# Patient Record
Sex: MALE | Race: WHITE | Employment: OTHER | URBAN - METROPOLITAN AREA
[De-identification: names, ages, dates, MRNs, and addresses within clinical notes are randomized per-mention and may not be internally consistent; named-entity substitution may affect disease eponyms.]

---

## 2018-04-07 ENCOUNTER — HOSPITAL ENCOUNTER (OUTPATIENT)
Age: 83
Discharge: HOME | End: 2018-04-07
Payer: MEDICARE

## 2018-04-07 LAB
INR BLD: 2.3
PROTHROMBIN TIME: 25.8 SEC (ref 9.3–12.4)

## 2018-04-07 PROCEDURE — 36415 COLL VENOUS BLD VENIPUNCTURE: CPT

## 2018-04-07 PROCEDURE — 85610 PROTHROMBIN TIME: CPT

## 2018-05-09 ENCOUNTER — HOSPITAL ENCOUNTER (OUTPATIENT)
Age: 83
Discharge: HOME | End: 2018-05-09
Payer: MEDICARE

## 2018-05-09 LAB
INR BLD: 2.9
PROTHROMBIN TIME: 33 SEC (ref 9.3–12.4)

## 2018-05-09 PROCEDURE — 85610 PROTHROMBIN TIME: CPT

## 2018-05-09 PROCEDURE — 36415 COLL VENOUS BLD VENIPUNCTURE: CPT

## 2018-06-04 ENCOUNTER — HOSPITAL ENCOUNTER (OUTPATIENT)
Age: 83
Discharge: HOME | End: 2018-06-04
Payer: MEDICARE

## 2018-06-04 LAB
INR BLD: 2.1
PROTHROMBIN TIME: 24 SEC (ref 9.3–12.4)

## 2018-06-04 PROCEDURE — 36415 COLL VENOUS BLD VENIPUNCTURE: CPT

## 2018-06-04 PROCEDURE — 85610 PROTHROMBIN TIME: CPT

## 2018-07-10 ENCOUNTER — HOSPITAL ENCOUNTER (OUTPATIENT)
Age: 83
Discharge: HOME | End: 2018-07-10
Payer: MEDICARE

## 2018-07-10 LAB
INR BLD: 1.5
PROTHROMBIN TIME: 16.6 SEC (ref 9.3–12.4)

## 2018-07-10 PROCEDURE — 36415 COLL VENOUS BLD VENIPUNCTURE: CPT

## 2018-07-10 PROCEDURE — 85610 PROTHROMBIN TIME: CPT

## 2018-08-02 ENCOUNTER — HOSPITAL ENCOUNTER (OUTPATIENT)
Age: 83
Discharge: HOME | End: 2018-08-02
Payer: MEDICARE

## 2018-08-02 LAB
ALBUMIN SERPL-MCNC: 3.6 G/DL (ref 3.5–5.2)
ALP BLD-CCNC: 77 U/L (ref 40–129)
ALT SERPL-CCNC: 8 U/L (ref 0–40)
ANION GAP SERPL CALCULATED.3IONS-SCNC: 11 MMOL/L (ref 7–16)
AST SERPL-CCNC: 14 U/L (ref 0–39)
BACTERIA: NORMAL /HPF
BASOPHILS ABSOLUTE: 0.02 E9/L (ref 0–0.2)
BASOPHILS RELATIVE PERCENT: 0.5 % (ref 0–2)
BILIRUB SERPL-MCNC: 0.3 MG/DL (ref 0–1.2)
BILIRUBIN URINE: NEGATIVE
BLOOD, URINE: NEGATIVE
BUN BLDV-MCNC: 46 MG/DL (ref 8–23)
CALCIUM SERPL-MCNC: 8.2 MG/DL (ref 8.6–10.2)
CHLORIDE BLD-SCNC: 108 MMOL/L (ref 98–107)
CLARITY: CLEAR
CO2: 19 MMOL/L (ref 22–29)
COLOR: YELLOW
CREAT SERPL-MCNC: 2.3 MG/DL (ref 0.7–1.2)
CREATININE URINE: 94 MG/DL (ref 40–278)
EOSINOPHILS ABSOLUTE: 0.16 E9/L (ref 0.05–0.5)
EOSINOPHILS RELATIVE PERCENT: 3.8 % (ref 0–6)
FERRITIN: 143 NG/ML
GFR AFRICAN AMERICAN: 32
GFR NON-AFRICAN AMERICAN: 27 ML/MIN/1.73
GLUCOSE BLD-MCNC: 128 MG/DL (ref 74–109)
GLUCOSE URINE: NEGATIVE MG/DL
HCT VFR BLD CALC: 32.3 % (ref 37–54)
HEMOGLOBIN: 10.2 G/DL (ref 12.5–16.5)
IMMATURE GRANULOCYTES #: 0.01 E9/L
IMMATURE GRANULOCYTES %: 0.2 % (ref 0–5)
INR BLD: 1.7
IRON SATURATION: 33 % (ref 20–55)
IRON: 66 MCG/DL (ref 59–158)
KETONES, URINE: NEGATIVE MG/DL
LEUKOCYTE ESTERASE, URINE: NEGATIVE
LYMPHOCYTES ABSOLUTE: 1.05 E9/L (ref 1.5–4)
LYMPHOCYTES RELATIVE PERCENT: 24.7 % (ref 20–42)
MAGNESIUM: 2 MG/DL (ref 1.6–2.6)
MCH RBC QN AUTO: 25.3 PG (ref 26–35)
MCHC RBC AUTO-ENTMCNC: 31.6 % (ref 32–34.5)
MCV RBC AUTO: 80.1 FL (ref 80–99.9)
MONOCYTES ABSOLUTE: 0.58 E9/L (ref 0.1–0.95)
MONOCYTES RELATIVE PERCENT: 13.6 % (ref 2–12)
NEUTROPHILS ABSOLUTE: 2.43 E9/L (ref 1.8–7.3)
NEUTROPHILS RELATIVE PERCENT: 57.2 % (ref 43–80)
NITRITE, URINE: NEGATIVE
PARATHYROID HORMONE INTACT: 83 PG/ML (ref 15–65)
PDW BLD-RTO: 14.8 FL (ref 11.5–15)
PH UA: 5 (ref 5–9)
PHOSPHORUS: 2.9 MG/DL (ref 2.5–4.5)
PLATELET # BLD: 144 E9/L (ref 130–450)
PMV BLD AUTO: 9.8 FL (ref 7–12)
POTASSIUM SERPL-SCNC: 4.9 MMOL/L (ref 3.5–5)
PROTEIN PROTEIN: 67 MG/DL (ref 0–12)
PROTEIN UA: 30 MG/DL
PROTEIN/CREAT RATIO: 0.7
PROTEIN/CREAT RATIO: 0.7 (ref 0–0.2)
PROTHROMBIN TIME: 19.7 SEC (ref 9.3–12.4)
RBC # BLD: 4.03 E12/L (ref 3.8–5.8)
RBC UA: NORMAL /HPF (ref 0–2)
SODIUM BLD-SCNC: 138 MMOL/L (ref 132–146)
SPECIFIC GRAVITY UA: 1.02 (ref 1–1.03)
TOTAL IRON BINDING CAPACITY: 202 MCG/DL (ref 250–450)
TOTAL PROTEIN: 6.8 G/DL (ref 6.4–8.3)
URIC ACID, SERUM: 8.6 MG/DL (ref 3.4–7)
UROBILINOGEN, URINE: 0.2 E.U./DL
VITAMIN D 25-HYDROXY: 44 NG/ML (ref 30–100)
WBC # BLD: 4.3 E9/L (ref 4.5–11.5)
WBC UA: NORMAL /HPF (ref 0–5)

## 2018-08-02 PROCEDURE — 82728 ASSAY OF FERRITIN: CPT

## 2018-08-02 PROCEDURE — 80053 COMPREHEN METABOLIC PANEL: CPT

## 2018-08-02 PROCEDURE — 84100 ASSAY OF PHOSPHORUS: CPT

## 2018-08-02 PROCEDURE — 82306 VITAMIN D 25 HYDROXY: CPT

## 2018-08-02 PROCEDURE — 85610 PROTHROMBIN TIME: CPT

## 2018-08-02 PROCEDURE — 81001 URINALYSIS AUTO W/SCOPE: CPT

## 2018-08-02 PROCEDURE — 85025 COMPLETE CBC W/AUTO DIFF WBC: CPT

## 2018-08-02 PROCEDURE — 83540 ASSAY OF IRON: CPT

## 2018-08-02 PROCEDURE — 83735 ASSAY OF MAGNESIUM: CPT

## 2018-08-02 PROCEDURE — 36415 COLL VENOUS BLD VENIPUNCTURE: CPT

## 2018-08-02 PROCEDURE — 83550 IRON BINDING TEST: CPT

## 2018-08-02 PROCEDURE — 84550 ASSAY OF BLOOD/URIC ACID: CPT

## 2018-08-02 PROCEDURE — 82570 ASSAY OF URINE CREATININE: CPT

## 2018-08-02 PROCEDURE — 84156 ASSAY OF PROTEIN URINE: CPT

## 2018-08-02 PROCEDURE — 83970 ASSAY OF PARATHORMONE: CPT

## 2018-08-14 ENCOUNTER — HOSPITAL ENCOUNTER (OUTPATIENT)
Age: 83
Discharge: HOME | End: 2018-08-16
Payer: MEDICARE

## 2018-08-14 PROCEDURE — 80061 LIPID PANEL: CPT

## 2018-08-14 PROCEDURE — 80053 COMPREHEN METABOLIC PANEL: CPT

## 2018-08-14 PROCEDURE — 84443 ASSAY THYROID STIM HORMONE: CPT

## 2018-08-14 PROCEDURE — 85025 COMPLETE CBC W/AUTO DIFF WBC: CPT

## 2018-08-14 PROCEDURE — 84480 ASSAY TRIIODOTHYRONINE (T3): CPT

## 2018-08-14 PROCEDURE — 84479 ASSAY OF THYROID (T3 OR T4): CPT

## 2018-08-14 PROCEDURE — 82306 VITAMIN D 25 HYDROXY: CPT

## 2018-08-14 PROCEDURE — 84436 ASSAY OF TOTAL THYROXINE: CPT

## 2018-08-15 LAB
ALBUMIN SERPL-MCNC: 3.9 G/DL (ref 3.5–5.2)
ALP BLD-CCNC: 87 U/L (ref 40–129)
ALT SERPL-CCNC: 21 U/L (ref 0–40)
ANION GAP SERPL CALCULATED.3IONS-SCNC: 19 MMOL/L (ref 7–16)
AST SERPL-CCNC: 21 U/L (ref 0–39)
BASOPHILS ABSOLUTE: 0.02 E9/L (ref 0–0.2)
BASOPHILS RELATIVE PERCENT: 0.5 % (ref 0–2)
BILIRUB SERPL-MCNC: 0.4 MG/DL (ref 0–1.2)
BUN BLDV-MCNC: 53 MG/DL (ref 8–23)
CALCIUM SERPL-MCNC: 8.4 MG/DL (ref 8.6–10.2)
CHLORIDE BLD-SCNC: 107 MMOL/L (ref 98–107)
CHOLESTEROL, TOTAL: 176 MG/DL (ref 0–199)
CO2: 17 MMOL/L (ref 22–29)
CREAT SERPL-MCNC: 2.5 MG/DL (ref 0.7–1.2)
EOSINOPHILS ABSOLUTE: 0.1 E9/L (ref 0.05–0.5)
EOSINOPHILS RELATIVE PERCENT: 2.4 % (ref 0–6)
GFR AFRICAN AMERICAN: 29
GFR NON-AFRICAN AMERICAN: 24 ML/MIN/1.73
GLUCOSE BLD-MCNC: 84 MG/DL (ref 74–109)
HCT VFR BLD CALC: 32.7 % (ref 37–54)
HDLC SERPL-MCNC: 61 MG/DL
HEMOGLOBIN: 9.8 G/DL (ref 12.5–16.5)
IMMATURE GRANULOCYTES #: 0.02 E9/L
IMMATURE GRANULOCYTES %: 0.5 % (ref 0–5)
LDL CHOLESTEROL CALCULATED: 96 MG/DL (ref 0–99)
LYMPHOCYTES ABSOLUTE: 1.07 E9/L (ref 1.5–4)
LYMPHOCYTES RELATIVE PERCENT: 25.2 % (ref 20–42)
MCH RBC QN AUTO: 24.9 PG (ref 26–35)
MCHC RBC AUTO-ENTMCNC: 30 % (ref 32–34.5)
MCV RBC AUTO: 83 FL (ref 80–99.9)
MONOCYTES ABSOLUTE: 0.57 E9/L (ref 0.1–0.95)
MONOCYTES RELATIVE PERCENT: 13.4 % (ref 2–12)
NEUTROPHILS ABSOLUTE: 2.46 E9/L (ref 1.8–7.3)
NEUTROPHILS RELATIVE PERCENT: 58 % (ref 43–80)
PDW BLD-RTO: 15.4 FL (ref 11.5–15)
PLATELET # BLD: 163 E9/L (ref 130–450)
PMV BLD AUTO: 10.9 FL (ref 7–12)
POTASSIUM SERPL-SCNC: 6.2 MMOL/L (ref 3.5–5)
RBC # BLD: 3.94 E12/L (ref 3.8–5.8)
SODIUM BLD-SCNC: 143 MMOL/L (ref 132–146)
T3 TOTAL: 73.83 NG/DL (ref 80–200)
T3 UPTAKE PERCENT: 34.2 % (ref 22.5–37)
T4 TOTAL: 6.5 MCG/DL (ref 4.5–11.7)
TOTAL PROTEIN: 7.1 G/DL (ref 6.4–8.3)
TRIGL SERPL-MCNC: 96 MG/DL (ref 0–149)
TSH SERPL DL<=0.05 MIU/L-ACNC: 3.93 UIU/ML (ref 0.27–4.2)
VITAMIN D 25-HYDROXY: 51 NG/ML (ref 30–100)
VLDLC SERPL CALC-MCNC: 19 MG/DL
WBC # BLD: 4.2 E9/L (ref 4.5–11.5)

## 2018-08-17 ENCOUNTER — HOSPITAL ENCOUNTER (OUTPATIENT)
Age: 83
Discharge: HOME | End: 2018-08-17
Payer: MEDICARE

## 2018-08-17 LAB
ANION GAP SERPL CALCULATED.3IONS-SCNC: 12 MMOL/L (ref 7–16)
BUN BLDV-MCNC: 47 MG/DL (ref 8–23)
CALCIUM SERPL-MCNC: 8.4 MG/DL (ref 8.6–10.2)
CHLORIDE BLD-SCNC: 107 MMOL/L (ref 98–107)
CO2: 21 MMOL/L (ref 22–29)
CREAT SERPL-MCNC: 2.4 MG/DL (ref 0.7–1.2)
GFR AFRICAN AMERICAN: 31
GFR NON-AFRICAN AMERICAN: 25 ML/MIN/1.73
GLUCOSE BLD-MCNC: 98 MG/DL (ref 74–109)
POTASSIUM SERPL-SCNC: 5.4 MMOL/L (ref 3.5–5)
SODIUM BLD-SCNC: 140 MMOL/L (ref 132–146)

## 2018-08-17 PROCEDURE — 36415 COLL VENOUS BLD VENIPUNCTURE: CPT

## 2018-08-17 PROCEDURE — 80048 BASIC METABOLIC PNL TOTAL CA: CPT

## 2018-09-15 ENCOUNTER — HOSPITAL ENCOUNTER (OUTPATIENT)
Age: 83
Discharge: HOME | End: 2018-09-15
Payer: MEDICARE

## 2018-09-15 LAB
INR BLD: 2.1
PROTHROMBIN TIME: 23.9 SEC (ref 9.3–12.4)

## 2018-09-15 PROCEDURE — 36415 COLL VENOUS BLD VENIPUNCTURE: CPT

## 2018-09-15 PROCEDURE — 85610 PROTHROMBIN TIME: CPT

## 2018-10-18 ENCOUNTER — HOSPITAL ENCOUNTER (OUTPATIENT)
Age: 83
Discharge: HOME | End: 2018-10-18
Payer: MEDICARE

## 2018-10-18 LAB
INR BLD: 2.7
PROTHROMBIN TIME: 30.9 SEC (ref 9.3–12.4)

## 2018-10-18 PROCEDURE — 85610 PROTHROMBIN TIME: CPT

## 2018-10-18 PROCEDURE — 36415 COLL VENOUS BLD VENIPUNCTURE: CPT

## 2018-11-14 ENCOUNTER — HOSPITAL ENCOUNTER (OUTPATIENT)
Age: 83
Discharge: HOME | End: 2018-11-14
Payer: MEDICARE

## 2018-11-14 LAB
INR BLD: 2.1
PROTHROMBIN TIME: 23.5 SEC (ref 9.3–12.4)

## 2018-11-14 PROCEDURE — 85610 PROTHROMBIN TIME: CPT

## 2018-11-14 PROCEDURE — 36415 COLL VENOUS BLD VENIPUNCTURE: CPT

## 2018-12-12 ENCOUNTER — HOSPITAL ENCOUNTER (OUTPATIENT)
Age: 83
Discharge: HOME | End: 2018-12-12
Payer: MEDICARE

## 2018-12-12 LAB
INR BLD: 2
PROTHROMBIN TIME: 22.9 SEC (ref 9.3–12.4)

## 2018-12-12 PROCEDURE — 85610 PROTHROMBIN TIME: CPT

## 2018-12-12 PROCEDURE — 36415 COLL VENOUS BLD VENIPUNCTURE: CPT

## 2019-01-01 ENCOUNTER — HOSPITAL ENCOUNTER (OUTPATIENT)
Age: 84
Discharge: HOME | End: 2019-11-16
Payer: MEDICARE

## 2019-01-01 ENCOUNTER — HOSPITAL ENCOUNTER (OUTPATIENT)
Age: 84
Discharge: HOME | End: 2019-07-27
Payer: MEDICARE

## 2019-01-01 ENCOUNTER — HOSPITAL ENCOUNTER (INPATIENT)
Age: 84
LOS: 2 days | Discharge: HOME | DRG: 149 | End: 2019-08-05
Attending: EMERGENCY MEDICINE | Admitting: INTERNAL MEDICINE
Payer: MEDICARE

## 2019-01-01 ENCOUNTER — HOSPITAL ENCOUNTER (OUTPATIENT)
Age: 84
Discharge: HOME | End: 2019-06-25
Payer: MEDICARE

## 2019-01-01 ENCOUNTER — NURSE ONLY (OUTPATIENT)
Dept: NON INVASIVE DIAGNOSTICS | Age: 84
End: 2019-01-01

## 2019-01-01 ENCOUNTER — HOSPITAL ENCOUNTER (OUTPATIENT)
Age: 84
Discharge: HOME | End: 2019-09-24
Payer: MEDICARE

## 2019-01-01 ENCOUNTER — HOSPITAL ENCOUNTER (OUTPATIENT)
Age: 84
Discharge: HOME | End: 2019-12-14
Payer: MEDICARE

## 2019-01-01 ENCOUNTER — HOSPITAL ENCOUNTER (OUTPATIENT)
Age: 84
Discharge: HOME | End: 2019-08-17
Payer: MEDICARE

## 2019-01-01 ENCOUNTER — HOSPITAL ENCOUNTER (OUTPATIENT)
Age: 84
Discharge: HOME | End: 2019-05-11
Payer: MEDICARE

## 2019-01-01 ENCOUNTER — HOSPITAL ENCOUNTER (OUTPATIENT)
Age: 84
Discharge: HOME | End: 2019-06-19
Payer: MEDICARE

## 2019-01-01 ENCOUNTER — HOSPITAL ENCOUNTER (OUTPATIENT)
Age: 84
Discharge: HOME | End: 2019-10-10
Payer: MEDICARE

## 2019-01-01 ENCOUNTER — TELEPHONE (OUTPATIENT)
Dept: NON INVASIVE DIAGNOSTICS | Age: 84
End: 2019-01-01

## 2019-01-01 ENCOUNTER — HOSPITAL ENCOUNTER (OUTPATIENT)
Age: 84
Discharge: HOME | End: 2019-08-22
Payer: MEDICARE

## 2019-01-01 ENCOUNTER — HOSPITAL ENCOUNTER (OUTPATIENT)
Age: 84
Discharge: HOME | End: 2019-08-15
Payer: MEDICARE

## 2019-01-01 ENCOUNTER — TELEPHONE (OUTPATIENT)
Dept: ADMINISTRATIVE | Age: 84
End: 2019-01-01

## 2019-01-01 ENCOUNTER — HOSPITAL ENCOUNTER (OUTPATIENT)
Age: 84
Discharge: HOME | End: 2019-04-24
Payer: MEDICARE

## 2019-01-01 ENCOUNTER — HOSPITAL ENCOUNTER (OUTPATIENT)
Age: 84
Discharge: HOME | End: 2019-06-26
Payer: MEDICARE

## 2019-01-01 VITALS
BODY MASS INDEX: 22.76 KG/M2 | HEART RATE: 65 BPM | WEIGHT: 145 LBS | RESPIRATION RATE: 16 BRPM | TEMPERATURE: 98.9 F | SYSTOLIC BLOOD PRESSURE: 184 MMHG | HEIGHT: 67 IN | OXYGEN SATURATION: 96 % | DIASTOLIC BLOOD PRESSURE: 68 MMHG

## 2019-01-01 DIAGNOSIS — E87.5 HYPERKALEMIA: ICD-10-CM

## 2019-01-01 DIAGNOSIS — Z79.01 ANTICOAGULANT LONG-TERM USE: ICD-10-CM

## 2019-01-01 DIAGNOSIS — R00.1 BRADYCARDIA: ICD-10-CM

## 2019-01-01 DIAGNOSIS — R00.1 BRADYCARDIA: Primary | ICD-10-CM

## 2019-01-01 DIAGNOSIS — I48.20 CHRONIC ATRIAL FIBRILLATION (HCC): ICD-10-CM

## 2019-01-01 DIAGNOSIS — I25.10 CORONARY ARTERY DISEASE INVOLVING NATIVE CORONARY ARTERY OF NATIVE HEART WITHOUT ANGINA PECTORIS: ICD-10-CM

## 2019-01-01 DIAGNOSIS — I48.92 ATRIAL FLUTTER, UNSPECIFIED TYPE (HCC): ICD-10-CM

## 2019-01-01 DIAGNOSIS — R42 DIZZINESS: ICD-10-CM

## 2019-01-01 LAB
ABO/RH: NORMAL
ALBUMIN SERPL-MCNC: 3.6 G/DL (ref 3.5–5.2)
ALBUMIN SERPL-MCNC: 4 G/DL (ref 3.5–5.2)
ALBUMIN SERPL-MCNC: 4.1 G/DL (ref 3.5–5.2)
ALP BLD-CCNC: 70 U/L (ref 40–129)
ALP BLD-CCNC: 72 U/L (ref 40–129)
ALP BLD-CCNC: 72 U/L (ref 40–129)
ALT SERPL-CCNC: 12 U/L (ref 0–40)
ALT SERPL-CCNC: 12 U/L (ref 0–40)
ALT SERPL-CCNC: 15 U/L (ref 0–40)
ANION GAP SERPL CALCULATED.3IONS-SCNC: 11 MMOL/L (ref 7–16)
ANION GAP SERPL CALCULATED.3IONS-SCNC: 12 MMOL/L (ref 7–16)
ANION GAP SERPL CALCULATED.3IONS-SCNC: 12 MMOL/L (ref 7–16)
ANTIBODY SCREEN: NORMAL
APTT: 38.9 SEC (ref 24.5–35.1)
AST SERPL-CCNC: 13 U/L (ref 0–39)
AST SERPL-CCNC: 17 U/L (ref 0–39)
AST SERPL-CCNC: 18 U/L (ref 0–39)
BASOPHILS ABSOLUTE: 0.02 E9/L (ref 0–0.2)
BASOPHILS RELATIVE PERCENT: 0.4 % (ref 0–2)
BASOPHILS RELATIVE PERCENT: 0.5 % (ref 0–2)
BASOPHILS RELATIVE PERCENT: 0.5 % (ref 0–2)
BILIRUB SERPL-MCNC: 0.3 MG/DL (ref 0–1.2)
BILIRUB SERPL-MCNC: 0.4 MG/DL (ref 0–1.2)
BILIRUB SERPL-MCNC: 0.5 MG/DL (ref 0–1.2)
BUN BLDV-MCNC: 41 MG/DL (ref 8–23)
BUN BLDV-MCNC: 46 MG/DL (ref 8–23)
BUN BLDV-MCNC: 51 MG/DL (ref 8–23)
BUN BLDV-MCNC: 54 MG/DL (ref 8–23)
BUN BLDV-MCNC: 55 MG/DL (ref 8–23)
CALCIUM SERPL-MCNC: 8.4 MG/DL (ref 8.6–10.2)
CALCIUM SERPL-MCNC: 8.4 MG/DL (ref 8.6–10.2)
CALCIUM SERPL-MCNC: 8.5 MG/DL (ref 8.6–10.2)
CALCIUM SERPL-MCNC: 8.8 MG/DL (ref 8.6–10.2)
CALCIUM SERPL-MCNC: 9.1 MG/DL (ref 8.6–10.2)
CHLORIDE BLD-SCNC: 103 MMOL/L (ref 98–107)
CHLORIDE BLD-SCNC: 104 MMOL/L (ref 98–107)
CHLORIDE BLD-SCNC: 105 MMOL/L (ref 98–107)
CHLORIDE BLD-SCNC: 107 MMOL/L (ref 98–107)
CHLORIDE BLD-SCNC: 107 MMOL/L (ref 98–107)
CHOLESTEROL, TOTAL: 180 MG/DL (ref 0–199)
CO2: 22 MMOL/L (ref 22–29)
CO2: 23 MMOL/L (ref 22–29)
CO2: 23 MMOL/L (ref 22–29)
CO2: 24 MMOL/L (ref 22–29)
CO2: 24 MMOL/L (ref 22–29)
CREAT SERPL-MCNC: 2.6 MG/DL (ref 0.7–1.2)
CREAT SERPL-MCNC: 2.8 MG/DL (ref 0.7–1.2)
CREAT SERPL-MCNC: 2.8 MG/DL (ref 0.7–1.2)
CREAT SERPL-MCNC: 2.9 MG/DL (ref 0.7–1.2)
CREAT SERPL-MCNC: 2.9 MG/DL (ref 0.7–1.2)
EKG ATRIAL RATE: 44 BPM
EKG Q-T INTERVAL: 522 MS
EKG QRS DURATION: 148 MS
EKG QTC CALCULATION (BAZETT): 451 MS
EKG R AXIS: -40 DEGREES
EKG T AXIS: 36 DEGREES
EKG VENTRICULAR RATE: 45 BPM
EOSINOPHILS ABSOLUTE: 0.08 E9/L (ref 0.05–0.5)
EOSINOPHILS ABSOLUTE: 0.09 E9/L (ref 0.05–0.5)
EOSINOPHILS ABSOLUTE: 0.1 E9/L (ref 0.05–0.5)
EOSINOPHILS RELATIVE PERCENT: 2 % (ref 0–6)
EOSINOPHILS RELATIVE PERCENT: 2 % (ref 0–6)
EOSINOPHILS RELATIVE PERCENT: 2.4 % (ref 0–6)
FERRITIN: 111 NG/ML
GFR AFRICAN AMERICAN: 25
GFR AFRICAN AMERICAN: 25
GFR AFRICAN AMERICAN: 26
GFR AFRICAN AMERICAN: 26
GFR AFRICAN AMERICAN: 28
GFR AFRICAN AMERICAN: 28
GFR NON-AFRICAN AMERICAN: 20 ML/MIN/1.73
GFR NON-AFRICAN AMERICAN: 20 ML/MIN/1.73
GFR NON-AFRICAN AMERICAN: 21 ML/MIN/1.73
GFR NON-AFRICAN AMERICAN: 21 ML/MIN/1.73
GFR NON-AFRICAN AMERICAN: 23 ML/MIN/1.73
GFR NON-AFRICAN AMERICAN: 23 ML/MIN/1.73
GLUCOSE BLD-MCNC: 102 MG/DL (ref 74–99)
GLUCOSE BLD-MCNC: 83 MG/DL (ref 74–99)
GLUCOSE BLD-MCNC: 84 MG/DL (ref 74–99)
GLUCOSE BLD-MCNC: 86 MG/DL (ref 74–99)
GLUCOSE BLD-MCNC: 89 MG/DL (ref 74–99)
GLUCOSE BLD-MCNC: 91 MG/DL (ref 74–99)
HCT VFR BLD CALC: 31.7 % (ref 37–54)
HCT VFR BLD CALC: 32.9 % (ref 37–54)
HCT VFR BLD CALC: 33.5 % (ref 37–54)
HCT VFR BLD CALC: 36.3 % (ref 37–54)
HDLC SERPL-MCNC: 67 MG/DL
HEMOGLOBIN: 10.4 G/DL (ref 12.5–16.5)
HEMOGLOBIN: 10.5 G/DL (ref 12.5–16.5)
HEMOGLOBIN: 11.1 G/DL (ref 12.5–16.5)
HEMOGLOBIN: 9.7 G/DL (ref 12.5–16.5)
IMMATURE GRANULOCYTES #: 0.01 E9/L
IMMATURE GRANULOCYTES #: 0.02 E9/L
IMMATURE GRANULOCYTES #: 0.03 E9/L
IMMATURE GRANULOCYTES %: 0.2 % (ref 0–5)
IMMATURE GRANULOCYTES %: 0.4 % (ref 0–5)
IMMATURE GRANULOCYTES %: 0.7 % (ref 0–5)
INR BLD: 1.5
INR BLD: 1.6
INR BLD: 1.7
INR BLD: 1.8
INR BLD: 1.8
INR BLD: 1.9
INR BLD: 1.9
INR BLD: 2
INR BLD: 2
INR BLD: 2.1
INR BLD: 2.1
INR BLD: 2.4
INR BLD: 2.5
IRON SATURATION: 30 % (ref 20–55)
IRON: 69 MCG/DL (ref 59–158)
LDL CHOLESTEROL CALCULATED: 95 MG/DL (ref 0–99)
LV EF: 70 %
LVEF MODALITY: NORMAL
LYMPHOCYTES ABSOLUTE: 1.02 E9/L (ref 1.5–4)
LYMPHOCYTES ABSOLUTE: 1.06 E9/L (ref 1.5–4)
LYMPHOCYTES ABSOLUTE: 1.12 E9/L (ref 1.5–4)
LYMPHOCYTES RELATIVE PERCENT: 22.4 % (ref 20–42)
LYMPHOCYTES RELATIVE PERCENT: 26 % (ref 20–42)
LYMPHOCYTES RELATIVE PERCENT: 26.4 % (ref 20–42)
MCH RBC QN AUTO: 25.2 PG (ref 26–35)
MCH RBC QN AUTO: 25.6 PG (ref 26–35)
MCH RBC QN AUTO: 25.7 PG (ref 26–35)
MCH RBC QN AUTO: 25.7 PG (ref 26–35)
MCHC RBC AUTO-ENTMCNC: 30.6 % (ref 32–34.5)
MCHC RBC AUTO-ENTMCNC: 30.6 % (ref 32–34.5)
MCHC RBC AUTO-ENTMCNC: 31.3 % (ref 32–34.5)
MCHC RBC AUTO-ENTMCNC: 31.6 % (ref 32–34.5)
MCV RBC AUTO: 81 FL (ref 80–99.9)
MCV RBC AUTO: 81.9 FL (ref 80–99.9)
MCV RBC AUTO: 82.5 FL (ref 80–99.9)
MCV RBC AUTO: 83.9 FL (ref 80–99.9)
MONOCYTES ABSOLUTE: 0.53 E9/L (ref 0.1–0.95)
MONOCYTES ABSOLUTE: 0.58 E9/L (ref 0.1–0.95)
MONOCYTES ABSOLUTE: 0.61 E9/L (ref 0.1–0.95)
MONOCYTES RELATIVE PERCENT: 12.5 % (ref 2–12)
MONOCYTES RELATIVE PERCENT: 12.7 % (ref 2–12)
MONOCYTES RELATIVE PERCENT: 15 % (ref 2–12)
NEUTROPHILS ABSOLUTE: 2.3 E9/L (ref 1.8–7.3)
NEUTROPHILS ABSOLUTE: 2.45 E9/L (ref 1.8–7.3)
NEUTROPHILS ABSOLUTE: 2.82 E9/L (ref 1.8–7.3)
NEUTROPHILS RELATIVE PERCENT: 56.3 % (ref 43–80)
NEUTROPHILS RELATIVE PERCENT: 57.5 % (ref 43–80)
NEUTROPHILS RELATIVE PERCENT: 62.1 % (ref 43–80)
PDW BLD-RTO: 14.3 FL (ref 11.5–15)
PDW BLD-RTO: 14.4 FL (ref 11.5–15)
PDW BLD-RTO: 14.5 FL (ref 11.5–15)
PDW BLD-RTO: 14.6 FL (ref 11.5–15)
PERFORMED ON: ABNORMAL
PLATELET # BLD: 135 E9/L (ref 130–450)
PLATELET # BLD: 150 E9/L (ref 130–450)
PLATELET # BLD: 151 E9/L (ref 130–450)
PLATELET # BLD: 158 E9/L (ref 130–450)
PMV BLD AUTO: 10.2 FL (ref 7–12)
PMV BLD AUTO: 10.3 FL (ref 7–12)
PMV BLD AUTO: 10.3 FL (ref 7–12)
PMV BLD AUTO: 10.5 FL (ref 7–12)
POC CHLORIDE: 112 MMOL/L (ref 100–108)
POC CREATININE: 2.6 MG/DL (ref 0.7–1.2)
POC POTASSIUM: 5.5 MMOL/L (ref 3.5–5)
POC SODIUM: 142 MMOL/L (ref 132–146)
POTASSIUM REFLEX MAGNESIUM: 4.9 MMOL/L (ref 3.5–5)
POTASSIUM REFLEX MAGNESIUM: 5.5 MMOL/L (ref 3.5–5)
POTASSIUM SERPL-SCNC: 4.6 MMOL/L (ref 3.5–5)
POTASSIUM SERPL-SCNC: 5.4 MMOL/L (ref 3.5–5)
POTASSIUM SERPL-SCNC: 5.7 MMOL/L (ref 3.5–5)
POTASSIUM SERPL-SCNC: 6 MMOL/L (ref 3.5–5)
PROTHROMBIN TIME: 16.7 SEC (ref 9.3–12.4)
PROTHROMBIN TIME: 18.7 SEC (ref 9.3–12.4)
PROTHROMBIN TIME: 18.8 SEC (ref 9.3–12.4)
PROTHROMBIN TIME: 20.3 SEC (ref 9.3–12.4)
PROTHROMBIN TIME: 20.9 SEC (ref 9.3–12.4)
PROTHROMBIN TIME: 21.9 SEC (ref 9.3–12.4)
PROTHROMBIN TIME: 22.1 SEC (ref 9.3–12.4)
PROTHROMBIN TIME: 22.4 SEC (ref 9.3–12.4)
PROTHROMBIN TIME: 22.8 SEC (ref 9.3–12.4)
PROTHROMBIN TIME: 23.5 SEC (ref 9.3–12.4)
PROTHROMBIN TIME: 24.2 SEC (ref 9.3–12.4)
PROTHROMBIN TIME: 26.6 SEC (ref 9.3–12.4)
PROTHROMBIN TIME: 28.3 SEC (ref 9.3–12.4)
RBC # BLD: 3.78 E12/L (ref 3.8–5.8)
RBC # BLD: 4.06 E12/L (ref 3.8–5.8)
RBC # BLD: 4.09 E12/L (ref 3.8–5.8)
RBC # BLD: 4.4 E12/L (ref 3.8–5.8)
SODIUM BLD-SCNC: 138 MMOL/L (ref 132–146)
SODIUM BLD-SCNC: 139 MMOL/L (ref 132–146)
SODIUM BLD-SCNC: 140 MMOL/L (ref 132–146)
SODIUM BLD-SCNC: 141 MMOL/L (ref 132–146)
SODIUM BLD-SCNC: 141 MMOL/L (ref 132–146)
TOTAL IRON BINDING CAPACITY: 230 MCG/DL (ref 250–450)
TOTAL PROTEIN: 6.6 G/DL (ref 6.4–8.3)
TOTAL PROTEIN: 7 G/DL (ref 6.4–8.3)
TOTAL PROTEIN: 7.4 G/DL (ref 6.4–8.3)
TRIGL SERPL-MCNC: 91 MG/DL (ref 0–149)
TROPONIN: 0.05 NG/ML (ref 0–0.03)
VITAMIN D 25-HYDROXY: 44 NG/ML (ref 30–100)
VLDLC SERPL CALC-MCNC: 18 MG/DL
WBC # BLD: 4.1 E9/L (ref 4.5–11.5)
WBC # BLD: 4.3 E9/L (ref 4.5–11.5)
WBC # BLD: 4.6 E9/L (ref 4.5–11.5)
WBC # BLD: 4.8 E9/L (ref 4.5–11.5)

## 2019-01-01 PROCEDURE — 85610 PROTHROMBIN TIME: CPT

## 2019-01-01 PROCEDURE — 80061 LIPID PANEL: CPT

## 2019-01-01 PROCEDURE — 2060000000 HC ICU INTERMEDIATE R&B

## 2019-01-01 PROCEDURE — 80053 COMPREHEN METABOLIC PANEL: CPT

## 2019-01-01 PROCEDURE — 83540 ASSAY OF IRON: CPT

## 2019-01-01 PROCEDURE — 93306 TTE W/DOPPLER COMPLETE: CPT

## 2019-01-01 PROCEDURE — 36415 COLL VENOUS BLD VENIPUNCTURE: CPT

## 2019-01-01 PROCEDURE — 80048 BASIC METABOLIC PNL TOTAL CA: CPT

## 2019-01-01 PROCEDURE — 84132 ASSAY OF SERUM POTASSIUM: CPT

## 2019-01-01 PROCEDURE — 93005 ELECTROCARDIOGRAM TRACING: CPT | Performed by: STUDENT IN AN ORGANIZED HEALTH CARE EDUCATION/TRAINING PROGRAM

## 2019-01-01 PROCEDURE — 82728 ASSAY OF FERRITIN: CPT

## 2019-01-01 PROCEDURE — 86901 BLOOD TYPING SEROLOGIC RH(D): CPT

## 2019-01-01 PROCEDURE — 86900 BLOOD TYPING SEROLOGIC ABO: CPT

## 2019-01-01 PROCEDURE — 99223 1ST HOSP IP/OBS HIGH 75: CPT | Performed by: INTERNAL MEDICINE

## 2019-01-01 PROCEDURE — 85025 COMPLETE CBC W/AUTO DIFF WBC: CPT

## 2019-01-01 PROCEDURE — G0378 HOSPITAL OBSERVATION PER HR: HCPCS

## 2019-01-01 PROCEDURE — 85027 COMPLETE CBC AUTOMATED: CPT

## 2019-01-01 PROCEDURE — 84295 ASSAY OF SERUM SODIUM: CPT

## 2019-01-01 PROCEDURE — 86850 RBC ANTIBODY SCREEN: CPT

## 2019-01-01 PROCEDURE — 85730 THROMBOPLASTIN TIME PARTIAL: CPT

## 2019-01-01 PROCEDURE — 83550 IRON BINDING TEST: CPT

## 2019-01-01 PROCEDURE — 6370000000 HC RX 637 (ALT 250 FOR IP): Performed by: INTERNAL MEDICINE

## 2019-01-01 PROCEDURE — 84484 ASSAY OF TROPONIN QUANT: CPT

## 2019-01-01 PROCEDURE — 82565 ASSAY OF CREATININE: CPT

## 2019-01-01 PROCEDURE — 82947 ASSAY GLUCOSE BLOOD QUANT: CPT

## 2019-01-01 PROCEDURE — 99285 EMERGENCY DEPT VISIT HI MDM: CPT

## 2019-01-01 PROCEDURE — 82306 VITAMIN D 25 HYDROXY: CPT

## 2019-01-01 PROCEDURE — 82435 ASSAY OF BLOOD CHLORIDE: CPT

## 2019-01-01 PROCEDURE — 93010 ELECTROCARDIOGRAM REPORT: CPT | Performed by: INTERNAL MEDICINE

## 2019-01-01 RX ORDER — VITS A,C,E/LUTEIN/MINERALS 300MCG-200
1 TABLET ORAL 2 TIMES DAILY
Status: DISCONTINUED | OUTPATIENT
Start: 2019-01-01 | End: 2019-01-01 | Stop reason: HOSPADM

## 2019-01-01 RX ORDER — ISOSORBIDE MONONITRATE 60 MG/1
60 TABLET, EXTENDED RELEASE ORAL DAILY
Status: DISCONTINUED | OUTPATIENT
Start: 2019-01-01 | End: 2019-01-01 | Stop reason: HOSPADM

## 2019-01-01 RX ORDER — RAMIPRIL 2.5 MG/1
2.5 CAPSULE ORAL DAILY
Status: DISCONTINUED | OUTPATIENT
Start: 2019-01-01 | End: 2019-01-01 | Stop reason: HOSPADM

## 2019-01-01 RX ORDER — TAMSULOSIN HYDROCHLORIDE 0.4 MG/1
0.4 CAPSULE ORAL DAILY
Status: DISCONTINUED | OUTPATIENT
Start: 2019-01-01 | End: 2019-01-01 | Stop reason: HOSPADM

## 2019-01-01 RX ORDER — SODIUM BICARBONATE 650 MG/1
650 TABLET ORAL 2 TIMES DAILY
Status: DISCONTINUED | OUTPATIENT
Start: 2019-01-01 | End: 2019-01-01 | Stop reason: HOSPADM

## 2019-01-01 RX ORDER — WARFARIN SODIUM 5 MG/1
5 TABLET ORAL DAILY
Status: DISCONTINUED | OUTPATIENT
Start: 2019-01-01 | End: 2019-01-01 | Stop reason: HOSPADM

## 2019-01-01 RX ORDER — HYDRALAZINE HYDROCHLORIDE 50 MG/1
50 TABLET, FILM COATED ORAL 2 TIMES DAILY
Status: DISCONTINUED | OUTPATIENT
Start: 2019-01-01 | End: 2019-01-01 | Stop reason: HOSPADM

## 2019-01-01 RX ADMIN — ISOSORBIDE MONONITRATE 60 MG: 60 TABLET ORAL at 10:14

## 2019-01-01 RX ADMIN — VITAMIN D, TAB 1000IU (100/BT) 1000 UNITS: 25 TAB at 09:08

## 2019-01-01 RX ADMIN — CYCLOPENTOLATE HYDROCHLORIDE 1 TABLET: 10 SOLUTION/ DROPS OPHTHALMIC at 20:07

## 2019-01-01 RX ADMIN — CYCLOPENTOLATE HYDROCHLORIDE 1 TABLET: 10 SOLUTION/ DROPS OPHTHALMIC at 09:08

## 2019-01-01 RX ADMIN — TAMSULOSIN HYDROCHLORIDE 0.4 MG: 0.4 CAPSULE ORAL at 10:15

## 2019-01-01 RX ADMIN — SODIUM BICARBONATE 650 MG: 650 TABLET ORAL at 20:08

## 2019-01-01 RX ADMIN — VITAMIN D, TAB 1000IU (100/BT) 1000 UNITS: 25 TAB at 14:06

## 2019-01-01 RX ADMIN — VITAMIN D, TAB 1000IU (100/BT) 1000 UNITS: 25 TAB at 20:08

## 2019-01-01 RX ADMIN — SODIUM BICARBONATE 650 MG: 650 TABLET ORAL at 11:46

## 2019-01-01 RX ADMIN — HYDRALAZINE HYDROCHLORIDE 50 MG: 50 TABLET, FILM COATED ORAL at 10:19

## 2019-01-01 RX ADMIN — CYCLOPENTOLATE HYDROCHLORIDE 1 TABLET: 10 SOLUTION/ DROPS OPHTHALMIC at 10:14

## 2019-01-01 RX ADMIN — TAMSULOSIN HYDROCHLORIDE 0.4 MG: 0.4 CAPSULE ORAL at 09:08

## 2019-01-01 RX ADMIN — RAMIPRIL 2.5 MG: 2.5 CAPSULE ORAL at 11:46

## 2019-01-01 RX ADMIN — VITAMIN D, TAB 1000IU (100/BT) 1000 UNITS: 25 TAB at 20:51

## 2019-01-01 RX ADMIN — HYDRALAZINE HYDROCHLORIDE 50 MG: 50 TABLET, FILM COATED ORAL at 20:08

## 2019-01-01 RX ADMIN — CYCLOPENTOLATE HYDROCHLORIDE 1 TABLET: 10 SOLUTION/ DROPS OPHTHALMIC at 20:51

## 2019-01-01 RX ADMIN — HYDRALAZINE HYDROCHLORIDE 50 MG: 50 TABLET, FILM COATED ORAL at 20:52

## 2019-01-01 RX ADMIN — HYDRALAZINE HYDROCHLORIDE 50 MG: 50 TABLET, FILM COATED ORAL at 09:09

## 2019-01-01 RX ADMIN — SODIUM BICARBONATE 650 MG: 650 TABLET ORAL at 10:14

## 2019-01-01 RX ADMIN — Medication 1 TABLET: at 10:14

## 2019-01-01 RX ADMIN — Medication 1 TABLET: at 09:08

## 2019-01-01 RX ADMIN — Medication 1 TABLET: at 14:06

## 2019-01-01 RX ADMIN — TAMSULOSIN HYDROCHLORIDE 0.4 MG: 0.4 CAPSULE ORAL at 11:46

## 2019-01-01 RX ADMIN — ISOSORBIDE MONONITRATE 60 MG: 60 TABLET ORAL at 14:06

## 2019-01-01 RX ADMIN — SODIUM BICARBONATE 650 MG: 650 TABLET ORAL at 20:52

## 2019-01-01 RX ADMIN — HYDRALAZINE HYDROCHLORIDE 50 MG: 50 TABLET, FILM COATED ORAL at 11:47

## 2019-01-01 RX ADMIN — WARFARIN SODIUM 5 MG: 5 TABLET ORAL at 17:16

## 2019-01-01 RX ADMIN — ISOSORBIDE MONONITRATE 60 MG: 60 TABLET ORAL at 09:08

## 2019-01-01 RX ADMIN — RAMIPRIL 2.5 MG: 2.5 CAPSULE ORAL at 10:14

## 2019-01-01 RX ADMIN — WARFARIN SODIUM 5 MG: 5 TABLET ORAL at 17:10

## 2019-01-01 RX ADMIN — CYCLOPENTOLATE HYDROCHLORIDE 1 TABLET: 10 SOLUTION/ DROPS OPHTHALMIC at 11:46

## 2019-01-01 RX ADMIN — VITAMIN D, TAB 1000IU (100/BT) 1000 UNITS: 25 TAB at 10:15

## 2019-01-01 RX ADMIN — SODIUM BICARBONATE 650 MG: 650 TABLET ORAL at 09:09

## 2019-01-01 RX ADMIN — RAMIPRIL 2.5 MG: 2.5 CAPSULE ORAL at 09:08

## 2019-01-01 ASSESSMENT — PAIN SCALES - GENERAL
PAINLEVEL_OUTOF10: 0

## 2019-01-01 ASSESSMENT — ENCOUNTER SYMPTOMS
COUGH: 0
BACK PAIN: 0
SHORTNESS OF BREATH: 0
EYE REDNESS: 0
NAUSEA: 0
DIARRHEA: 0
EYE PAIN: 0
VOMITING: 0
EYE DISCHARGE: 0
SINUS PRESSURE: 0
WHEEZING: 0
ABDOMINAL PAIN: 0
SORE THROAT: 0

## 2019-01-12 ENCOUNTER — HOSPITAL ENCOUNTER (OUTPATIENT)
Age: 84
Discharge: HOME | End: 2019-01-12
Payer: MEDICARE

## 2019-01-12 LAB
INR BLD: 2.4
PROTHROMBIN TIME: 26.8 SEC (ref 9.3–12.4)

## 2019-01-12 PROCEDURE — 85610 PROTHROMBIN TIME: CPT

## 2019-01-12 PROCEDURE — 36415 COLL VENOUS BLD VENIPUNCTURE: CPT

## 2019-02-02 ENCOUNTER — HOSPITAL ENCOUNTER (OUTPATIENT)
Age: 84
Discharge: HOME | End: 2019-02-02
Payer: MEDICARE

## 2019-02-02 LAB
ALBUMIN SERPL-MCNC: 3.9 G/DL (ref 3.5–5.2)
ALP BLD-CCNC: 73 U/L (ref 40–129)
ALT SERPL-CCNC: 16 U/L (ref 0–40)
ANION GAP SERPL CALCULATED.3IONS-SCNC: 13 MMOL/L (ref 7–16)
AST SERPL-CCNC: 19 U/L (ref 0–39)
BACTERIA: ABNORMAL /HPF
BASOPHILS ABSOLUTE: 0.02 E9/L (ref 0–0.2)
BASOPHILS RELATIVE PERCENT: 0.4 % (ref 0–2)
BILIRUB SERPL-MCNC: 0.3 MG/DL (ref 0–1.2)
BILIRUBIN URINE: NEGATIVE
BLOOD, URINE: NEGATIVE
BUN BLDV-MCNC: 55 MG/DL (ref 8–23)
CALCIUM SERPL-MCNC: 8.6 MG/DL (ref 8.6–10.2)
CHLORIDE BLD-SCNC: 107 MMOL/L (ref 98–107)
CLARITY: CLEAR
CO2: 21 MMOL/L (ref 22–29)
COLOR: YELLOW
CREAT SERPL-MCNC: 2.8 MG/DL (ref 0.7–1.2)
CREATININE URINE: 93 MG/DL (ref 40–278)
EOSINOPHILS ABSOLUTE: 0.08 E9/L (ref 0.05–0.5)
EOSINOPHILS RELATIVE PERCENT: 1.7 % (ref 0–6)
EPITHELIAL CELLS, UA: ABNORMAL /HPF
FERRITIN: 100 NG/ML
GFR AFRICAN AMERICAN: 26
GFR NON-AFRICAN AMERICAN: 21 ML/MIN/1.73
GLUCOSE BLD-MCNC: 110 MG/DL (ref 74–99)
GLUCOSE URINE: NEGATIVE MG/DL
HCT VFR BLD CALC: 34.8 % (ref 37–54)
HEMOGLOBIN: 10.8 G/DL (ref 12.5–16.5)
IMMATURE GRANULOCYTES #: 0.02 E9/L
IMMATURE GRANULOCYTES %: 0.4 % (ref 0–5)
INR BLD: 2.1
IRON SATURATION: 34 % (ref 20–55)
IRON: 73 MCG/DL (ref 59–158)
KETONES, URINE: NEGATIVE MG/DL
LEUKOCYTE ESTERASE, URINE: NEGATIVE
LYMPHOCYTES ABSOLUTE: 0.99 E9/L (ref 1.5–4)
LYMPHOCYTES RELATIVE PERCENT: 21.4 % (ref 20–42)
MAGNESIUM: 2.2 MG/DL (ref 1.6–2.6)
MCH RBC QN AUTO: 25.9 PG (ref 26–35)
MCHC RBC AUTO-ENTMCNC: 31 % (ref 32–34.5)
MCV RBC AUTO: 83.5 FL (ref 80–99.9)
MONOCYTES ABSOLUTE: 0.6 E9/L (ref 0.1–0.95)
MONOCYTES RELATIVE PERCENT: 13 % (ref 2–12)
NEUTROPHILS ABSOLUTE: 2.92 E9/L (ref 1.8–7.3)
NEUTROPHILS RELATIVE PERCENT: 63.1 % (ref 43–80)
NITRITE, URINE: NEGATIVE
PARATHYROID HORMONE INTACT: 100 PG/ML (ref 15–65)
PDW BLD-RTO: 14.8 FL (ref 11.5–15)
PH UA: 5.5 (ref 5–9)
PHOSPHORUS: 3.2 MG/DL (ref 2.5–4.5)
PLATELET # BLD: 140 E9/L (ref 130–450)
PMV BLD AUTO: 9.9 FL (ref 7–12)
POTASSIUM SERPL-SCNC: 5.6 MMOL/L (ref 3.5–5)
PROTEIN PROTEIN: 75 MG/DL (ref 0–12)
PROTEIN UA: 30 MG/DL
PROTEIN/CREAT RATIO: 0.8
PROTEIN/CREAT RATIO: 0.8 (ref 0–0.2)
PROTHROMBIN TIME: 23.8 SEC (ref 9.3–12.4)
RBC # BLD: 4.17 E12/L (ref 3.8–5.8)
RBC UA: ABNORMAL /HPF (ref 0–2)
RENAL EPITHELIAL, UA: ABNORMAL /HPF
SODIUM BLD-SCNC: 141 MMOL/L (ref 132–146)
SPECIFIC GRAVITY UA: 1.02 (ref 1–1.03)
TOTAL IRON BINDING CAPACITY: 217 MCG/DL (ref 250–450)
TOTAL PROTEIN: 7 G/DL (ref 6.4–8.3)
URIC ACID, SERUM: 8.8 MG/DL (ref 3.4–7)
UROBILINOGEN, URINE: 0.2 E.U./DL
VITAMIN D 25-HYDROXY: 48 NG/ML (ref 30–100)
WBC # BLD: 4.6 E9/L (ref 4.5–11.5)
WBC UA: ABNORMAL /HPF (ref 0–5)

## 2019-02-02 PROCEDURE — 36415 COLL VENOUS BLD VENIPUNCTURE: CPT

## 2019-02-02 PROCEDURE — 82570 ASSAY OF URINE CREATININE: CPT

## 2019-02-02 PROCEDURE — 85025 COMPLETE CBC W/AUTO DIFF WBC: CPT

## 2019-02-02 PROCEDURE — 82728 ASSAY OF FERRITIN: CPT

## 2019-02-02 PROCEDURE — 84550 ASSAY OF BLOOD/URIC ACID: CPT

## 2019-02-02 PROCEDURE — 83550 IRON BINDING TEST: CPT

## 2019-02-02 PROCEDURE — 83735 ASSAY OF MAGNESIUM: CPT

## 2019-02-02 PROCEDURE — 83540 ASSAY OF IRON: CPT

## 2019-02-02 PROCEDURE — 80053 COMPREHEN METABOLIC PANEL: CPT

## 2019-02-02 PROCEDURE — 84100 ASSAY OF PHOSPHORUS: CPT

## 2019-02-02 PROCEDURE — 83970 ASSAY OF PARATHORMONE: CPT

## 2019-02-02 PROCEDURE — 85610 PROTHROMBIN TIME: CPT

## 2019-02-02 PROCEDURE — 81001 URINALYSIS AUTO W/SCOPE: CPT

## 2019-02-02 PROCEDURE — 84156 ASSAY OF PROTEIN URINE: CPT

## 2019-02-02 PROCEDURE — 82306 VITAMIN D 25 HYDROXY: CPT

## 2019-02-06 ENCOUNTER — HOSPITAL ENCOUNTER (OUTPATIENT)
Age: 84
Discharge: HOME | End: 2019-02-06
Payer: MEDICARE

## 2019-02-06 LAB
ANION GAP SERPL CALCULATED.3IONS-SCNC: 14 MMOL/L (ref 7–16)
BUN BLDV-MCNC: 57 MG/DL (ref 8–23)
CALCIUM SERPL-MCNC: 8.5 MG/DL (ref 8.6–10.2)
CHLORIDE BLD-SCNC: 107 MMOL/L (ref 98–107)
CO2: 20 MMOL/L (ref 22–29)
CREAT SERPL-MCNC: 2.6 MG/DL (ref 0.7–1.2)
GFR AFRICAN AMERICAN: 28
GFR NON-AFRICAN AMERICAN: 23 ML/MIN/1.73
GLUCOSE BLD-MCNC: 110 MG/DL (ref 74–99)
POTASSIUM SERPL-SCNC: 5.2 MMOL/L (ref 3.5–5)
SODIUM BLD-SCNC: 141 MMOL/L (ref 132–146)

## 2019-02-06 PROCEDURE — 80048 BASIC METABOLIC PNL TOTAL CA: CPT

## 2019-02-06 PROCEDURE — 36415 COLL VENOUS BLD VENIPUNCTURE: CPT

## 2019-02-13 ENCOUNTER — HOSPITAL ENCOUNTER (OUTPATIENT)
Age: 84
Discharge: HOME | End: 2019-02-15
Payer: MEDICARE

## 2019-02-13 PROBLEM — I73.9 PVD (PERIPHERAL VASCULAR DISEASE) (HCC): Status: ACTIVE | Noted: 2019-02-13

## 2019-02-13 PROBLEM — N18.4 STAGE 4 CHRONIC KIDNEY DISEASE (HCC): Status: ACTIVE | Noted: 2019-02-13

## 2019-02-13 PROBLEM — N40.0 BENIGN PROSTATIC HYPERPLASIA WITHOUT LOWER URINARY TRACT SYMPTOMS: Status: ACTIVE | Noted: 2019-02-13

## 2019-02-13 PROBLEM — E55.9 VITAMIN D DEFICIENCY: Status: ACTIVE | Noted: 2019-02-13

## 2019-02-13 PROBLEM — I48.20 CHRONIC ATRIAL FIBRILLATION (HCC): Status: ACTIVE | Noted: 2019-02-13

## 2019-02-13 PROBLEM — M47.816 LUMBAR SPONDYLOSIS: Status: ACTIVE | Noted: 2019-02-13

## 2019-02-13 LAB
ALBUMIN SERPL-MCNC: 3.9 G/DL (ref 3.5–5.2)
ALP BLD-CCNC: 67 U/L (ref 40–129)
ALT SERPL-CCNC: 12 U/L (ref 0–40)
ANION GAP SERPL CALCULATED.3IONS-SCNC: 12 MMOL/L (ref 7–16)
AST SERPL-CCNC: 16 U/L (ref 0–39)
BASOPHILS ABSOLUTE: 0.01 E9/L (ref 0–0.2)
BASOPHILS RELATIVE PERCENT: 0.3 % (ref 0–2)
BILIRUB SERPL-MCNC: 0.5 MG/DL (ref 0–1.2)
BUN BLDV-MCNC: 47 MG/DL (ref 8–23)
CALCIUM SERPL-MCNC: 8.7 MG/DL (ref 8.6–10.2)
CHLORIDE BLD-SCNC: 108 MMOL/L (ref 98–107)
CHOLESTEROL, TOTAL: 161 MG/DL (ref 0–199)
CO2: 21 MMOL/L (ref 22–29)
CREAT SERPL-MCNC: 2.4 MG/DL (ref 0.7–1.2)
EOSINOPHILS ABSOLUTE: 0.1 E9/L (ref 0.05–0.5)
EOSINOPHILS RELATIVE PERCENT: 2.7 % (ref 0–6)
GFR AFRICAN AMERICAN: 31
GFR NON-AFRICAN AMERICAN: 25 ML/MIN/1.73
GLUCOSE BLD-MCNC: 75 MG/DL (ref 74–99)
HCT VFR BLD CALC: 33.6 % (ref 37–54)
HDLC SERPL-MCNC: 63 MG/DL
HEMOGLOBIN: 10.3 G/DL (ref 12.5–16.5)
IMMATURE GRANULOCYTES #: 0.01 E9/L
IMMATURE GRANULOCYTES %: 0.3 % (ref 0–5)
INR BLD: 1.9
LDL CHOLESTEROL CALCULATED: 79 MG/DL (ref 0–99)
LYMPHOCYTES ABSOLUTE: 0.89 E9/L (ref 1.5–4)
LYMPHOCYTES RELATIVE PERCENT: 23.7 % (ref 20–42)
MCH RBC QN AUTO: 25.4 PG (ref 26–35)
MCHC RBC AUTO-ENTMCNC: 30.7 % (ref 32–34.5)
MCV RBC AUTO: 82.8 FL (ref 80–99.9)
MONOCYTES ABSOLUTE: 0.45 E9/L (ref 0.1–0.95)
MONOCYTES RELATIVE PERCENT: 12 % (ref 2–12)
NEUTROPHILS ABSOLUTE: 2.3 E9/L (ref 1.8–7.3)
NEUTROPHILS RELATIVE PERCENT: 61 % (ref 43–80)
PDW BLD-RTO: 15 FL (ref 11.5–15)
PLATELET # BLD: 166 E9/L (ref 130–450)
PMV BLD AUTO: 10.6 FL (ref 7–12)
POTASSIUM SERPL-SCNC: 5.5 MMOL/L (ref 3.5–5)
PROTHROMBIN TIME: 22 SEC (ref 9.3–12.4)
RBC # BLD: 4.06 E12/L (ref 3.8–5.8)
SODIUM BLD-SCNC: 141 MMOL/L (ref 132–146)
TOTAL PROTEIN: 6.8 G/DL (ref 6.4–8.3)
TRIGL SERPL-MCNC: 95 MG/DL (ref 0–149)
VITAMIN D 25-HYDROXY: 46 NG/ML (ref 30–100)
VLDLC SERPL CALC-MCNC: 19 MG/DL
WBC # BLD: 3.8 E9/L (ref 4.5–11.5)

## 2019-02-13 PROCEDURE — 85610 PROTHROMBIN TIME: CPT

## 2019-02-13 PROCEDURE — 82306 VITAMIN D 25 HYDROXY: CPT

## 2019-02-13 PROCEDURE — 85025 COMPLETE CBC W/AUTO DIFF WBC: CPT

## 2019-02-13 PROCEDURE — 80053 COMPREHEN METABOLIC PANEL: CPT

## 2019-02-13 PROCEDURE — 80061 LIPID PANEL: CPT

## 2019-03-13 ENCOUNTER — HOSPITAL ENCOUNTER (OUTPATIENT)
Age: 84
Discharge: HOME | End: 2019-03-13
Payer: MEDICARE

## 2019-03-13 LAB
INR BLD: 1.2
PROTHROMBIN TIME: 14.2 SEC (ref 9.3–12.4)

## 2019-03-13 PROCEDURE — 85610 PROTHROMBIN TIME: CPT

## 2019-03-13 PROCEDURE — 36415 COLL VENOUS BLD VENIPUNCTURE: CPT

## 2019-08-03 NOTE — CONSULTS
claudication  Skin: No skin breakdown or rashes  Neuro: No headache, confusion, or seizures, dizziness as noted above  Psych: No depression, anxiety    Family History:  History reviewed. No pertinent family history. Social History:  Social History     Socioeconomic History    Marital status: Single     Spouse name: Not on file    Number of children: Not on file    Years of education: Not on file    Highest education level: Not on file   Occupational History    Not on file   Social Needs    Financial resource strain: Not on file    Food insecurity:     Worry: Not on file     Inability: Not on file    Transportation needs:     Medical: Not on file     Non-medical: Not on file   Tobacco Use    Smoking status: Former Smoker     Packs/day: 0.50     Years: 42.00     Pack years: 21.00     Types: Cigarettes     Start date:      Last attempt to quit:      Years since quittin.6    Smokeless tobacco: Never Used   Substance and Sexual Activity    Alcohol use: No     Comment: occasionally    Drug use: Never    Sexual activity: Not on file   Lifestyle    Physical activity:     Days per week: Not on file     Minutes per session: Not on file    Stress: Not on file   Relationships    Social connections:     Talks on phone: Not on file     Gets together: Not on file     Attends Yazidi service: Not on file     Active member of club or organization: Not on file     Attends meetings of clubs or organizations: Not on file     Relationship status: Not on file    Intimate partner violence:     Fear of current or ex partner: Not on file     Emotionally abused: Not on file     Physically abused: Not on file     Forced sexual activity: Not on file   Other Topics Concern    Not on file   Social History Narrative    Not on file       Allergies:   Allergies   Allergen Reactions    Lipitor      \"makes me weak\"       Home Medications:  Prior to Admission medications    Medication Sig Start Date End Date

## 2019-08-03 NOTE — ED PROVIDER NOTES
Potassium reflex Magnesium 4.9 3.5 - 5.0 mmol/L    Chloride 103 98 - 107 mmol/L    CO2 24 22 - 29 mmol/L    Anion Gap 12 7 - 16 mmol/L    Glucose 84 74 - 99 mg/dL    BUN 41 (H) 8 - 23 mg/dL    CREATININE 2.6 (H) 0.7 - 1.2 mg/dL    GFR Non-African American 23 >=60 mL/min/1.73    GFR African American 28     Calcium 8.5 (L) 8.6 - 10.2 mg/dL    Total Protein 7.0 6.4 - 8.3 g/dL    Alb 4.0 3.5 - 5.2 g/dL    Total Bilirubin 0.3 0.0 - 1.2 mg/dL    Alkaline Phosphatase 72 40 - 129 U/L    ALT 12 0 - 40 U/L    AST 13 0 - 39 U/L   Troponin   Result Value Ref Range    Troponin 0.05 (H) 0.00 - 0.03 ng/mL   Protime-INR   Result Value Ref Range    Protime 23.5 (H) 9.3 - 12.4 sec    INR 2.1    APTT   Result Value Ref Range    aPTT 38.9 (H) 24.5 - 35.1 sec   POCT Venous   Result Value Ref Range    POC Sodium 142 132 - 146 mmol/L    POC Potassium 5.5 (H) 3.5 - 5.0 mmol/L    POC Chloride 112 (H) 100 - 108 mmol/L    POC Glucose 86 74 - 99 mg/dl    POC Creatinine 2.6 (H) 0.7 - 1.2 mg/dL    GFR Non-African American 23 >=60 mL/min/1.73    GFR  28     Performed on SEE BELOW    EKG 12 Lead   Result Value Ref Range    Ventricular Rate 45 BPM    Atrial Rate 44 BPM    QRS Duration 148 ms    Q-T Interval 522 ms    QTc Calculation (Bazett) 451 ms    R Axis -40 degrees    T Axis 36 degrees       RADIOLOGY:  No orders to display       EKG: This EKG is signed and interpreted by me. Rate: 45  Rhythm: Atrial flutter  Interpretation: atrial flutter (chronic)  Comparison: RBBB and Arielle Guzman noted on previous EKG's  ------------------------- NURSING NOTES AND VITALS REVIEWED ---------------------------  Date / Time Roomed:  8/3/2019  7:22 AM  ED Bed Assignment:  4503/4503-B    The nursing notes within the ED encounter and vital signs as below have been reviewed.      Patient Vitals for the past 24 hrs:   BP Temp Temp src Pulse Resp SpO2 Height Weight   08/03/19 0919 (!) 157/63 -- -- 59 -- -- -- --   08/03/19 0855 -- -- -- -- 18 96 % -- --   08/03/19 0719 (!) 178/67 97.8 °F (36.6 °C) Tympanic (!) 49 18 95 % 5' 7\" (1.702 m) 145 lb (65.8 kg)       Oxygen Saturation Interpretation: Normal    Counseling:  I have spoken with the patient and discussed todays results, in addition to providing specific details for the plan of care and counseling regarding the diagnosis and prognosis. Their questions are answered at this time and they are agreeable with the plan of admission.    --------------------------------- ADDITIONAL PROVIDER NOTES ---------------------------------  Consultations:  Time: 0900. Spoke with Dr. Michelle Mcpherson. Discussed case. They will admit the patient. This patient's ED course included: a personal history and physicial examination, re-evaluation prior to disposition, multiple bedside re-evaluations, IV medications, cardiac monitoring and continuous pulse oximetry    This patient has remained hemodynamically stable during their ED course. Diagnosis:  1. Bradycardia    2. Atrial flutter, unspecified type ([address]    3. Dizziness        Disposition:  Patient's disposition: Admit to telemetry  Patient's condition is good.          Mary Hicks DO  Resident  08/03/19 1006

## 2019-08-03 NOTE — PROGRESS NOTES
Admit Date: 8/3/2019    Subjective:     ADMIT NOTE  PATIENT PRESENTS TO ER WITH ABOUT 5 HRS HX OF DIZZYNESS WITH MOVTS. HE DENIED COLD SYMPTOMS OR RECENT TRAUMA. NO NAUSEA OR VOMITING. HE IS ON COUMADIN  EKG SHOWED A.FLUTTER  WITH RATE OF 45  PATIENT HAS A HX OF C. ARRTHMIA  WAS ADMITTED  WAS SEEN BY CARDIOLOGIST IN ER    Objective:     Patient Vitals for the past 8 hrs:   BP Temp Temp src Pulse Resp SpO2   08/03/19 1558 (!) 107/53 97.8 °F (36.6 °C) Oral 53 18 99 %   08/03/19 1400 128/60 -- -- 54 -- --   08/03/19 1019 (!) 181/92 97.6 °F (36.4 °C) Temporal (!) 49 20 98 %   08/03/19 0919 (!) 157/63 -- -- 59 -- --   08/03/19 0855 -- -- -- -- 18 96 %     I/O last 3 completed shifts: In: 240 [P.O.:240]  Out: -   No intake/output data recorded. HEENT: Normal  NECK: Thyroid normal. No carotid bruit. No lymphphadenopathy. CVS: IRREG  RS: Clear. No wheeze. No rhonchi. Good airflow bilaterally. ABD: Soft. Non tender. No mass. Normal BS. BACK: Skin normal.  EXT: No edema. Non tender. Pulses present. Skin intact.   NEURO: Intact/REP DIZZYNESS WITH CHANGE OF POSTURE      Scheduled Meds:   folic acid-pyridoxine-cyancobalamin  1 tablet Oral Daily    hydrALAZINE  50 mg Oral BID    isosorbide mononitrate  60 mg Oral Daily    ocuvite-lutein  1 tablet Oral BID    ramipril  2.5 mg Oral Daily    sodium bicarbonate  650 mg Oral BID    tamsulosin  0.4 mg Oral Daily    vitamin D  1,000 Units Oral BID    warfarin  5 mg Oral Daily     Continuous Infusions:    CBC with Differential:    Lab Results   Component Value Date    WBC 4.6 08/03/2019    RBC 4.06 08/03/2019    HGB 10.4 08/03/2019    HCT 32.9 08/03/2019     08/03/2019    MCV 81.0 08/03/2019    MCH 25.6 08/03/2019    MCHC 31.6 08/03/2019    RDW 14.4 08/03/2019    SEGSPCT 63 03/19/2012    LYMPHOPCT 22.4 08/03/2019    MONOPCT 12.7 08/03/2019    BASOPCT 0.4 08/03/2019    MONOSABS 0.58 08/03/2019    LYMPHSABS 1.02 08/03/2019    EOSABS 0.09 08/03/2019    BASOSABS 0.02

## 2019-08-04 NOTE — PROGRESS NOTES
Admit Date: 8/3/2019    Subjective:   ADMITTED WITH DIZZYNESS/A. FLUTTER/BRADYCARDIC  NOT AMBULATING  SEEN BY CARDIO  MONITOR---45 RATE      Objective:     Patient Vitals for the past 8 hrs:   BP Temp Temp src Pulse Resp SpO2   08/04/19 0814 (!) 176/76 98.1 °F (36.7 °C) Temporal 65 16 95 %   08/04/19 0234 (!) 166/68 -- -- 54 -- --     I/O last 3 completed shifts: In: 780 [P.O.:780]  Out: -   No intake/output data recorded. HEENT: Normal  NECK: Thyroid normal. No carotid bruit. No lymphphadenopathy. CVS: IRREG  RS: Clear. No wheeze. No rhonchi. Good airflow bilaterally. ABD: Soft. Non tender. No mass. Normal BS. BACK: Skin normal.  EXT: No edema. Non tender. Pulses present. Skin intact.   NEURO: Intact      Scheduled Meds:   folic acid-pyridoxine-cyancobalamin  1 tablet Oral Daily    hydrALAZINE  50 mg Oral BID    isosorbide mononitrate  60 mg Oral Daily    ocuvite-lutein  1 tablet Oral BID    ramipril  2.5 mg Oral Daily    sodium bicarbonate  650 mg Oral BID    tamsulosin  0.4 mg Oral Daily    vitamin D  1,000 Units Oral BID    warfarin  5 mg Oral Daily     Continuous Infusions:    CBC with Differential:    Lab Results   Component Value Date    WBC 4.8 08/04/2019    RBC 4.09 08/04/2019    HGB 10.5 08/04/2019    HCT 33.5 08/04/2019     08/04/2019    MCV 81.9 08/04/2019    MCH 25.7 08/04/2019    MCHC 31.3 08/04/2019    RDW 14.5 08/04/2019    SEGSPCT 63 03/19/2012    LYMPHOPCT 22.4 08/03/2019    MONOPCT 12.7 08/03/2019    BASOPCT 0.4 08/03/2019    MONOSABS 0.58 08/03/2019    LYMPHSABS 1.02 08/03/2019    EOSABS 0.09 08/03/2019    BASOSABS 0.02 08/03/2019     CMP:    Lab Results   Component Value Date     08/04/2019    K 5.5 08/04/2019     08/04/2019    CO2 22 08/04/2019    BUN 51 08/04/2019    CREATININE 2.9 08/04/2019    GFRAA 25 08/04/2019    LABGLOM 20 08/04/2019    PROT 7.0 08/03/2019    LABALBU 4.0 08/03/2019    LABALBU 4.2 03/19/2012    CALCIUM 8.4 08/04/2019    BILITOT 0.3

## 2019-08-04 NOTE — H&P
flutter/bradycardia,  and history of essential hypertension and coronary artery disease. PLAN:  We will admit him to a monitored bed. Continue home medications,  which include Coumadin and ask for a Cardiology consult and  Electrophysiology consult as well.         Suzette Hurtado MD    D: 08/03/2019 16:53:25       T: 08/03/2019 22:14:40     [email or web address]  Job#: [id number]     Doc#: [id number]    CC:

## 2019-08-04 NOTE — PROGRESS NOTES
on file     Emotionally abused: Not on file     Physically abused: Not on file     Forced sexual activity: Not on file   Other Topics Concern    Not on file   Social History Narrative    Not on file       Allergies: Allergies   Allergen Reactions    Lipitor      \"makes me weak\"       Home Medications:  Prior to Admission medications    Medication Sig Start Date End Date Taking? Authorizing Provider   Multiple Vitamins-Minerals (PRESERVISION AREDS) TABS Take 1 capsule by mouth 2 times daily   Yes Historical Provider, MD   Folinic Acid-Vit B6-Vit B12 (FOLINIC-PLUS PO) Take 1 tablet by mouth daily   Yes Historical Provider, MD   sodium bicarbonate 650 MG tablet Take 650 mg by mouth 2 times daily   Yes Historical Provider, MD   vitamin D (CHOLECALCIFEROL) 1000 UNIT TABS tablet Take 1,000 Units by mouth 2 times daily    Yes Historical Provider, MD   hydrALAZINE (APRESOLINE) 25 MG tablet Take 50 mg by mouth 2 times daily. Yes Historical Provider, MD   isosorbide mononitrate (IMDUR) 60 MG CR tablet Take 60 mg by mouth daily. Yes Historical Provider, MD   tamsulosin (FLOMAX) 0.4 MG capsule Take 0.4 mg by mouth daily. Yes Historical Provider, MD   warfarin (COUMADIN) 5 MG tablet Take 5 mg by mouth Daily. Yes Historical Provider, MD   ramipril (ALTACE) 2.5 MG capsule Take 2.5 mg by mouth daily.      Yes Historical Provider, MD       Current Medications:  Current Facility-Administered Medications   Medication Dose Route Frequency Provider Last Rate Last Dose    perflutren lipid microspheres (DEFINITY) injection 1.65 mg  1.5 mL Intravenous ONCE PRN Brianna Thurston MD        folic acid-pyridoxine-cyancobalamin (FOLTX) 2.5-25-2 MG TABS 1 tablet  1 tablet Oral Daily Nyasia Deleon MD   1 tablet at 08/03/19 1406    hydrALAZINE (APRESOLINE) tablet 50 mg  50 mg Oral BID Nyasia Deleon MD   50 mg at 08/03/19 2008    isosorbide mononitrate (IMDUR) extended release tablet 60 mg  60 mg Oral Daily

## 2019-08-05 NOTE — PROGRESS NOTES
[facility] [address] progress    Name: Chong Shultz    Age: 80 y.o. Date of Admission: 8/3/2019  7:22 AM    Date of Service: 8/5/2019    Reason for Consultation: bradycardia    Referring Physician: Dr Ankur Campuzano  Primary Care Physician: Sami Warren DO    History of Present Illness: The patient is a 80y.o. year old male with chronic/persistent atrial flutter and right bundle branch block, chronic kidney disease, history of bradycardia documented since at least 2012 presenting this morning to the emergency department at [facility] for lightheadedness which started at 4 AM when he woke up to go to the bathroom. States he sat up and felt the room spinning. Denied any syncope, chest pain, palpitations, shortness breath, nausea or sweating. He states he laid back down and got up again at 6 AM feeling the same so called his family and he was brought to the emergency room. He's been asymptomatic in the emergency room. Telemetry has been showing atrial flutter with slow ventricular response in the 40s sometimes dipping to the upper 30s. I've seen no pauses thus far. He has a history of bypass surgery 1985 and again redo in 1991, permanent A. fib/flutter, chronic kidney disease, hypertension, peripheral vascular disease with previous lower extremity bypass 2005. Echo 2016 cLVH normal EF mild AS, mild to mod MR/TR, AUDIE.    8/4/2019:  Feels fine today no further dizziness or lightheadedness. States occasionally sees double, but this is not new- states had to give up driving because of it. Appetite good. Has been up to [place] several times overnight without incident. Reviewed tele: afib/FL rates 40-50's, no pauses over 3 seconds, slowest HR about 30 for a couple beats while sleeping. 8/5/2019:  Feels fine no dizziness, wants to go home. EP has seen pt.   Reviewed echo EF70% mild to moderate AS  Tele atrial flutter no pauses, rates mostly in the 50's     Past Medical History: Past Medical History:   Diagnosis Date    Acute MI ([facility] [address]     Arrhythmia atrial     CAD (coronary artery disease)     Hypertension        Review of Systems:   Constitutional: No fever, chills, sweats  Cardiac: As per HPI  Pulmonary: No cough, wheeze, hemoptysis  HEENT: No visual disturbances, difficult swallowing, hard of hearing  GI: No nausea, vomiting, diarrhea, abdominal pain, rectal bleeding  : No dysuria or hematuria  Endocrine: No excessive thirst, heat or cold intolerance. Musculoskeletal: No joint pain or muscle aches. No claudication  Skin: No skin breakdown or rashes  Neuro: No headache, confusion, or seizures, dizziness as noted above  Psych: No depression, anxiety    Family History:  History reviewed. No pertinent family history.     Social History:  Social History     Socioeconomic History    Marital status: Single     Spouse name: Not on file    Number of children: Not on file    Years of education: Not on file    Highest education level: Not on file   Occupational History    Not on file   Social Needs    Financial resource strain: Not on file    Food insecurity:     Worry: Not on file     Inability: Not on file    Transportation needs:     Medical: Not on file     Non-medical: Not on file   Tobacco Use    Smoking status: Former Smoker     Packs/day: 0.50     Years: 42.00     Pack years: 21.00     Types: Cigarettes     Start date:      Last attempt to quit:      Years since quittin.6    Smokeless tobacco: Never Used   Substance and Sexual Activity    Alcohol use: No     Comment: occasionally    Drug use: Never    Sexual activity: Not on file   Lifestyle    Physical activity:     Days per week: Not on file     Minutes per session: Not on file    Stress: Not on file   Relationships    Social connections:     Talks on phone: Not on file     Gets together: Not on file     Attends Jehovah's witness service: Not on file     Active member of club or organization: Not on file

## 2019-08-05 NOTE — PROGRESS NOTES
Placed a perfect serve to EP for consult.      Electronically signed by Ginny Gan RN on 8/5/2019 at 8:09 AM

## 2019-08-05 NOTE — CONSULTS
[facility] and [facility] Electrophysiology  Consultation Report  PATIENT: Marcial Polk  MEDICAL RECORD NUMBER: [id number]  DATE OF SERVICE:  8/5/2019  ATTENDING ELECTROPHYSIOLOGIST: Dr. Marialuisa Vargas   PRIMARY ELECTROPHYSIOLOGIST: Dr. Antonino Lewis: Jarad Thompson MD and Katy Mahoney DO  CHIEF COMPLAINT: Dizziness     HPI: This is a 80 y.o. male with a history of   Patient Active Problem List   Diagnosis    Atrial flutter ([address]    HTN (hypertension)    CAD (coronary artery disease)    PVD (peripheral vascular disease) ([address]    RBBB (right bundle branch block with left anterior fascicular block)    Cellulitis of right lower extremity    Acute on chronic renal insufficiency    Cellulitis of right foot    Bradycardia    Typical atrial flutter ([address]    Coronary artery disease involving native coronary artery of native heart without angina pectoris    Essential hypertension    PVD (peripheral vascular disease) ([address]    Lumbar spondylosis    Benign prostatic hyperplasia without lower urinary tract symptoms    Stage 4 chronic kidney disease ([address]    Vitamin D deficiency    Chronic atrial fibrillation ([address]   who presents to the hospital complaining of Dizzyness. Mr. Jose Armando Webb is known to the [address] [place] [address] and saw Dr. Nagi Gonsalves in May of 2016 for bradycardia and pacing therapy was deferred at that point. He has a past medical history of CAD S/P CABG in 1985 with Redo in [facility]. HTN, CKD, Permanent A. Fib/Flutter on chronic warfarin s/p 2 DCCV and PVD s/p BLE bypass in 2005. On the day of admission at 4 AM Mr. Jose Armando Webb was getting up from bed, when laying he felt slight dizziness this dizziness (room spinning) was exacerbated when moving to the sitting position when he went to stand this dizziness was worse and he had to sit back down after sitting back down the dizziness resolved.   When presenting to the ER his heart rates were in the 40's to 50's and lower extremity    Acute on chronic renal insufficiency    Cellulitis of right foot    Bradycardia    Typical atrial flutter (HCC)    Coronary artery disease involving native coronary artery of native heart without angina pectoris    Essential hypertension    PVD (peripheral vascular disease) (HCC)    Lumbar spondylosis    Benign prostatic hyperplasia without lower urinary tract symptoms    Stage 4 chronic kidney disease (HCC)    Vitamin D deficiency    Chronic atrial fibrillation ([facility] [address]        1. Dizziness   - No syncope reported   - clinical description more consistent with vertigo    1. Permanent A.flutter/Fib with slow ventricular response  - Rates in to 40-60   - RBBB/LAFB present since 5/2016  - Thus far only one incidence of dizziness  - No Pauses greater than 5 sec   - no AV node blockers   - [id number] ( vascular disease, age, HTN)   - Coumadin INR slightly subtherapeutic  - 2 prior DCCV     2. CAD   - S/P CABG 1985, redo 1991  - Follow with the [address] to Lipitor     3. HTN   - Uncontrolled   - Ramipril, imdur, hydralazine     4. BPH   - Flomax     6. PVD   - S/p BLE bypass     7. CKD   - creat 2.9 yesterday        Recommendations:  1. No clear indication for pacemaker at this time   2. 30 day outpatient monitor   3. Continue to avoid AV node blocker     I have spent a total of 110 minutes with the patient and the family reviewing the above stated recommendations. And a total of >50% of that time involved face-to-face time providing counseling and or coordination of care with the other providers. Thank you for allowing me to participate in your patient's care. Please call me if there are any questions or concerns.       Discussed with CINDY Reynaga. [facility]  [facility] [place]: [place] [facility]  8:14 Cardiovascular: pertinent positives in HPI  Gastrointestinal: Negative for abdominal pain and blood in stool. All other review of systems are negative     PHYSICAL EXAM:   Vitals:    08/04/19 1622 08/04/19 1937 08/05/19 0512 08/05/19 0828   BP: (!) 150/60 (!) 160/60 (!) 170/60 (!) 184/68   Pulse: (!) 48 51  65   Resp:  19  16   Temp:  97.8 °F (36.6 °C)  98.9 °F (37.2 °C)   TempSrc:  Temporal  Temporal   SpO2:  97%  96%   Weight:       Height:          Constitutional: Well-developed, no acute distress  Eyes: conjunctivae normal, no xanthelasma   Ears, Nose, Throat: oral mucosa moist, no cyanosis   CV: no JVD. IRIR. No murmurs, rubs, or gallops. PMI is nondisplaced  Lungs: clear to auscultation bilaterally, normal respiratory effort without used of accessory muscles  Abdomen: soft, non-tender, bowel sounds present, no masses or hepatomegaly   Musculoskeletal: no digital clubbing, no edema   Skin: warm, no rashes     EKG 8/3/19: atrial flutter with HR of 45, LAD, RBBB, possible LAFB, QTc 451 msec. Telemetry 8/5/19: atrial flutter with HR of 40-50's, HR of 30's at night, no significant pause noted    Echocardiogram 8/5/19: Pending    Assessment/Plan:    1. Vertigo like symptom  - Can not exclude peripheral vertigo. - Consider CNS imaging and ENT consult. - Can not totally exclude significant bradyarrhythmia causing symptom but unlikely. Will schedule 30 days MCOT at discharge. - No definite indication for pacing therapy at the moment. 2.  Permanent atrial flutter with slow ventricular response  - History of  2 prior DC-CV's. - Rates in to 40-50's. - Underlying RBBB and LAFB presented since 5/2016. - Thus far no evidence of complete AV block or pause > 5 seconds. - No AV node blocker agents. - [id number] of 4 ( vascular disease, age, HTN); On Coumadin for stroke risk reduction.       3. Coronary artery disease  - S/p CABG 1985 and redo 1991.  [facility] 4. Hypertension  - Elevated. - On Ramipril, imdur, hydralazine     5. BPH   - On Flomax     6. PVD   - S/p BLE bypass     7. CKD   Estimated Creatinine Clearance: 15 mL/min (A) (based on SCr of 2.9 mg/dL (H)). Recommendations:  1. No definite indication for pacing therapy at the moment. 2. Will schedule 30 day outpatient monitor when discharged home. 3. Avoid AV node blocker agent. I have spent a total of 110 minutes with the patient and the family reviewing the above stated recommendations. And a total of >50% of that time involved face-to-face time providing counseling and or coordination of care with the other providers. Thank you for allowing me to participate in your patient's care. Please call me if there are any questions or concerns.       Asmita Bateman MD  Cardiac Electrophysiology  [facility]  [facility] [place]: [place] [facility]  8:54 AM  8/5/2019

## 2019-08-05 NOTE — PROGRESS NOTES
Admit Date: 8/3/2019      Subjective:     Patient: no acute issues reported overnight  Medication side effects: none    Scheduled Meds:   folic acid-pyridoxine-cyancobalamin  1 tablet Oral Daily    hydrALAZINE  50 mg Oral BID    isosorbide mononitrate  60 mg Oral Daily    ocuvite-lutein  1 tablet Oral BID    ramipril  2.5 mg Oral Daily    sodium bicarbonate  650 mg Oral BID    tamsulosin  0.4 mg Oral Daily    vitamin D  1,000 Units Oral BID    warfarin  5 mg Oral Daily     Continuous Infusions:  PRN Meds:perflutren lipid microspheres    Objective:   I/O last 3 completed shifts: In: 540 [P.O.:540]  Out: -   No intake/output data recorded.     BP (!) 170/60   Pulse 51   Temp 97.8 °F (36.6 °C) (Temporal)   Resp 19   Ht 5' 7\" (1.702 m)   Wt 145 lb (65.8 kg)   SpO2 97%   BMI 22.71 kg/m²   General appearance: alert, appears stated age and cooperative  Skin:negative  Heent:negative  Lungs: clear to auscultation bilaterally  Heart: irregularly irregular rhythm  Abdomen: soft, non-tender; bowel sounds normal; no masses,  no organomegaly  Extremities:no edema  Neurologic: Grossly normal    Labs:  CBC with Differential:    Lab Results   Component Value Date    WBC 4.8 08/04/2019    RBC 4.09 08/04/2019    HGB 10.5 08/04/2019    HCT 33.5 08/04/2019     08/04/2019    MCV 81.9 08/04/2019    MCH 25.7 08/04/2019    MCHC 31.3 08/04/2019    RDW 14.5 08/04/2019    SEGSPCT 63 03/19/2012    LYMPHOPCT 22.4 08/03/2019    MONOPCT 12.7 08/03/2019    BASOPCT 0.4 08/03/2019    MONOSABS 0.58 08/03/2019    LYMPHSABS 1.02 08/03/2019    EOSABS 0.09 08/03/2019    BASOSABS 0.02 08/03/2019     BMP:    Lab Results   Component Value Date     08/04/2019    K 5.5 08/04/2019     08/04/2019    CO2 22 08/04/2019    BUN 51 08/04/2019    LABALBU 4.0 08/03/2019    LABALBU 4.2 03/19/2012    CREATININE 2.9 08/04/2019    CALCIUM 8.4 08/04/2019    GFRAA 25 08/04/2019    LABGLOM 20 08/04/2019     PT/INR:    Lab Results   Component Value Date    PROTIME 22.1 08/05/2019    PROTIME 16.8 04/16/2012    INR 1.9 08/05/2019     Last 3 Troponin:    Lab Results   Component Value Date    TROPONINI 0.05 08/03/2019    TROPONINI 0.04 03/19/2012     TSH:    Lab Results   Component Value Date    TSH 3.930 08/14/2018      Assessment:     1. Vertigo in the setting of ? Bradycardia  2. Chronic atrial fibrillation  3. CKD  4, H/O aortic stenosis  5. PAD  6.  CAD s/p CABG    Plan:       Continue same plan and orders

## 2019-08-05 NOTE — DISCHARGE INSTR - COC
Continuity of Care Form    Patient Name: Carlie Ybarra   :  3/9/1927  MRN:  [id number]    Admit date:  8/3/2019  Discharge date:  ***    Code Status Order: Prior   Advance Directives:   [facility] Documentation     Date/Time Healthcare Directive Type of Healthcare Directive Copy in [facility] [address] Agent's Name Healthcare Agent's Phone Number    19 0253  Yes, patient has an advance directive for healthcare treatment  --  --  --  --  --    19 1020  Yes, patient has an advance directive for healthcare treatment  --  --  --  --  --          Admitting Physician:  Shira Vela DO  PCP: Shira Vela DO    Discharging Nurse: [facility] Unit/Room#: 4503/4503-B  Discharging Unit Phone Number: ***    Emergency Contact:   Extended Emergency Contact Information  Primary Emergency Contact: Martita De La Cruz  Address: [address] Phone: [phone]  Relation: Other  Secondary Emergency Contact: gisele de la cruz  Mobile Phone: [phone]  Relation: Other   needed?  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

## 2019-08-13 PROBLEM — I73.9 PVD (PERIPHERAL VASCULAR DISEASE) (HCC): Status: RESOLVED | Noted: 2019-02-13 | Resolved: 2019-01-01

## 2020-01-01 ENCOUNTER — APPOINTMENT (OUTPATIENT)
Dept: ULTRASOUND IMAGING | Age: 85
DRG: 312 | End: 2020-01-01
Payer: MEDICARE

## 2020-01-01 ENCOUNTER — HOSPITAL ENCOUNTER (OUTPATIENT)
Age: 85
Discharge: HOME | End: 2020-02-29
Payer: MEDICARE

## 2020-01-01 ENCOUNTER — HOSPITAL ENCOUNTER (INPATIENT)
Age: 85
LOS: 4 days | Discharge: NURSING, REHAB OR LONG-TERM CARE | DRG: 312 | End: 2020-01-21
Attending: EMERGENCY MEDICINE | Admitting: FAMILY MEDICINE
Payer: MEDICARE

## 2020-01-01 ENCOUNTER — APPOINTMENT (OUTPATIENT)
Dept: GENERAL RADIOLOGY | Age: 85
DRG: 312 | End: 2020-01-01
Payer: MEDICARE

## 2020-01-01 ENCOUNTER — HOSPITAL ENCOUNTER (OUTPATIENT)
Age: 85
Discharge: HOME | End: 2020-02-12
Payer: MEDICARE

## 2020-01-01 ENCOUNTER — APPOINTMENT (OUTPATIENT)
Dept: GENERAL RADIOLOGY | Age: 85
DRG: 193 | End: 2020-01-01
Payer: MEDICARE

## 2020-01-01 ENCOUNTER — APPOINTMENT (OUTPATIENT)
Dept: GENERAL RADIOLOGY | Age: 85
DRG: 025 | End: 2020-01-01
Payer: MEDICARE

## 2020-01-01 ENCOUNTER — TELEPHONE (OUTPATIENT)
Dept: NON INVASIVE DIAGNOSTICS | Age: 85
End: 2020-01-01

## 2020-01-01 ENCOUNTER — CARE COORDINATION (OUTPATIENT)
Dept: CARE COORDINATION | Age: 85
End: 2020-01-01

## 2020-01-01 ENCOUNTER — APPOINTMENT (OUTPATIENT)
Dept: CT IMAGING | Age: 85
End: 2020-01-01
Payer: MEDICARE

## 2020-01-01 ENCOUNTER — HOSPITAL ENCOUNTER (OUTPATIENT)
Age: 85
Discharge: HOME | End: 2020-01-07
Payer: MEDICARE

## 2020-01-01 ENCOUNTER — APPOINTMENT (OUTPATIENT)
Dept: GENERAL RADIOLOGY | Age: 85
End: 2020-01-01
Payer: MEDICARE

## 2020-01-01 ENCOUNTER — APPOINTMENT (OUTPATIENT)
Dept: CT IMAGING | Age: 85
DRG: 025 | End: 2020-01-01
Payer: MEDICARE

## 2020-01-01 ENCOUNTER — ANESTHESIA (OUTPATIENT)
Dept: OPERATING ROOM | Age: 85
DRG: 025 | End: 2020-01-01
Payer: MEDICARE

## 2020-01-01 ENCOUNTER — HOSPITAL ENCOUNTER (OUTPATIENT)
Age: 85
Discharge: HOME | End: 2020-01-11
Payer: MEDICARE

## 2020-01-01 ENCOUNTER — HOSPITAL ENCOUNTER (INPATIENT)
Age: 85
LOS: 5 days | Discharge: HOSPICE | DRG: 193 | End: 2020-03-25
Attending: EMERGENCY MEDICINE | Admitting: FAMILY MEDICINE
Payer: MEDICARE

## 2020-01-01 ENCOUNTER — HOSPITAL ENCOUNTER (OUTPATIENT)
Age: 85
Setting detail: OBSERVATION
Discharge: OTHER INSTITUTION | End: 2020-02-25
Attending: EMERGENCY MEDICINE | Admitting: FAMILY MEDICINE
Payer: MEDICARE

## 2020-01-01 ENCOUNTER — HOSPITAL ENCOUNTER (INPATIENT)
Age: 85
LOS: 8 days | Discharge: NURSING, REHAB OR LONG-TERM CARE | DRG: 025 | End: 2020-02-21
Attending: EMERGENCY MEDICINE | Admitting: FAMILY MEDICINE
Payer: MEDICARE

## 2020-01-01 ENCOUNTER — APPOINTMENT (OUTPATIENT)
Dept: CT IMAGING | Age: 85
DRG: 312 | End: 2020-01-01
Payer: MEDICARE

## 2020-01-01 ENCOUNTER — HOSPITAL ENCOUNTER (OUTPATIENT)
Age: 85
Discharge: HOME | End: 2020-03-19

## 2020-01-01 ENCOUNTER — ANESTHESIA EVENT (OUTPATIENT)
Dept: OPERATING ROOM | Age: 85
DRG: 025 | End: 2020-01-01
Payer: MEDICARE

## 2020-01-01 VITALS
RESPIRATION RATE: 18 BRPM | OXYGEN SATURATION: 97 % | HEIGHT: 67 IN | SYSTOLIC BLOOD PRESSURE: 130 MMHG | DIASTOLIC BLOOD PRESSURE: 62 MMHG | BODY MASS INDEX: 22.77 KG/M2 | HEART RATE: 74 BPM | TEMPERATURE: 99 F | WEIGHT: 145.06 LBS

## 2020-01-01 VITALS
HEART RATE: 54 BPM | SYSTOLIC BLOOD PRESSURE: 118 MMHG | BODY MASS INDEX: 21.19 KG/M2 | DIASTOLIC BLOOD PRESSURE: 58 MMHG | RESPIRATION RATE: 16 BRPM | OXYGEN SATURATION: 96 % | TEMPERATURE: 97.3 F | WEIGHT: 135 LBS | HEIGHT: 67 IN

## 2020-01-01 VITALS
WEIGHT: 123.6 LBS | TEMPERATURE: 99 F | HEART RATE: 70 BPM | BODY MASS INDEX: 19.36 KG/M2 | RESPIRATION RATE: 18 BRPM | DIASTOLIC BLOOD PRESSURE: 60 MMHG | SYSTOLIC BLOOD PRESSURE: 133 MMHG | OXYGEN SATURATION: 95 %

## 2020-01-01 VITALS — SYSTOLIC BLOOD PRESSURE: 126 MMHG | DIASTOLIC BLOOD PRESSURE: 43 MMHG | OXYGEN SATURATION: 100 %

## 2020-01-01 VITALS
DIASTOLIC BLOOD PRESSURE: 54 MMHG | RESPIRATION RATE: 20 BRPM | SYSTOLIC BLOOD PRESSURE: 127 MMHG | HEIGHT: 67 IN | WEIGHT: 132.2 LBS | TEMPERATURE: 98.5 F | OXYGEN SATURATION: 97 % | BODY MASS INDEX: 20.75 KG/M2 | HEART RATE: 56 BPM

## 2020-01-01 LAB
ABO/RH: NORMAL
ACETAMINOPHEN LEVEL: <5 MCG/ML (ref 10–30)
ALBUMIN SERPL-MCNC: 2.4 G/DL (ref 3.5–5.2)
ALBUMIN SERPL-MCNC: 2.7 G/DL (ref 3.5–5.2)
ALBUMIN SERPL-MCNC: 2.8 G/DL (ref 3.5–5.2)
ALBUMIN SERPL-MCNC: 2.8 G/DL (ref 3.5–5.2)
ALBUMIN SERPL-MCNC: 2.9 G/DL (ref 3.5–5.2)
ALBUMIN SERPL-MCNC: 3 G/DL (ref 3.5–5.2)
ALBUMIN SERPL-MCNC: 3 G/DL (ref 3.5–5.2)
ALBUMIN SERPL-MCNC: 3.2 G/DL (ref 3.5–5.2)
ALBUMIN SERPL-MCNC: 3.3 G/DL (ref 3.5–5.2)
ALBUMIN SERPL-MCNC: 3.3 G/DL (ref 3.5–5.2)
ALBUMIN SERPL-MCNC: 3.5 G/DL (ref 3.5–5.2)
ALBUMIN SERPL-MCNC: 3.5 G/DL (ref 3.5–5.2)
ALBUMIN SERPL-MCNC: 3.7 G/DL (ref 3.5–5.2)
ALBUMIN SERPL-MCNC: 3.9 G/DL (ref 3.5–5.2)
ALP BLD-CCNC: 103 U/L (ref 40–129)
ALP BLD-CCNC: 110 U/L (ref 40–129)
ALP BLD-CCNC: 129 U/L (ref 40–129)
ALP BLD-CCNC: 155 U/L (ref 40–129)
ALP BLD-CCNC: 184 U/L (ref 40–129)
ALP BLD-CCNC: 246 U/L (ref 40–129)
ALP BLD-CCNC: 67 U/L (ref 40–129)
ALP BLD-CCNC: 68 U/L (ref 40–129)
ALP BLD-CCNC: 73 U/L (ref 40–129)
ALP BLD-CCNC: 74 U/L (ref 40–129)
ALP BLD-CCNC: 76 U/L (ref 40–129)
ALP BLD-CCNC: 83 U/L (ref 40–129)
ALP BLD-CCNC: 84 U/L (ref 40–129)
ALP BLD-CCNC: 85 U/L (ref 40–129)
ALP BLD-CCNC: 85 U/L (ref 40–129)
ALP BLD-CCNC: 90 U/L (ref 40–129)
ALT SERPL-CCNC: 10 U/L (ref 0–40)
ALT SERPL-CCNC: 11 U/L (ref 0–40)
ALT SERPL-CCNC: 11 U/L (ref 0–40)
ALT SERPL-CCNC: 12 U/L (ref 0–40)
ALT SERPL-CCNC: 13 U/L (ref 0–40)
ALT SERPL-CCNC: 15 U/L (ref 0–40)
ALT SERPL-CCNC: 5 U/L (ref 0–40)
ALT SERPL-CCNC: 5 U/L (ref 0–40)
ALT SERPL-CCNC: 8 U/L (ref 0–40)
ALT SERPL-CCNC: 8 U/L (ref 0–40)
ALT SERPL-CCNC: 9 U/L (ref 0–40)
ALT SERPL-CCNC: <5 U/L (ref 0–40)
ALT SERPL-CCNC: <5 U/L (ref 0–40)
AMPHETAMINE SCREEN, URINE: NOT DETECTED
ANION GAP SERPL CALCULATED.3IONS-SCNC: 10 MMOL/L (ref 7–16)
ANION GAP SERPL CALCULATED.3IONS-SCNC: 10 MMOL/L (ref 7–16)
ANION GAP SERPL CALCULATED.3IONS-SCNC: 11 MMOL/L (ref 7–16)
ANION GAP SERPL CALCULATED.3IONS-SCNC: 12 MMOL/L (ref 7–16)
ANION GAP SERPL CALCULATED.3IONS-SCNC: 13 MMOL/L (ref 7–16)
ANION GAP SERPL CALCULATED.3IONS-SCNC: 14 MMOL/L (ref 7–16)
ANION GAP SERPL CALCULATED.3IONS-SCNC: 16 MMOL/L (ref 7–16)
ANION GAP SERPL CALCULATED.3IONS-SCNC: 19 MMOL/L (ref 7–16)
ANION GAP SERPL CALCULATED.3IONS-SCNC: 6 MMOL/L (ref 7–16)
ANTIBODY SCREEN: NORMAL
APTT: 30.9 SEC (ref 24.5–35.1)
APTT: 31.3 SEC (ref 24.5–35.1)
APTT: 36.7 SEC (ref 24.5–35.1)
AST SERPL-CCNC: 11 U/L (ref 0–39)
AST SERPL-CCNC: 13 U/L (ref 0–39)
AST SERPL-CCNC: 14 U/L (ref 0–39)
AST SERPL-CCNC: 14 U/L (ref 0–39)
AST SERPL-CCNC: 16 U/L (ref 0–39)
AST SERPL-CCNC: 20 U/L (ref 0–39)
AST SERPL-CCNC: 22 U/L (ref 0–39)
AST SERPL-CCNC: 27 U/L (ref 0–39)
AST SERPL-CCNC: 30 U/L (ref 0–39)
AST SERPL-CCNC: 36 U/L (ref 0–39)
AST SERPL-CCNC: 43 U/L (ref 0–39)
BACTERIA: ABNORMAL /HPF
BARBITURATE SCREEN URINE: NOT DETECTED
BASOPHILS ABSOLUTE: 0.01 E9/L (ref 0–0.2)
BASOPHILS ABSOLUTE: 0.02 E9/L (ref 0–0.2)
BASOPHILS ABSOLUTE: 0.02 E9/L (ref 0–0.2)
BASOPHILS ABSOLUTE: 0.03 E9/L (ref 0–0.2)
BASOPHILS RELATIVE PERCENT: 0.1 % (ref 0–2)
BASOPHILS RELATIVE PERCENT: 0.2 % (ref 0–2)
BASOPHILS RELATIVE PERCENT: 0.2 % (ref 0–2)
BASOPHILS RELATIVE PERCENT: 0.4 % (ref 0–2)
BASOPHILS RELATIVE PERCENT: 0.4 % (ref 0–2)
BASOPHILS RELATIVE PERCENT: 0.7 % (ref 0–2)
BENZODIAZEPINE SCREEN, URINE: NOT DETECTED
BILIRUB SERPL-MCNC: 0.2 MG/DL (ref 0–1.2)
BILIRUB SERPL-MCNC: 0.2 MG/DL (ref 0–1.2)
BILIRUB SERPL-MCNC: 0.3 MG/DL (ref 0–1.2)
BILIRUB SERPL-MCNC: 0.4 MG/DL (ref 0–1.2)
BILIRUB SERPL-MCNC: 0.5 MG/DL (ref 0–1.2)
BILIRUB SERPL-MCNC: 0.5 MG/DL (ref 0–1.2)
BILIRUB SERPL-MCNC: 0.6 MG/DL (ref 0–1.2)
BILIRUB SERPL-MCNC: 0.7 MG/DL (ref 0–1.2)
BILIRUB SERPL-MCNC: 0.8 MG/DL (ref 0–1.2)
BILIRUBIN URINE: NEGATIVE
BLOOD CULTURE, ROUTINE: NORMAL
BLOOD, URINE: ABNORMAL
BLOOD, URINE: ABNORMAL
BLOOD, URINE: NEGATIVE
BUN BLDV-MCNC: 35 MG/DL (ref 8–23)
BUN BLDV-MCNC: 37 MG/DL (ref 8–23)
BUN BLDV-MCNC: 42 MG/DL (ref 8–23)
BUN BLDV-MCNC: 42 MG/DL (ref 8–23)
BUN BLDV-MCNC: 43 MG/DL (ref 8–23)
BUN BLDV-MCNC: 43 MG/DL (ref 8–23)
BUN BLDV-MCNC: 44 MG/DL (ref 8–23)
BUN BLDV-MCNC: 46 MG/DL (ref 8–23)
BUN BLDV-MCNC: 46 MG/DL (ref 8–23)
BUN BLDV-MCNC: 47 MG/DL (ref 8–23)
BUN BLDV-MCNC: 48 MG/DL (ref 8–23)
BUN BLDV-MCNC: 49 MG/DL (ref 8–23)
BUN BLDV-MCNC: 49 MG/DL (ref 8–23)
BUN BLDV-MCNC: 50 MG/DL (ref 8–23)
BUN BLDV-MCNC: 51 MG/DL (ref 8–23)
BUN BLDV-MCNC: 51 MG/DL (ref 8–23)
BUN BLDV-MCNC: 52 MG/DL (ref 8–23)
BUN BLDV-MCNC: 54 MG/DL (ref 8–23)
BUN BLDV-MCNC: 55 MG/DL (ref 8–23)
BUN BLDV-MCNC: 56 MG/DL (ref 8–23)
BUN BLDV-MCNC: 58 MG/DL (ref 8–23)
BUN BLDV-MCNC: 68 MG/DL (ref 8–23)
BUN BLDV-MCNC: 72 MG/DL (ref 8–23)
CALCIUM SERPL-MCNC: 7.2 MG/DL (ref 8.6–10.2)
CALCIUM SERPL-MCNC: 7.6 MG/DL (ref 8.6–10.2)
CALCIUM SERPL-MCNC: 7.7 MG/DL (ref 8.6–10.2)
CALCIUM SERPL-MCNC: 7.7 MG/DL (ref 8.6–10.2)
CALCIUM SERPL-MCNC: 7.9 MG/DL (ref 8.6–10.2)
CALCIUM SERPL-MCNC: 8 MG/DL (ref 8.6–10.2)
CALCIUM SERPL-MCNC: 8.1 MG/DL (ref 8.6–10.2)
CALCIUM SERPL-MCNC: 8.1 MG/DL (ref 8.6–10.2)
CALCIUM SERPL-MCNC: 8.2 MG/DL (ref 8.6–10.2)
CALCIUM SERPL-MCNC: 8.3 MG/DL (ref 8.6–10.2)
CALCIUM SERPL-MCNC: 8.4 MG/DL (ref 8.6–10.2)
CALCIUM SERPL-MCNC: 8.5 MG/DL (ref 8.6–10.2)
CALCIUM SERPL-MCNC: 8.5 MG/DL (ref 8.6–10.2)
CALCIUM SERPL-MCNC: 8.7 MG/DL (ref 8.6–10.2)
CALCIUM SERPL-MCNC: 8.7 MG/DL (ref 8.6–10.2)
CALCIUM SERPL-MCNC: 8.8 MG/DL (ref 8.6–10.2)
CALCIUM SERPL-MCNC: 8.8 MG/DL (ref 8.6–10.2)
CALCIUM SERPL-MCNC: 9.2 MG/DL (ref 8.6–10.2)
CALCIUM SERPL-MCNC: 9.7 MG/DL (ref 8.6–10.2)
CANNABINOID SCREEN URINE: NOT DETECTED
CASTS: ABNORMAL /LPF
CHLORIDE BLD-SCNC: 100 MMOL/L (ref 98–107)
CHLORIDE BLD-SCNC: 101 MMOL/L (ref 98–107)
CHLORIDE BLD-SCNC: 102 MMOL/L (ref 98–107)
CHLORIDE BLD-SCNC: 102 MMOL/L (ref 98–107)
CHLORIDE BLD-SCNC: 103 MMOL/L (ref 98–107)
CHLORIDE BLD-SCNC: 104 MMOL/L (ref 98–107)
CHLORIDE BLD-SCNC: 105 MMOL/L (ref 98–107)
CHLORIDE BLD-SCNC: 106 MMOL/L (ref 98–107)
CHLORIDE BLD-SCNC: 107 MMOL/L (ref 98–107)
CHLORIDE BLD-SCNC: 107 MMOL/L (ref 98–107)
CHLORIDE BLD-SCNC: 108 MMOL/L (ref 98–107)
CHLORIDE BLD-SCNC: 109 MMOL/L (ref 98–107)
CHLORIDE BLD-SCNC: 110 MMOL/L (ref 98–107)
CHLORIDE URINE RANDOM: 27 MMOL/L
CHLORIDE URINE RANDOM: 68 MMOL/L
CHOLESTEROL, TOTAL: 203 MG/DL (ref 0–199)
CHP ED QC CHECK: YES
CLARITY: CLEAR
CO2: 17 MMOL/L (ref 22–29)
CO2: 18 MMOL/L (ref 22–29)
CO2: 19 MMOL/L (ref 22–29)
CO2: 20 MMOL/L (ref 22–29)
CO2: 21 MMOL/L (ref 22–29)
CO2: 22 MMOL/L (ref 22–29)
CO2: 22 MMOL/L (ref 22–29)
CO2: 23 MMOL/L (ref 22–29)
CO2: 25 MMOL/L (ref 22–29)
CO2: 25 MMOL/L (ref 22–29)
CO2: 27 MMOL/L (ref 22–29)
COCAINE METABOLITE SCREEN URINE: NOT DETECTED
COLOR: YELLOW
CREAT SERPL-MCNC: 2.2 MG/DL (ref 0.7–1.2)
CREAT SERPL-MCNC: 2.3 MG/DL (ref 0.7–1.2)
CREAT SERPL-MCNC: 2.4 MG/DL (ref 0.7–1.2)
CREAT SERPL-MCNC: 2.6 MG/DL (ref 0.7–1.2)
CREAT SERPL-MCNC: 2.7 MG/DL (ref 0.7–1.2)
CREAT SERPL-MCNC: 2.7 MG/DL (ref 0.7–1.2)
CREAT SERPL-MCNC: 2.8 MG/DL (ref 0.7–1.2)
CREAT SERPL-MCNC: 2.9 MG/DL (ref 0.7–1.2)
CREAT SERPL-MCNC: 2.9 MG/DL (ref 0.7–1.2)
CREAT SERPL-MCNC: 3 MG/DL (ref 0.7–1.2)
CREAT SERPL-MCNC: 3 MG/DL (ref 0.7–1.2)
CREAT SERPL-MCNC: 3.4 MG/DL (ref 0.7–1.2)
CREAT SERPL-MCNC: 4 MG/DL (ref 0.7–1.2)
CREATININE URINE: 128 MG/DL (ref 40–278)
CREATININE URINE: 166 MG/DL (ref 40–278)
CREATININE URINE: 77 MG/DL (ref 40–278)
CULTURE, BLOOD 2: NORMAL
EKG ATRIAL RATE: 144 BPM
EKG ATRIAL RATE: 38 BPM
EKG ATRIAL RATE: 39 BPM
EKG ATRIAL RATE: 55 BPM
EKG ATRIAL RATE: 82 BPM
EKG Q-T INTERVAL: 502 MS
EKG Q-T INTERVAL: 504 MS
EKG Q-T INTERVAL: 522 MS
EKG Q-T INTERVAL: 538 MS
EKG Q-T INTERVAL: 546 MS
EKG QRS DURATION: 146 MS
EKG QRS DURATION: 148 MS
EKG QRS DURATION: 150 MS
EKG QRS DURATION: 160 MS
EKG QRS DURATION: 164 MS
EKG QTC CALCULATION (BAZETT): 414 MS
EKG QTC CALCULATION (BAZETT): 439 MS
EKG QTC CALCULATION (BAZETT): 459 MS
EKG QTC CALCULATION (BAZETT): 468 MS
EKG QTC CALCULATION (BAZETT): 492 MS
EKG R AXIS: -34 DEGREES
EKG R AXIS: -34 DEGREES
EKG R AXIS: -41 DEGREES
EKG R AXIS: -57 DEGREES
EKG R AXIS: -66 DEGREES
EKG T AXIS: -38 DEGREES
EKG T AXIS: -6 DEGREES
EKG T AXIS: 34 DEGREES
EKG T AXIS: 35 DEGREES
EKG T AXIS: 4 DEGREES
EKG VENTRICULAR RATE: 38 BPM
EKG VENTRICULAR RATE: 39 BPM
EKG VENTRICULAR RATE: 44 BPM
EKG VENTRICULAR RATE: 52 BPM
EKG VENTRICULAR RATE: 58 BPM
EOSINOPHILS ABSOLUTE: 0.02 E9/L (ref 0.05–0.5)
EOSINOPHILS ABSOLUTE: 0.05 E9/L (ref 0.05–0.5)
EOSINOPHILS ABSOLUTE: 0.09 E9/L (ref 0.05–0.5)
EOSINOPHILS ABSOLUTE: 0.11 E9/L (ref 0.05–0.5)
EOSINOPHILS ABSOLUTE: 0.15 E9/L (ref 0.05–0.5)
EOSINOPHILS ABSOLUTE: 0.15 E9/L (ref 0.05–0.5)
EOSINOPHILS ABSOLUTE: 0.22 E9/L (ref 0.05–0.5)
EOSINOPHILS ABSOLUTE: 0.22 E9/L (ref 0.05–0.5)
EOSINOPHILS RELATIVE PERCENT: 0.3 % (ref 0–6)
EOSINOPHILS RELATIVE PERCENT: 0.5 % (ref 0–6)
EOSINOPHILS RELATIVE PERCENT: 1.6 % (ref 0–6)
EOSINOPHILS RELATIVE PERCENT: 1.7 % (ref 0–6)
EOSINOPHILS RELATIVE PERCENT: 3.2 % (ref 0–6)
EOSINOPHILS RELATIVE PERCENT: 3.3 % (ref 0–6)
EOSINOPHILS RELATIVE PERCENT: 3.4 % (ref 0–6)
EOSINOPHILS RELATIVE PERCENT: 4.8 % (ref 0–6)
ETHANOL: <10 MG/DL (ref 0–0.08)
FENTANYL SCREEN, URINE: NOT DETECTED
FERRITIN: 150 NG/ML
GFR AFRICAN AMERICAN: 17
GFR AFRICAN AMERICAN: 21
GFR AFRICAN AMERICAN: 24
GFR AFRICAN AMERICAN: 24
GFR AFRICAN AMERICAN: 25
GFR AFRICAN AMERICAN: 25
GFR AFRICAN AMERICAN: 26
GFR AFRICAN AMERICAN: 27
GFR AFRICAN AMERICAN: 27
GFR AFRICAN AMERICAN: 28
GFR AFRICAN AMERICAN: 31
GFR AFRICAN AMERICAN: 32
GFR AFRICAN AMERICAN: 34
GFR NON-AFRICAN AMERICAN: 14 ML/MIN/1.73
GFR NON-AFRICAN AMERICAN: 17 ML/MIN/1.73
GFR NON-AFRICAN AMERICAN: 20 ML/MIN/1.73
GFR NON-AFRICAN AMERICAN: 21 ML/MIN/1.73
GFR NON-AFRICAN AMERICAN: 22 ML/MIN/1.73
GFR NON-AFRICAN AMERICAN: 22 ML/MIN/1.73
GFR NON-AFRICAN AMERICAN: 23 ML/MIN/1.73
GFR NON-AFRICAN AMERICAN: 25 ML/MIN/1.73
GFR NON-AFRICAN AMERICAN: 27 ML/MIN/1.73
GFR NON-AFRICAN AMERICAN: 28 ML/MIN/1.73
GLUCOSE BLD-MCNC: 100 MG/DL (ref 74–99)
GLUCOSE BLD-MCNC: 101 MG/DL (ref 74–99)
GLUCOSE BLD-MCNC: 103 MG/DL (ref 74–99)
GLUCOSE BLD-MCNC: 106 MG/DL (ref 74–99)
GLUCOSE BLD-MCNC: 109 MG/DL (ref 74–99)
GLUCOSE BLD-MCNC: 111 MG/DL
GLUCOSE BLD-MCNC: 117 MG/DL (ref 74–99)
GLUCOSE BLD-MCNC: 121 MG/DL (ref 74–99)
GLUCOSE BLD-MCNC: 128 MG/DL (ref 74–99)
GLUCOSE BLD-MCNC: 143 MG/DL (ref 74–99)
GLUCOSE BLD-MCNC: 75 MG/DL (ref 74–99)
GLUCOSE BLD-MCNC: 78 MG/DL (ref 74–99)
GLUCOSE BLD-MCNC: 82 MG/DL (ref 74–99)
GLUCOSE BLD-MCNC: 84 MG/DL (ref 74–99)
GLUCOSE BLD-MCNC: 86 MG/DL (ref 74–99)
GLUCOSE BLD-MCNC: 89 MG/DL (ref 74–99)
GLUCOSE BLD-MCNC: 90 MG/DL (ref 74–99)
GLUCOSE BLD-MCNC: 92 MG/DL (ref 74–99)
GLUCOSE BLD-MCNC: 93 MG/DL (ref 74–99)
GLUCOSE BLD-MCNC: 94 MG/DL (ref 74–99)
GLUCOSE BLD-MCNC: 96 MG/DL (ref 74–99)
GLUCOSE BLD-MCNC: 97 MG/DL (ref 74–99)
GLUCOSE URINE: NEGATIVE MG/DL
HCT VFR BLD CALC: 27.6 % (ref 37–54)
HCT VFR BLD CALC: 27.8 % (ref 37–54)
HCT VFR BLD CALC: 30.1 % (ref 37–54)
HCT VFR BLD CALC: 30.6 % (ref 37–54)
HCT VFR BLD CALC: 31.1 % (ref 37–54)
HCT VFR BLD CALC: 31.9 % (ref 37–54)
HCT VFR BLD CALC: 32.7 % (ref 37–54)
HCT VFR BLD CALC: 33.8 % (ref 37–54)
HCT VFR BLD CALC: 34.8 % (ref 37–54)
HCT VFR BLD CALC: 35.9 % (ref 37–54)
HCT VFR BLD CALC: 36.4 % (ref 37–54)
HCT VFR BLD CALC: 37.1 % (ref 37–54)
HCT VFR BLD CALC: 41 % (ref 37–54)
HDLC SERPL-MCNC: 52 MG/DL
HEMOGLOBIN: 10.4 G/DL (ref 12.5–16.5)
HEMOGLOBIN: 10.8 G/DL (ref 12.5–16.5)
HEMOGLOBIN: 10.8 G/DL (ref 12.5–16.5)
HEMOGLOBIN: 10.9 G/DL (ref 12.5–16.5)
HEMOGLOBIN: 11.4 G/DL (ref 12.5–16.5)
HEMOGLOBIN: 12.9 G/DL (ref 12.5–16.5)
HEMOGLOBIN: 8.3 G/DL (ref 12.5–16.5)
HEMOGLOBIN: 8.4 G/DL (ref 12.5–16.5)
HEMOGLOBIN: 9.2 G/DL (ref 12.5–16.5)
HEMOGLOBIN: 9.4 G/DL (ref 12.5–16.5)
HEMOGLOBIN: 9.6 G/DL (ref 12.5–16.5)
HEMOGLOBIN: 9.8 G/DL (ref 12.5–16.5)
HEMOGLOBIN: 9.9 G/DL (ref 12.5–16.5)
HYALINE CASTS: ABNORMAL /LPF (ref 0–2)
IMMATURE GRANULOCYTES #: 0.01 E9/L
IMMATURE GRANULOCYTES #: 0.01 E9/L
IMMATURE GRANULOCYTES #: 0.02 E9/L
IMMATURE GRANULOCYTES #: 0.02 E9/L
IMMATURE GRANULOCYTES #: 0.03 E9/L
IMMATURE GRANULOCYTES %: 0.2 % (ref 0–5)
IMMATURE GRANULOCYTES %: 0.2 % (ref 0–5)
IMMATURE GRANULOCYTES %: 0.3 % (ref 0–5)
IMMATURE GRANULOCYTES %: 0.6 % (ref 0–5)
INR BLD: 1
INR BLD: 1.1
INR BLD: 1.2
INR BLD: 1.2
INR BLD: 1.4
INR BLD: 1.5
INR BLD: 1.6
INR BLD: 1.7
INR BLD: 1.8
INR BLD: 2.5
INR BLD: 2.9
INR BLD: 3
IRON SATURATION: 20 % (ref 20–55)
IRON: 43 MCG/DL (ref 59–158)
KEPPRA: 17 UG/ML (ref 12–46)
KEPPRA: 54 UG/ML (ref 12–46)
KETONES, URINE: ABNORMAL MG/DL
KETONES, URINE: NEGATIVE MG/DL
L. PNEUMOPHILA SEROGP 1 UR AG: NORMAL
LACTIC ACID, SEPSIS: 1.2 MMOL/L (ref 0.5–1.9)
LACTIC ACID, SEPSIS: 1.5 MMOL/L (ref 0.5–1.9)
LACTIC ACID: 1.6 MMOL/L (ref 0.5–2.2)
LDL CHOLESTEROL CALCULATED: 123 MG/DL (ref 0–99)
LEUKOCYTE ESTERASE, URINE: NEGATIVE
LIPASE: 32 U/L (ref 13–60)
LV EF: 63 %
LVEF MODALITY: NORMAL
LYMPHOCYTES ABSOLUTE: 0.59 E9/L (ref 1.5–4)
LYMPHOCYTES ABSOLUTE: 0.62 E9/L (ref 1.5–4)
LYMPHOCYTES ABSOLUTE: 0.64 E9/L (ref 1.5–4)
LYMPHOCYTES ABSOLUTE: 0.65 E9/L (ref 1.5–4)
LYMPHOCYTES ABSOLUTE: 0.74 E9/L (ref 1.5–4)
LYMPHOCYTES ABSOLUTE: 0.74 E9/L (ref 1.5–4)
LYMPHOCYTES ABSOLUTE: 0.87 E9/L (ref 1.5–4)
LYMPHOCYTES ABSOLUTE: 1.04 E9/L (ref 1.5–4)
LYMPHOCYTES RELATIVE PERCENT: 11.3 % (ref 20–42)
LYMPHOCYTES RELATIVE PERCENT: 13.3 % (ref 20–42)
LYMPHOCYTES RELATIVE PERCENT: 15.6 % (ref 20–42)
LYMPHOCYTES RELATIVE PERCENT: 16.3 % (ref 20–42)
LYMPHOCYTES RELATIVE PERCENT: 22.5 % (ref 20–42)
LYMPHOCYTES RELATIVE PERCENT: 6.9 % (ref 20–42)
LYMPHOCYTES RELATIVE PERCENT: 8.5 % (ref 20–42)
LYMPHOCYTES RELATIVE PERCENT: 9.3 % (ref 20–42)
Lab: NORMAL
MAGNESIUM: 1.9 MG/DL (ref 1.6–2.6)
MAGNESIUM: 2 MG/DL (ref 1.6–2.6)
MCH RBC QN AUTO: 25 PG (ref 26–35)
MCH RBC QN AUTO: 25.2 PG (ref 26–35)
MCH RBC QN AUTO: 25.3 PG (ref 26–35)
MCH RBC QN AUTO: 25.3 PG (ref 26–35)
MCH RBC QN AUTO: 25.4 PG (ref 26–35)
MCH RBC QN AUTO: 25.5 PG (ref 26–35)
MCH RBC QN AUTO: 25.6 PG (ref 26–35)
MCH RBC QN AUTO: 25.6 PG (ref 26–35)
MCH RBC QN AUTO: 25.7 PG (ref 26–35)
MCH RBC QN AUTO: 25.9 PG (ref 26–35)
MCH RBC QN AUTO: 26.1 PG (ref 26–35)
MCH RBC QN AUTO: 26.2 PG (ref 26–35)
MCH RBC QN AUTO: 26.4 PG (ref 26–35)
MCHC RBC AUTO-ENTMCNC: 29.4 % (ref 32–34.5)
MCHC RBC AUTO-ENTMCNC: 30.1 % (ref 32–34.5)
MCHC RBC AUTO-ENTMCNC: 30.1 % (ref 32–34.5)
MCHC RBC AUTO-ENTMCNC: 30.2 % (ref 32–34.5)
MCHC RBC AUTO-ENTMCNC: 30.3 % (ref 32–34.5)
MCHC RBC AUTO-ENTMCNC: 30.6 % (ref 32–34.5)
MCHC RBC AUTO-ENTMCNC: 30.7 % (ref 32–34.5)
MCHC RBC AUTO-ENTMCNC: 30.7 % (ref 32–34.5)
MCHC RBC AUTO-ENTMCNC: 30.8 % (ref 32–34.5)
MCHC RBC AUTO-ENTMCNC: 30.9 % (ref 32–34.5)
MCHC RBC AUTO-ENTMCNC: 31 % (ref 32–34.5)
MCHC RBC AUTO-ENTMCNC: 31.3 % (ref 32–34.5)
MCHC RBC AUTO-ENTMCNC: 31.5 % (ref 32–34.5)
MCV RBC AUTO: 81.7 FL (ref 80–99.9)
MCV RBC AUTO: 82.5 FL (ref 80–99.9)
MCV RBC AUTO: 82.9 FL (ref 80–99.9)
MCV RBC AUTO: 83.4 FL (ref 80–99.9)
MCV RBC AUTO: 83.4 FL (ref 80–99.9)
MCV RBC AUTO: 83.6 FL (ref 80–99.9)
MCV RBC AUTO: 83.7 FL (ref 80–99.9)
MCV RBC AUTO: 84 FL (ref 80–99.9)
MCV RBC AUTO: 84.1 FL (ref 80–99.9)
MCV RBC AUTO: 84.7 FL (ref 80–99.9)
MCV RBC AUTO: 84.7 FL (ref 80–99.9)
MCV RBC AUTO: 85.1 FL (ref 80–99.9)
MCV RBC AUTO: 85.1 FL (ref 80–99.9)
METER GLUCOSE: 111 MG/DL (ref 74–99)
METHADONE SCREEN, URINE: NOT DETECTED
MONOCYTES ABSOLUTE: 0.39 E9/L (ref 0.1–0.95)
MONOCYTES ABSOLUTE: 0.4 E9/L (ref 0.1–0.95)
MONOCYTES ABSOLUTE: 0.45 E9/L (ref 0.1–0.95)
MONOCYTES ABSOLUTE: 0.47 E9/L (ref 0.1–0.95)
MONOCYTES ABSOLUTE: 0.53 E9/L (ref 0.1–0.95)
MONOCYTES ABSOLUTE: 0.57 E9/L (ref 0.1–0.95)
MONOCYTES ABSOLUTE: 0.71 E9/L (ref 0.1–0.95)
MONOCYTES ABSOLUTE: 0.72 E9/L (ref 0.1–0.95)
MONOCYTES RELATIVE PERCENT: 10.2 % (ref 2–12)
MONOCYTES RELATIVE PERCENT: 11 % (ref 2–12)
MONOCYTES RELATIVE PERCENT: 12.1 % (ref 2–12)
MONOCYTES RELATIVE PERCENT: 6.7 % (ref 2–12)
MONOCYTES RELATIVE PERCENT: 7.1 % (ref 2–12)
MONOCYTES RELATIVE PERCENT: 7.5 % (ref 2–12)
MONOCYTES RELATIVE PERCENT: 7.5 % (ref 2–12)
MONOCYTES RELATIVE PERCENT: 8.8 % (ref 2–12)
MRSA CULTURE ONLY: NORMAL
NEUTROPHILS ABSOLUTE: 2.85 E9/L (ref 1.8–7.3)
NEUTROPHILS ABSOLUTE: 3.21 E9/L (ref 1.8–7.3)
NEUTROPHILS ABSOLUTE: 3.23 E9/L (ref 1.8–7.3)
NEUTROPHILS ABSOLUTE: 4.08 E9/L (ref 1.8–7.3)
NEUTROPHILS ABSOLUTE: 4.69 E9/L (ref 1.8–7.3)
NEUTROPHILS ABSOLUTE: 5.46 E9/L (ref 1.8–7.3)
NEUTROPHILS ABSOLUTE: 6.29 E9/L (ref 1.8–7.3)
NEUTROPHILS ABSOLUTE: 7.96 E9/L (ref 1.8–7.3)
NEUTROPHILS RELATIVE PERCENT: 61.5 % (ref 43–80)
NEUTROPHILS RELATIVE PERCENT: 68.3 % (ref 43–80)
NEUTROPHILS RELATIVE PERCENT: 70.7 % (ref 43–80)
NEUTROPHILS RELATIVE PERCENT: 71.9 % (ref 43–80)
NEUTROPHILS RELATIVE PERCENT: 78.5 % (ref 43–80)
NEUTROPHILS RELATIVE PERCENT: 82 % (ref 43–80)
NEUTROPHILS RELATIVE PERCENT: 83.7 % (ref 43–80)
NEUTROPHILS RELATIVE PERCENT: 84.7 % (ref 43–80)
NITRITE, URINE: NEGATIVE
OPIATE SCREEN URINE: NOT DETECTED
OSMOLALITY URINE: 501 MOSM/KG (ref 300–900)
OVALOCYTES: ABNORMAL
OXYCODONE URINE: NOT DETECTED
PARATHYROID HORMONE INTACT: 96 PG/ML (ref 15–65)
PDW BLD-RTO: 13.3 FL (ref 11.5–15)
PDW BLD-RTO: 13.4 FL (ref 11.5–15)
PDW BLD-RTO: 13.8 FL (ref 11.5–15)
PDW BLD-RTO: 14 FL (ref 11.5–15)
PDW BLD-RTO: 14 FL (ref 11.5–15)
PDW BLD-RTO: 14.2 FL (ref 11.5–15)
PDW BLD-RTO: 14.3 FL (ref 11.5–15)
PDW BLD-RTO: 14.4 FL (ref 11.5–15)
PDW BLD-RTO: 14.5 FL (ref 11.5–15)
PDW BLD-RTO: 14.6 FL (ref 11.5–15)
PDW BLD-RTO: 14.7 FL (ref 11.5–15)
PH UA: 5 (ref 5–9)
PH UA: 5.5 (ref 5–9)
PH UA: 6 (ref 5–9)
PHENCYCLIDINE SCREEN URINE: NOT DETECTED
PHOSPHORUS: 2.4 MG/DL (ref 2.5–4.5)
PHOSPHORUS: 2.9 MG/DL (ref 2.5–4.5)
PHOSPHORUS: 3.3 MG/DL (ref 2.5–4.5)
PHOSPHORUS: 3.5 MG/DL (ref 2.5–4.5)
PHOSPHORUS: 3.6 MG/DL (ref 2.5–4.5)
PHOSPHORUS: 3.9 MG/DL (ref 2.5–4.5)
PLATELET # BLD: 136 E9/L (ref 130–450)
PLATELET # BLD: 145 E9/L (ref 130–450)
PLATELET # BLD: 148 E9/L (ref 130–450)
PLATELET # BLD: 149 E9/L (ref 130–450)
PLATELET # BLD: 150 E9/L (ref 130–450)
PLATELET # BLD: 153 E9/L (ref 130–450)
PLATELET # BLD: 157 E9/L (ref 130–450)
PLATELET # BLD: 158 E9/L (ref 130–450)
PLATELET # BLD: 163 E9/L (ref 130–450)
PLATELET # BLD: 164 E9/L (ref 130–450)
PLATELET # BLD: 169 E9/L (ref 130–450)
PLATELET # BLD: 177 E9/L (ref 130–450)
PLATELET # BLD: 183 E9/L (ref 130–450)
PMV BLD AUTO: 10 FL (ref 7–12)
PMV BLD AUTO: 10 FL (ref 7–12)
PMV BLD AUTO: 10.1 FL (ref 7–12)
PMV BLD AUTO: 10.2 FL (ref 7–12)
PMV BLD AUTO: 8.9 FL (ref 7–12)
PMV BLD AUTO: 9 FL (ref 7–12)
PMV BLD AUTO: 9.3 FL (ref 7–12)
PMV BLD AUTO: 9.4 FL (ref 7–12)
PMV BLD AUTO: 9.6 FL (ref 7–12)
PMV BLD AUTO: 9.6 FL (ref 7–12)
PMV BLD AUTO: 9.7 FL (ref 7–12)
PMV BLD AUTO: 9.7 FL (ref 7–12)
PMV BLD AUTO: 9.8 FL (ref 7–12)
POIKILOCYTES: ABNORMAL
POTASSIUM REFLEX MAGNESIUM: 4.4 MMOL/L (ref 3.5–5)
POTASSIUM REFLEX MAGNESIUM: 4.5 MMOL/L (ref 3.5–5)
POTASSIUM REFLEX MAGNESIUM: 4.7 MMOL/L (ref 3.5–5)
POTASSIUM REFLEX MAGNESIUM: 4.8 MMOL/L (ref 3.5–5)
POTASSIUM REFLEX MAGNESIUM: 4.9 MMOL/L (ref 3.5–5)
POTASSIUM REFLEX MAGNESIUM: 5 MMOL/L (ref 3.5–5)
POTASSIUM SERPL-SCNC: 3.6 MMOL/L (ref 3.5–5)
POTASSIUM SERPL-SCNC: 3.9 MMOL/L (ref 3.5–5)
POTASSIUM SERPL-SCNC: 4 MMOL/L (ref 3.5–5)
POTASSIUM SERPL-SCNC: 4.3 MMOL/L (ref 3.5–5)
POTASSIUM SERPL-SCNC: 4.4 MMOL/L (ref 3.5–5)
POTASSIUM SERPL-SCNC: 4.5 MMOL/L (ref 3.5–5)
POTASSIUM SERPL-SCNC: 4.7 MMOL/L (ref 3.5–5)
POTASSIUM SERPL-SCNC: 4.8 MMOL/L (ref 3.5–5)
POTASSIUM SERPL-SCNC: 4.9 MMOL/L (ref 3.5–5)
POTASSIUM SERPL-SCNC: 5 MMOL/L (ref 3.5–5)
POTASSIUM SERPL-SCNC: 5.2 MMOL/L (ref 3.5–5)
POTASSIUM SERPL-SCNC: 5.4 MMOL/L (ref 3.5–5)
POTASSIUM SERPL-SCNC: 5.5 MMOL/L (ref 3.5–5)
POTASSIUM SERPL-SCNC: 5.7 MMOL/L (ref 3.5–5)
POTASSIUM SERPL-SCNC: 6.4 MMOL/L (ref 3.5–5)
POTASSIUM SERPL-SCNC: 6.4 MMOL/L (ref 3.5–5)
PRO-BNP: 2941 PG/ML (ref 0–450)
PRO-BNP: 5729 PG/ML (ref 0–450)
PROCALCITONIN: 0.25 NG/ML (ref 0–0.08)
PROTEIN PROTEIN: 116 MG/DL (ref 0–12)
PROTEIN PROTEIN: 238 MG/DL (ref 0–12)
PROTEIN UA: 100 MG/DL
PROTEIN UA: >=300 MG/DL
PROTEIN/CREAT RATIO: 1.4
PROTEIN/CREAT RATIO: 1.4 (ref 0–0.2)
PROTEIN/CREAT RATIO: 1.5
PROTEIN/CREAT RATIO: 1.5 (ref 0–0.2)
PROTHROMBIN TIME: 11.7 SEC (ref 9.3–12.4)
PROTHROMBIN TIME: 11.7 SEC (ref 9.3–12.4)
PROTHROMBIN TIME: 11.8 SEC (ref 9.3–12.4)
PROTHROMBIN TIME: 11.9 SEC (ref 9.3–12.4)
PROTHROMBIN TIME: 12.4 SEC (ref 9.3–12.4)
PROTHROMBIN TIME: 12.6 SEC (ref 9.3–12.4)
PROTHROMBIN TIME: 12.6 SEC (ref 9.3–12.4)
PROTHROMBIN TIME: 12.8 SEC (ref 9.3–12.4)
PROTHROMBIN TIME: 13 SEC (ref 9.3–12.4)
PROTHROMBIN TIME: 13.3 SEC (ref 9.3–12.4)
PROTHROMBIN TIME: 15.3 SEC (ref 9.3–12.4)
PROTHROMBIN TIME: 17 SEC (ref 9.3–12.4)
PROTHROMBIN TIME: 18.3 SEC (ref 9.3–12.4)
PROTHROMBIN TIME: 19.7 SEC (ref 9.3–12.4)
PROTHROMBIN TIME: 19.8 SEC (ref 9.3–12.4)
PROTHROMBIN TIME: 29.2 SEC (ref 9.3–12.4)
PROTHROMBIN TIME: 33.2 SEC (ref 9.3–12.4)
PROTHROMBIN TIME: 34.3 SEC (ref 9.3–12.4)
RBC # BLD: 3.28 E12/L (ref 3.8–5.8)
RBC # BLD: 3.31 E12/L (ref 3.8–5.8)
RBC # BLD: 3.6 E12/L (ref 3.8–5.8)
RBC # BLD: 3.67 E12/L (ref 3.8–5.8)
RBC # BLD: 3.67 E12/L (ref 3.8–5.8)
RBC # BLD: 3.85 E12/L (ref 3.8–5.8)
RBC # BLD: 3.92 E12/L (ref 3.8–5.8)
RBC # BLD: 3.99 E12/L (ref 3.8–5.8)
RBC # BLD: 4.09 E12/L (ref 3.8–5.8)
RBC # BLD: 4.29 E12/L (ref 3.8–5.8)
RBC # BLD: 4.36 E12/L (ref 3.8–5.8)
RBC # BLD: 4.41 E12/L (ref 3.8–5.8)
RBC # BLD: 5.02 E12/L (ref 3.8–5.8)
RBC UA: ABNORMAL /HPF (ref 0–2)
SALICYLATE, SERUM: <0.3 MG/DL (ref 0–30)
SODIUM BLD-SCNC: 136 MMOL/L (ref 132–146)
SODIUM BLD-SCNC: 137 MMOL/L (ref 132–146)
SODIUM BLD-SCNC: 138 MMOL/L (ref 132–146)
SODIUM BLD-SCNC: 139 MMOL/L (ref 132–146)
SODIUM BLD-SCNC: 140 MMOL/L (ref 132–146)
SODIUM BLD-SCNC: 141 MMOL/L (ref 132–146)
SODIUM BLD-SCNC: 142 MMOL/L (ref 132–146)
SODIUM BLD-SCNC: 142 MMOL/L (ref 132–146)
SODIUM BLD-SCNC: 144 MMOL/L (ref 132–146)
SODIUM URINE: 52 MMOL/L
SODIUM URINE: 74 MMOL/L
SPECIFIC GRAVITY UA: 1.02 (ref 1–1.03)
SPECIFIC GRAVITY UA: >=1.03 (ref 1–1.03)
STREP PNEUMONIAE ANTIGEN, URINE: NORMAL
TOTAL CK: 46 U/L (ref 20–200)
TOTAL CK: 74 U/L (ref 20–200)
TOTAL IRON BINDING CAPACITY: 214 MCG/DL (ref 250–450)
TOTAL PROTEIN: 5.6 G/DL (ref 6.4–8.3)
TOTAL PROTEIN: 5.7 G/DL (ref 6.4–8.3)
TOTAL PROTEIN: 5.8 G/DL (ref 6.4–8.3)
TOTAL PROTEIN: 5.9 G/DL (ref 6.4–8.3)
TOTAL PROTEIN: 6 G/DL (ref 6.4–8.3)
TOTAL PROTEIN: 6 G/DL (ref 6.4–8.3)
TOTAL PROTEIN: 6.2 G/DL (ref 6.4–8.3)
TOTAL PROTEIN: 6.2 G/DL (ref 6.4–8.3)
TOTAL PROTEIN: 6.3 G/DL (ref 6.4–8.3)
TOTAL PROTEIN: 6.4 G/DL (ref 6.4–8.3)
TOTAL PROTEIN: 6.5 G/DL (ref 6.4–8.3)
TOTAL PROTEIN: 6.7 G/DL (ref 6.4–8.3)
TOTAL PROTEIN: 7 G/DL (ref 6.4–8.3)
TOTAL PROTEIN: 7 G/DL (ref 6.4–8.3)
TOTAL PROTEIN: 7.3 G/DL (ref 6.4–8.3)
TOTAL PROTEIN: 8 G/DL (ref 6.4–8.3)
TRICYCLIC ANTIDEPRESSANTS SCREEN SERUM: NEGATIVE NG/ML
TRIGL SERPL-MCNC: 139 MG/DL (ref 0–149)
TROPONIN: 0.05 NG/ML (ref 0–0.03)
TROPONIN: 0.06 NG/ML (ref 0–0.03)
TROPONIN: 0.09 NG/ML (ref 0–0.03)
TROPONIN: 0.1 NG/ML (ref 0–0.03)
TROPONIN: 0.1 NG/ML (ref 0–0.03)
TSH SERPL DL<=0.05 MIU/L-ACNC: 1.87 UIU/ML (ref 0.27–4.2)
URINE CULTURE, ROUTINE: NORMAL
URINE CULTURE, ROUTINE: NORMAL
UROBILINOGEN, URINE: 0.2 E.U./DL
VANCOMYCIN RANDOM: 9.5 MCG/ML (ref 5–40)
VANCOMYCIN TROUGH: 15 MCG/ML (ref 5–16)
VITAMIN D 25-HYDROXY: 38 NG/ML (ref 30–100)
VLDLC SERPL CALC-MCNC: 28 MG/DL
WBC # BLD: 4.3 E9/L (ref 4.5–11.5)
WBC # BLD: 4.5 E9/L (ref 4.5–11.5)
WBC # BLD: 4.6 E9/L (ref 4.5–11.5)
WBC # BLD: 4.6 E9/L (ref 4.5–11.5)
WBC # BLD: 4.7 E9/L (ref 4.5–11.5)
WBC # BLD: 5 E9/L (ref 4.5–11.5)
WBC # BLD: 5.2 E9/L (ref 4.5–11.5)
WBC # BLD: 6.5 E9/L (ref 4.5–11.5)
WBC # BLD: 6.7 E9/L (ref 4.5–11.5)
WBC # BLD: 6.9 E9/L (ref 4.5–11.5)
WBC # BLD: 7.5 E9/L (ref 4.5–11.5)
WBC # BLD: 8 E9/L (ref 4.5–11.5)
WBC # BLD: 9.4 E9/L (ref 4.5–11.5)
WBC UA: ABNORMAL /HPF (ref 0–5)

## 2020-01-01 PROCEDURE — 80053 COMPREHEN METABOLIC PANEL: CPT

## 2020-01-01 PROCEDURE — 87088 URINE BACTERIA CULTURE: CPT

## 2020-01-01 PROCEDURE — 97530 THERAPEUTIC ACTIVITIES: CPT

## 2020-01-01 PROCEDURE — 2000000000 HC ICU R&B

## 2020-01-01 PROCEDURE — 83550 IRON BINDING TEST: CPT

## 2020-01-01 PROCEDURE — 92523 SPEECH SOUND LANG COMPREHEN: CPT

## 2020-01-01 PROCEDURE — 6370000000 HC RX 637 (ALT 250 FOR IP): Performed by: NURSE PRACTITIONER

## 2020-01-01 PROCEDURE — 6370000000 HC RX 637 (ALT 250 FOR IP): Performed by: INTERNAL MEDICINE

## 2020-01-01 PROCEDURE — 6360000002 HC RX W HCPCS: Performed by: NURSE PRACTITIONER

## 2020-01-01 PROCEDURE — 72125 CT NECK SPINE W/O DYE: CPT

## 2020-01-01 PROCEDURE — 2700000000 HC OXYGEN THERAPY PER DAY

## 2020-01-01 PROCEDURE — 94640 AIRWAY INHALATION TREATMENT: CPT

## 2020-01-01 PROCEDURE — 82570 ASSAY OF URINE CREATININE: CPT

## 2020-01-01 PROCEDURE — 80061 LIPID PANEL: CPT

## 2020-01-01 PROCEDURE — 93010 ELECTROCARDIOGRAM REPORT: CPT | Performed by: INTERNAL MEDICINE

## 2020-01-01 PROCEDURE — 2580000003 HC RX 258

## 2020-01-01 PROCEDURE — G0378 HOSPITAL OBSERVATION PER HR: HCPCS

## 2020-01-01 PROCEDURE — 6360000002 HC RX W HCPCS: Performed by: FAMILY MEDICINE

## 2020-01-01 PROCEDURE — 2500000003 HC RX 250 WO HCPCS

## 2020-01-01 PROCEDURE — 2060000000 HC ICU INTERMEDIATE R&B

## 2020-01-01 PROCEDURE — 85027 COMPLETE CBC AUTOMATED: CPT

## 2020-01-01 PROCEDURE — 6370000000 HC RX 637 (ALT 250 FOR IP): Performed by: NEUROLOGICAL SURGERY

## 2020-01-01 PROCEDURE — 84100 ASSAY OF PHOSPHORUS: CPT

## 2020-01-01 PROCEDURE — 99223 1ST HOSP IP/OBS HIGH 75: CPT | Performed by: NURSE PRACTITIONER

## 2020-01-01 PROCEDURE — 86901 BLOOD TYPING SEROLOGIC RH(D): CPT

## 2020-01-01 PROCEDURE — 85610 PROTHROMBIN TIME: CPT

## 2020-01-01 PROCEDURE — 97535 SELF CARE MNGMENT TRAINING: CPT

## 2020-01-01 PROCEDURE — 36415 COLL VENOUS BLD VENIPUNCTURE: CPT

## 2020-01-01 PROCEDURE — 2580000003 HC RX 258: Performed by: EMERGENCY MEDICINE

## 2020-01-01 PROCEDURE — 83605 ASSAY OF LACTIC ACID: CPT

## 2020-01-01 PROCEDURE — 80048 BASIC METABOLIC PNL TOTAL CA: CPT

## 2020-01-01 PROCEDURE — 2580000003 HC RX 258: Performed by: FAMILY MEDICINE

## 2020-01-01 PROCEDURE — 1200000000 HC SEMI PRIVATE

## 2020-01-01 PROCEDURE — 85025 COMPLETE CBC W/AUTO DIFF WBC: CPT

## 2020-01-01 PROCEDURE — 6370000000 HC RX 637 (ALT 250 FOR IP): Performed by: FAMILY MEDICINE

## 2020-01-01 PROCEDURE — APPSS15 APP SPLIT SHARED TIME 0-15 MINUTES: Performed by: NURSE PRACTITIONER

## 2020-01-01 PROCEDURE — 86900 BLOOD TYPING SEROLOGIC ABO: CPT

## 2020-01-01 PROCEDURE — 81001 URINALYSIS AUTO W/SCOPE: CPT

## 2020-01-01 PROCEDURE — 2580000003 HC RX 258: Performed by: INTERNAL MEDICINE

## 2020-01-01 PROCEDURE — 6370000000 HC RX 637 (ALT 250 FOR IP): Performed by: SURGERY

## 2020-01-01 PROCEDURE — 3600000015 HC SURGERY LEVEL 5 ADDTL 15MIN: Performed by: NEUROLOGICAL SURGERY

## 2020-01-01 PROCEDURE — 2580000003 HC RX 258: Performed by: STUDENT IN AN ORGANIZED HEALTH CARE EDUCATION/TRAINING PROGRAM

## 2020-01-01 PROCEDURE — 6360000002 HC RX W HCPCS: Performed by: INTERNAL MEDICINE

## 2020-01-01 PROCEDURE — 6360000002 HC RX W HCPCS: Performed by: NEUROLOGICAL SURGERY

## 2020-01-01 PROCEDURE — 3600000005 HC SURGERY LEVEL 5 BASE: Performed by: NEUROLOGICAL SURGERY

## 2020-01-01 PROCEDURE — 93005 ELECTROCARDIOGRAM TRACING: CPT | Performed by: EMERGENCY MEDICINE

## 2020-01-01 PROCEDURE — 80307 DRUG TEST PRSMV CHEM ANLYZR: CPT

## 2020-01-01 PROCEDURE — 94760 N-INVAS EAR/PLS OXIMETRY 1: CPT

## 2020-01-01 PROCEDURE — 83690 ASSAY OF LIPASE: CPT

## 2020-01-01 PROCEDURE — 73030 X-RAY EXAM OF SHOULDER: CPT

## 2020-01-01 PROCEDURE — 6360000002 HC RX W HCPCS: Performed by: SURGERY

## 2020-01-01 PROCEDURE — 7100000001 HC PACU RECOVERY - ADDTL 15 MIN: Performed by: NEUROLOGICAL SURGERY

## 2020-01-01 PROCEDURE — 6360000002 HC RX W HCPCS: Performed by: STUDENT IN AN ORGANIZED HEALTH CARE EDUCATION/TRAINING PROGRAM

## 2020-01-01 PROCEDURE — 71045 X-RAY EXAM CHEST 1 VIEW: CPT

## 2020-01-01 PROCEDURE — 97162 PT EVAL MOD COMPLEX 30 MIN: CPT

## 2020-01-01 PROCEDURE — 99233 SBSQ HOSP IP/OBS HIGH 50: CPT | Performed by: SURGERY

## 2020-01-01 PROCEDURE — 6360000002 HC RX W HCPCS

## 2020-01-01 PROCEDURE — 97164 PT RE-EVAL EST PLAN CARE: CPT

## 2020-01-01 PROCEDURE — 83735 ASSAY OF MAGNESIUM: CPT

## 2020-01-01 PROCEDURE — 2580000003 HC RX 258: Performed by: NEUROLOGICAL SURGERY

## 2020-01-01 PROCEDURE — 80177 DRUG SCRN QUAN LEVETIRACETAM: CPT

## 2020-01-01 PROCEDURE — 93306 TTE W/DOPPLER COMPLETE: CPT

## 2020-01-01 PROCEDURE — 97161 PT EVAL LOW COMPLEX 20 MIN: CPT

## 2020-01-01 PROCEDURE — 99285 EMERGENCY DEPT VISIT HI MDM: CPT

## 2020-01-01 PROCEDURE — 6360000002 HC RX W HCPCS: Performed by: EMERGENCY MEDICINE

## 2020-01-01 PROCEDURE — 009430Z DRAINAGE OF INTRACRANIAL SUBDURAL SPACE WITH DRAINAGE DEVICE, PERCUTANEOUS APPROACH: ICD-10-PCS | Performed by: NEUROLOGICAL SURGERY

## 2020-01-01 PROCEDURE — 2709999900 HC NON-CHARGEABLE SUPPLY: Performed by: NEUROLOGICAL SURGERY

## 2020-01-01 PROCEDURE — 83540 ASSAY OF IRON: CPT

## 2020-01-01 PROCEDURE — 00C43ZZ EXTIRPATION OF MATTER FROM INTRACRANIAL SUBDURAL SPACE, PERCUTANEOUS APPROACH: ICD-10-PCS | Performed by: NEUROLOGICAL SURGERY

## 2020-01-01 PROCEDURE — 2140000000 HC CCU INTERMEDIATE R&B

## 2020-01-01 PROCEDURE — 51701 INSERT BLADDER CATHETER: CPT

## 2020-01-01 PROCEDURE — 70450 CT HEAD/BRAIN W/O DYE: CPT

## 2020-01-01 PROCEDURE — 97166 OT EVAL MOD COMPLEX 45 MIN: CPT

## 2020-01-01 PROCEDURE — 96366 THER/PROPH/DIAG IV INF ADDON: CPT

## 2020-01-01 PROCEDURE — 84484 ASSAY OF TROPONIN QUANT: CPT

## 2020-01-01 PROCEDURE — 84156 ASSAY OF PROTEIN URINE: CPT

## 2020-01-01 PROCEDURE — 82306 VITAMIN D 25 HYDROXY: CPT

## 2020-01-01 PROCEDURE — 96365 THER/PROPH/DIAG IV INF INIT: CPT

## 2020-01-01 PROCEDURE — 84132 ASSAY OF SERUM POTASSIUM: CPT

## 2020-01-01 PROCEDURE — 51702 INSERT TEMP BLADDER CATH: CPT

## 2020-01-01 PROCEDURE — 2500000003 HC RX 250 WO HCPCS: Performed by: NURSE PRACTITIONER

## 2020-01-01 PROCEDURE — 87040 BLOOD CULTURE FOR BACTERIA: CPT

## 2020-01-01 PROCEDURE — C1713 ANCHOR/SCREW BN/BN,TIS/BN: HCPCS | Performed by: NEUROLOGICAL SURGERY

## 2020-01-01 PROCEDURE — 97530 THERAPEUTIC ACTIVITIES: CPT | Performed by: PHYSICAL THERAPIST

## 2020-01-01 PROCEDURE — 3700000000 HC ANESTHESIA ATTENDED CARE: Performed by: NEUROLOGICAL SURGERY

## 2020-01-01 PROCEDURE — 99233 SBSQ HOSP IP/OBS HIGH 50: CPT | Performed by: NURSE PRACTITIONER

## 2020-01-01 PROCEDURE — 7100000000 HC PACU RECOVERY - FIRST 15 MIN: Performed by: NEUROLOGICAL SURGERY

## 2020-01-01 PROCEDURE — 87450 HC DIRECT STREP B ANTIGEN: CPT

## 2020-01-01 PROCEDURE — 83935 ASSAY OF URINE OSMOLALITY: CPT

## 2020-01-01 PROCEDURE — APPSS60 APP SPLIT SHARED TIME 46-60 MINUTES: Performed by: NURSE PRACTITIONER

## 2020-01-01 PROCEDURE — 82728 ASSAY OF FERRITIN: CPT

## 2020-01-01 PROCEDURE — 82550 ASSAY OF CK (CPK): CPT

## 2020-01-01 PROCEDURE — C9132 KCENTRA, PER I.U.: HCPCS | Performed by: EMERGENCY MEDICINE

## 2020-01-01 PROCEDURE — 93005 ELECTROCARDIOGRAM TRACING: CPT | Performed by: SURGERY

## 2020-01-01 PROCEDURE — 94664 DEMO&/EVAL PT USE INHALER: CPT

## 2020-01-01 PROCEDURE — 51798 US URINE CAPACITY MEASURE: CPT

## 2020-01-01 PROCEDURE — 72170 X-RAY EXAM OF PELVIS: CPT

## 2020-01-01 PROCEDURE — 2500000003 HC RX 250 WO HCPCS: Performed by: EMERGENCY MEDICINE

## 2020-01-01 PROCEDURE — 6370000000 HC RX 637 (ALT 250 FOR IP): Performed by: STUDENT IN AN ORGANIZED HEALTH CARE EDUCATION/TRAINING PROGRAM

## 2020-01-01 PROCEDURE — 99285 EMERGENCY DEPT VISIT HI MDM: CPT | Performed by: SURGERY

## 2020-01-01 PROCEDURE — 97165 OT EVAL LOW COMPLEX 30 MIN: CPT

## 2020-01-01 PROCEDURE — 82436 ASSAY OF URINE CHLORIDE: CPT

## 2020-01-01 PROCEDURE — 96368 THER/DIAG CONCURRENT INF: CPT

## 2020-01-01 PROCEDURE — 99232 SBSQ HOSP IP/OBS MODERATE 35: CPT | Performed by: NURSE PRACTITIONER

## 2020-01-01 PROCEDURE — 97110 THERAPEUTIC EXERCISES: CPT

## 2020-01-01 PROCEDURE — 82962 GLUCOSE BLOOD TEST: CPT

## 2020-01-01 PROCEDURE — 97162 PT EVAL MOD COMPLEX 30 MIN: CPT | Performed by: PHYSICAL THERAPIST

## 2020-01-01 PROCEDURE — 82565 ASSAY OF CREATININE: CPT

## 2020-01-01 PROCEDURE — 93880 EXTRACRANIAL BILAT STUDY: CPT

## 2020-01-01 PROCEDURE — 99232 SBSQ HOSP IP/OBS MODERATE 35: CPT | Performed by: SURGERY

## 2020-01-01 PROCEDURE — 96375 TX/PRO/DX INJ NEW DRUG ADDON: CPT

## 2020-01-01 PROCEDURE — 96367 TX/PROPH/DG ADDL SEQ IV INF: CPT

## 2020-01-01 PROCEDURE — 85730 THROMBOPLASTIN TIME PARTIAL: CPT

## 2020-01-01 PROCEDURE — 86850 RBC ANTIBODY SCREEN: CPT

## 2020-01-01 PROCEDURE — 80202 ASSAY OF VANCOMYCIN: CPT

## 2020-01-01 PROCEDURE — 97168 OT RE-EVAL EST PLAN CARE: CPT

## 2020-01-01 PROCEDURE — 96374 THER/PROPH/DIAG INJ IV PUSH: CPT

## 2020-01-01 PROCEDURE — 93005 ELECTROCARDIOGRAM TRACING: CPT | Performed by: STUDENT IN AN ORGANIZED HEALTH CARE EDUCATION/TRAINING PROGRAM

## 2020-01-01 PROCEDURE — 84145 PROCALCITONIN (PCT): CPT

## 2020-01-01 PROCEDURE — 83880 ASSAY OF NATRIURETIC PEPTIDE: CPT

## 2020-01-01 PROCEDURE — 84443 ASSAY THYROID STIM HORMONE: CPT

## 2020-01-01 PROCEDURE — 99215 OFFICE O/P EST HI 40 MIN: CPT | Performed by: INTERNAL MEDICINE

## 2020-01-01 PROCEDURE — 84300 ASSAY OF URINE SODIUM: CPT

## 2020-01-01 PROCEDURE — 99222 1ST HOSP IP/OBS MODERATE 55: CPT | Performed by: NURSE PRACTITIONER

## 2020-01-01 PROCEDURE — 87081 CULTURE SCREEN ONLY: CPT

## 2020-01-01 PROCEDURE — 3700000001 HC ADD 15 MINUTES (ANESTHESIA): Performed by: NEUROLOGICAL SURGERY

## 2020-01-01 PROCEDURE — 94761 N-INVAS EAR/PLS OXIMETRY MLT: CPT

## 2020-01-01 PROCEDURE — 2500000003 HC RX 250 WO HCPCS: Performed by: NEUROLOGICAL SURGERY

## 2020-01-01 PROCEDURE — 99214 OFFICE O/P EST MOD 30 MIN: CPT | Performed by: INTERNAL MEDICINE

## 2020-01-01 PROCEDURE — 83970 ASSAY OF PARATHORMONE: CPT

## 2020-01-01 PROCEDURE — G0480 DRUG TEST DEF 1-7 CLASSES: HCPCS

## 2020-01-01 DEVICE — SCREW, AXS, SELF-TAPPING
Type: IMPLANTABLE DEVICE | Site: CRANIAL | Status: FUNCTIONAL
Brand: UNIVERSAL NEURO 3

## 2020-01-01 DEVICE — BURR HOLE COVER PLATE WITH TAB, 14MM
Type: IMPLANTABLE DEVICE | Site: CRANIAL | Status: FUNCTIONAL
Brand: UNIVERSAL NEURO 2

## 2020-01-01 RX ORDER — LABETALOL HYDROCHLORIDE 5 MG/ML
10 INJECTION, SOLUTION INTRAVENOUS EVERY 4 HOURS PRN
Status: DISCONTINUED | OUTPATIENT
Start: 2020-01-01 | End: 2020-01-01 | Stop reason: HOSPADM

## 2020-01-01 RX ORDER — ACETAMINOPHEN 325 MG/1
650 TABLET ORAL EVERY 4 HOURS PRN
Status: DISCONTINUED | OUTPATIENT
Start: 2020-01-01 | End: 2020-01-01 | Stop reason: HOSPADM

## 2020-01-01 RX ORDER — TAMSULOSIN HYDROCHLORIDE 0.4 MG/1
0.4 CAPSULE ORAL NIGHTLY
COMMUNITY

## 2020-01-01 RX ORDER — SODIUM CHLORIDE 0.9 % (FLUSH) 0.9 %
SYRINGE (ML) INJECTION
Status: COMPLETED
Start: 2020-01-01 | End: 2020-01-01

## 2020-01-01 RX ORDER — LEVETIRACETAM 750 MG/1
750 TABLET ORAL 2 TIMES DAILY
Qty: 60 TABLET | Refills: 3 | Status: SHIPPED | OUTPATIENT
Start: 2020-01-01

## 2020-01-01 RX ORDER — DIAPER,BRIEF,INFANT-TODD,DISP
EACH MISCELLANEOUS PRN
Status: DISCONTINUED | OUTPATIENT
Start: 2020-01-01 | End: 2020-01-01 | Stop reason: ALTCHOICE

## 2020-01-01 RX ORDER — PROMETHAZINE HYDROCHLORIDE 25 MG/ML
6.25 INJECTION, SOLUTION INTRAMUSCULAR; INTRAVENOUS
Status: DISCONTINUED | OUTPATIENT
Start: 2020-01-01 | End: 2020-01-01 | Stop reason: HOSPADM

## 2020-01-01 RX ORDER — TAMSULOSIN HYDROCHLORIDE 0.4 MG/1
0.4 CAPSULE ORAL DAILY
Status: DISCONTINUED | OUTPATIENT
Start: 2020-01-01 | End: 2020-01-01 | Stop reason: HOSPADM

## 2020-01-01 RX ORDER — MEPERIDINE HYDROCHLORIDE 50 MG/ML
12.5 INJECTION INTRAMUSCULAR; INTRAVENOUS; SUBCUTANEOUS EVERY 5 MIN PRN
Status: DISCONTINUED | OUTPATIENT
Start: 2020-01-01 | End: 2020-01-01 | Stop reason: HOSPADM

## 2020-01-01 RX ORDER — GABAPENTIN 100 MG/1
200 CAPSULE ORAL 3 TIMES DAILY
Status: DISCONTINUED | OUTPATIENT
Start: 2020-01-01 | End: 2020-01-01 | Stop reason: HOSPADM

## 2020-01-01 RX ORDER — GABAPENTIN 100 MG/1
200 CAPSULE ORAL 3 TIMES DAILY
COMMUNITY

## 2020-01-01 RX ORDER — SODIUM BICARBONATE 650 MG/1
650 TABLET ORAL 2 TIMES DAILY
Status: DISCONTINUED | OUTPATIENT
Start: 2020-01-01 | End: 2020-01-01 | Stop reason: HOSPADM

## 2020-01-01 RX ORDER — PATIROMER 8.4 G/1
8.4 POWDER, FOR SUSPENSION ORAL DAILY
Qty: 30 PACKET | Refills: 1 | Status: SHIPPED | OUTPATIENT
Start: 2020-01-01 | End: 2020-04-24

## 2020-01-01 RX ORDER — ALBUTEROL SULFATE 2.5 MG/3ML
2.5 SOLUTION RESPIRATORY (INHALATION) EVERY 6 HOURS PRN
Status: DISCONTINUED | OUTPATIENT
Start: 2020-01-01 | End: 2020-01-01 | Stop reason: HOSPADM

## 2020-01-01 RX ORDER — ACETAMINOPHEN 325 MG/1
650 TABLET ORAL EVERY 4 HOURS PRN
Status: ON HOLD | COMMUNITY
End: 2020-01-01 | Stop reason: HOSPADM

## 2020-01-01 RX ORDER — HYDRALAZINE HYDROCHLORIDE 50 MG/1
50 TABLET, FILM COATED ORAL EVERY 12 HOURS SCHEDULED
Status: DISCONTINUED | OUTPATIENT
Start: 2020-01-01 | End: 2020-01-01

## 2020-01-01 RX ORDER — SODIUM CHLORIDE 9 MG/ML
INJECTION, SOLUTION INTRAVENOUS CONTINUOUS
Status: ACTIVE | OUTPATIENT
Start: 2020-01-01 | End: 2020-01-01

## 2020-01-01 RX ORDER — ISOSORBIDE MONONITRATE 60 MG/1
120 TABLET, EXTENDED RELEASE ORAL DAILY
Status: DISCONTINUED | OUTPATIENT
Start: 2020-01-01 | End: 2020-01-01 | Stop reason: HOSPADM

## 2020-01-01 RX ORDER — SODIUM PHOSPHATE, DIBASIC AND SODIUM PHOSPHATE, MONOBASIC 7; 19 G/133ML; G/133ML
1 ENEMA RECTAL
Status: COMPLETED | OUTPATIENT
Start: 2020-01-01 | End: 2020-01-01

## 2020-01-01 RX ORDER — WARFARIN SODIUM 5 MG/1
7.5 TABLET ORAL
Status: DISCONTINUED | OUTPATIENT
Start: 2020-01-01 | End: 2020-01-01 | Stop reason: DRUGHIGH

## 2020-01-01 RX ORDER — ISOSORBIDE MONONITRATE 30 MG/1
30 TABLET, EXTENDED RELEASE ORAL DAILY
Status: DISCONTINUED | OUTPATIENT
Start: 2020-01-01 | End: 2020-01-01 | Stop reason: HOSPADM

## 2020-01-01 RX ORDER — LISINOPRIL 10 MG/1
10 TABLET ORAL DAILY
Status: DISCONTINUED | OUTPATIENT
Start: 2020-01-01 | End: 2020-01-01 | Stop reason: HOSPADM

## 2020-01-01 RX ORDER — GUAIFENESIN 400 MG/1
400 TABLET ORAL 3 TIMES DAILY
Status: DISCONTINUED | OUTPATIENT
Start: 2020-01-01 | End: 2020-01-01 | Stop reason: HOSPADM

## 2020-01-01 RX ORDER — DOCUSATE SODIUM 100 MG/1
100 CAPSULE, LIQUID FILLED ORAL DAILY
Status: DISCONTINUED | OUTPATIENT
Start: 2020-01-01 | End: 2020-01-01 | Stop reason: HOSPADM

## 2020-01-01 RX ORDER — CEFAZOLIN SODIUM 1 G/50ML
1000 SOLUTION INTRAVENOUS EVERY 12 HOURS
Status: DISCONTINUED | OUTPATIENT
Start: 2020-01-01 | End: 2020-01-01

## 2020-01-01 RX ORDER — LEVOFLOXACIN 500 MG/1
500 TABLET, FILM COATED ORAL NIGHTLY
Status: ON HOLD | COMMUNITY
Start: 2020-01-01 | End: 2020-01-01 | Stop reason: HOSPADM

## 2020-01-01 RX ORDER — HYDRALAZINE HYDROCHLORIDE 20 MG/ML
10 INJECTION INTRAMUSCULAR; INTRAVENOUS EVERY 4 HOURS PRN
Status: DISCONTINUED | OUTPATIENT
Start: 2020-01-01 | End: 2020-01-01 | Stop reason: HOSPADM

## 2020-01-01 RX ORDER — FENTANYL CITRATE 50 UG/ML
INJECTION, SOLUTION INTRAMUSCULAR; INTRAVENOUS PRN
Status: DISCONTINUED | OUTPATIENT
Start: 2020-01-01 | End: 2020-01-01 | Stop reason: SDUPTHER

## 2020-01-01 RX ORDER — SODIUM CHLORIDE 0.9 % (FLUSH) 0.9 %
10 SYRINGE (ML) INJECTION EVERY 12 HOURS SCHEDULED
Status: DISCONTINUED | OUTPATIENT
Start: 2020-01-01 | End: 2020-01-01 | Stop reason: HOSPADM

## 2020-01-01 RX ORDER — ISOSORBIDE MONONITRATE 60 MG/1
60 TABLET, EXTENDED RELEASE ORAL DAILY
Status: DISCONTINUED | OUTPATIENT
Start: 2020-01-01 | End: 2020-01-01

## 2020-01-01 RX ORDER — GLYCOPYRROLATE 1 MG/5 ML
SYRINGE (ML) INTRAVENOUS PRN
Status: DISCONTINUED | OUTPATIENT
Start: 2020-01-01 | End: 2020-01-01 | Stop reason: SDUPTHER

## 2020-01-01 RX ORDER — MORPHINE SULFATE 2 MG/ML
2 INJECTION, SOLUTION INTRAMUSCULAR; INTRAVENOUS EVERY 5 MIN PRN
Status: DISCONTINUED | OUTPATIENT
Start: 2020-01-01 | End: 2020-01-01 | Stop reason: HOSPADM

## 2020-01-01 RX ORDER — WARFARIN SODIUM 5 MG/1
10 TABLET ORAL
Status: COMPLETED | OUTPATIENT
Start: 2020-01-01 | End: 2020-01-01

## 2020-01-01 RX ORDER — SODIUM CHLORIDE 0.9 % (FLUSH) 0.9 %
10 SYRINGE (ML) INJECTION PRN
Status: DISCONTINUED | OUTPATIENT
Start: 2020-01-01 | End: 2020-01-01 | Stop reason: HOSPADM

## 2020-01-01 RX ORDER — ONDANSETRON 2 MG/ML
4 INJECTION INTRAMUSCULAR; INTRAVENOUS EVERY 6 HOURS PRN
Status: DISCONTINUED | OUTPATIENT
Start: 2020-01-01 | End: 2020-01-01 | Stop reason: HOSPADM

## 2020-01-01 RX ORDER — MECLIZINE HCL 12.5 MG/1
12.5 TABLET ORAL 3 TIMES DAILY PRN
Status: ON HOLD | COMMUNITY
End: 2020-01-01 | Stop reason: HOSPADM

## 2020-01-01 RX ORDER — 0.9 % SODIUM CHLORIDE 0.9 %
500 INTRAVENOUS SOLUTION INTRAVENOUS ONCE
Status: COMPLETED | OUTPATIENT
Start: 2020-01-01 | End: 2020-01-01

## 2020-01-01 RX ORDER — VITAMIN B COMPLEX
1000 TABLET ORAL 2 TIMES DAILY
Status: DISCONTINUED | OUTPATIENT
Start: 2020-01-01 | End: 2020-01-01 | Stop reason: HOSPADM

## 2020-01-01 RX ORDER — SODIUM CHLORIDE 0.9 % (FLUSH) 0.9 %
10 SYRINGE (ML) INJECTION 2 TIMES DAILY
Status: DISCONTINUED | OUTPATIENT
Start: 2020-01-01 | End: 2020-01-01 | Stop reason: HOSPADM

## 2020-01-01 RX ORDER — SODIUM CHLORIDE 9 MG/ML
INJECTION, SOLUTION INTRAVENOUS CONTINUOUS
Status: DISCONTINUED | OUTPATIENT
Start: 2020-01-01 | End: 2020-01-01 | Stop reason: HOSPADM

## 2020-01-01 RX ORDER — WARFARIN SODIUM 5 MG/1
5 TABLET ORAL
Status: DISCONTINUED | OUTPATIENT
Start: 2020-01-01 | End: 2020-01-01 | Stop reason: HOSPADM

## 2020-01-01 RX ORDER — GLYCOPYRROLATE 0.2 MG/ML
0.2 INJECTION INTRAMUSCULAR; INTRAVENOUS EVERY 4 HOURS PRN
Status: DISCONTINUED | OUTPATIENT
Start: 2020-01-01 | End: 2020-01-01 | Stop reason: HOSPADM

## 2020-01-01 RX ORDER — ATROPINE SULFATE 0.1 MG/ML
INJECTION INTRAVENOUS
Status: DISPENSED
Start: 2020-01-01 | End: 2020-01-01

## 2020-01-01 RX ORDER — M-VIT,TX,IRON,MINS/CALC/FOLIC 27MG-0.4MG
1 TABLET ORAL DAILY
Status: DISCONTINUED | OUTPATIENT
Start: 2020-01-01 | End: 2020-01-01 | Stop reason: HOSPADM

## 2020-01-01 RX ORDER — ACETAMINOPHEN 500 MG
500 TABLET ORAL EVERY 6 HOURS PRN
Status: DISCONTINUED | OUTPATIENT
Start: 2020-01-01 | End: 2020-01-01 | Stop reason: HOSPADM

## 2020-01-01 RX ORDER — LORAZEPAM 2 MG/ML
0.5 INJECTION INTRAMUSCULAR
Status: DISCONTINUED | OUTPATIENT
Start: 2020-01-01 | End: 2020-01-01 | Stop reason: HOSPADM

## 2020-01-01 RX ORDER — LISINOPRIL 5 MG/1
5 TABLET ORAL DAILY
Status: DISCONTINUED | OUTPATIENT
Start: 2020-01-01 | End: 2020-01-01

## 2020-01-01 RX ORDER — GLYCOPYRROLATE 1 MG/1
1 TABLET ORAL EVERY 6 HOURS PRN
Qty: 12 TABLET | Refills: 0 | Status: SHIPPED | OUTPATIENT
Start: 2020-01-01 | End: 2020-01-01

## 2020-01-01 RX ORDER — HYDRALAZINE HYDROCHLORIDE 20 MG/ML
5 INJECTION INTRAMUSCULAR; INTRAVENOUS EVERY 10 MIN PRN
Status: DISCONTINUED | OUTPATIENT
Start: 2020-01-01 | End: 2020-01-01 | Stop reason: HOSPADM

## 2020-01-01 RX ORDER — MORPHINE SULFATE 100 MG/5ML
5 SOLUTION ORAL EVERY 4 HOURS PRN
Qty: 4.5 ML | Refills: 0 | Status: SHIPPED | OUTPATIENT
Start: 2020-01-01 | End: 2020-01-01

## 2020-01-01 RX ORDER — HYDRALAZINE HYDROCHLORIDE 25 MG/1
25 TABLET, FILM COATED ORAL 3 TIMES DAILY
COMMUNITY

## 2020-01-01 RX ORDER — LORAZEPAM 2 MG/ML
1 INJECTION INTRAMUSCULAR
Status: DISCONTINUED | OUTPATIENT
Start: 2020-01-01 | End: 2020-01-01 | Stop reason: HOSPADM

## 2020-01-01 RX ORDER — LABETALOL HYDROCHLORIDE 5 MG/ML
10 INJECTION, SOLUTION INTRAVENOUS EVERY 10 MIN PRN
Status: DISCONTINUED | OUTPATIENT
Start: 2020-01-01 | End: 2020-01-01

## 2020-01-01 RX ORDER — PROPOFOL 10 MG/ML
INJECTION, EMULSION INTRAVENOUS CONTINUOUS PRN
Status: DISCONTINUED | OUTPATIENT
Start: 2020-01-01 | End: 2020-01-01 | Stop reason: SDUPTHER

## 2020-01-01 RX ORDER — LEVETIRACETAM 5 MG/ML
500 INJECTION INTRAVASCULAR EVERY 12 HOURS
Status: DISCONTINUED | OUTPATIENT
Start: 2020-01-01 | End: 2020-01-01

## 2020-01-01 RX ORDER — RAMIPRIL 2.5 MG/1
2.5 CAPSULE ORAL DAILY
Status: DISCONTINUED | OUTPATIENT
Start: 2020-01-01 | End: 2020-01-01

## 2020-01-01 RX ORDER — HYDRALAZINE HYDROCHLORIDE 25 MG/1
25 TABLET, FILM COATED ORAL EVERY 8 HOURS SCHEDULED
Status: DISCONTINUED | OUTPATIENT
Start: 2020-01-01 | End: 2020-01-01 | Stop reason: HOSPADM

## 2020-01-01 RX ORDER — HYDRALAZINE HYDROCHLORIDE 20 MG/ML
10 INJECTION INTRAMUSCULAR; INTRAVENOUS EVERY 10 MIN PRN
Status: DISCONTINUED | OUTPATIENT
Start: 2020-01-01 | End: 2020-01-01

## 2020-01-01 RX ORDER — LISINOPRIL 10 MG/1
10 TABLET ORAL DAILY
Qty: 30 TABLET | Refills: 3 | Status: SHIPPED | OUTPATIENT
Start: 2020-01-01

## 2020-01-01 RX ORDER — ISOSORBIDE MONONITRATE 30 MG/1
60 TABLET, EXTENDED RELEASE ORAL DAILY
Status: DISCONTINUED | OUTPATIENT
Start: 2020-01-01 | End: 2020-01-01 | Stop reason: HOSPADM

## 2020-01-01 RX ORDER — SODIUM CHLORIDE 9 MG/ML
INJECTION, SOLUTION INTRAVENOUS CONTINUOUS PRN
Status: DISCONTINUED | OUTPATIENT
Start: 2020-01-01 | End: 2020-01-01 | Stop reason: SDUPTHER

## 2020-01-01 RX ORDER — LIDOCAINE HYDROCHLORIDE AND EPINEPHRINE 10; 10 MG/ML; UG/ML
INJECTION, SOLUTION INFILTRATION; PERINEURAL PRN
Status: DISCONTINUED | OUTPATIENT
Start: 2020-01-01 | End: 2020-01-01 | Stop reason: ALTCHOICE

## 2020-01-01 RX ORDER — HYDRALAZINE HYDROCHLORIDE 25 MG/1
25 TABLET, FILM COATED ORAL EVERY 8 HOURS SCHEDULED
Qty: 90 TABLET | Refills: 3 | Status: SHIPPED | OUTPATIENT
Start: 2020-01-01 | End: 2020-01-01

## 2020-01-01 RX ORDER — RAMIPRIL 2.5 MG/1
2.5 CAPSULE ORAL DAILY
Status: DISCONTINUED | OUTPATIENT
Start: 2020-01-01 | End: 2020-01-01 | Stop reason: HOSPADM

## 2020-01-01 RX ORDER — LEVETIRACETAM 250 MG/1
750 TABLET ORAL 2 TIMES DAILY
Status: DISCONTINUED | OUTPATIENT
Start: 2020-01-01 | End: 2020-01-01 | Stop reason: HOSPADM

## 2020-01-01 RX ORDER — IPRATROPIUM BROMIDE AND ALBUTEROL SULFATE 2.5; .5 MG/3ML; MG/3ML
1 SOLUTION RESPIRATORY (INHALATION) ONCE
Status: COMPLETED | OUTPATIENT
Start: 2020-01-01 | End: 2020-01-01

## 2020-01-01 RX ORDER — DEXTROSE AND SODIUM CHLORIDE 5; .9 G/100ML; G/100ML
INJECTION, SOLUTION INTRAVENOUS CONTINUOUS
Status: DISCONTINUED | OUTPATIENT
Start: 2020-01-01 | End: 2020-01-01

## 2020-01-01 RX ORDER — HYDRALAZINE HYDROCHLORIDE 20 MG/ML
10 INJECTION INTRAMUSCULAR; INTRAVENOUS EVERY 6 HOURS PRN
Status: DISCONTINUED | OUTPATIENT
Start: 2020-01-01 | End: 2020-01-01 | Stop reason: HOSPADM

## 2020-01-01 RX ORDER — LABETALOL HYDROCHLORIDE 5 MG/ML
5 INJECTION, SOLUTION INTRAVENOUS EVERY 10 MIN PRN
Status: DISCONTINUED | OUTPATIENT
Start: 2020-01-01 | End: 2020-01-01 | Stop reason: HOSPADM

## 2020-01-01 RX ORDER — ISOSORBIDE MONONITRATE 60 MG/1
60 TABLET, EXTENDED RELEASE ORAL 2 TIMES DAILY
Status: DISCONTINUED | OUTPATIENT
Start: 2020-01-01 | End: 2020-01-01

## 2020-01-01 RX ORDER — HYDRALAZINE HYDROCHLORIDE 20 MG/ML
10 INJECTION INTRAMUSCULAR; INTRAVENOUS ONCE
Status: COMPLETED | OUTPATIENT
Start: 2020-01-01 | End: 2020-01-01

## 2020-01-01 RX ORDER — LEVETIRACETAM 100 MG/ML
750 SOLUTION ORAL 2 TIMES DAILY
Status: DISCONTINUED | OUTPATIENT
Start: 2020-01-01 | End: 2020-01-01 | Stop reason: HOSPADM

## 2020-01-01 RX ORDER — ISOSORBIDE MONONITRATE 60 MG/1
60 TABLET, EXTENDED RELEASE ORAL 2 TIMES DAILY
Status: DISCONTINUED | OUTPATIENT
Start: 2020-01-01 | End: 2020-01-01 | Stop reason: HOSPADM

## 2020-01-01 RX ORDER — DOCUSATE SODIUM 100 MG/1
100 CAPSULE, LIQUID FILLED ORAL 2 TIMES DAILY
Status: DISCONTINUED | OUTPATIENT
Start: 2020-01-01 | End: 2020-01-01 | Stop reason: HOSPADM

## 2020-01-01 RX ORDER — SODIUM CHLORIDE 9 MG/ML
INJECTION, SOLUTION INTRAVENOUS CONTINUOUS
Status: DISCONTINUED | OUTPATIENT
Start: 2020-01-01 | End: 2020-01-01

## 2020-01-01 RX ORDER — VITAMIN B COMPLEX
1000 TABLET ORAL DAILY
Status: DISCONTINUED | OUTPATIENT
Start: 2020-01-01 | End: 2020-01-01 | Stop reason: HOSPADM

## 2020-01-01 RX ORDER — EPHEDRINE SULFATE/0.9% NACL/PF 50 MG/5 ML
SYRINGE (ML) INTRAVENOUS PRN
Status: DISCONTINUED | OUTPATIENT
Start: 2020-01-01 | End: 2020-01-01 | Stop reason: SDUPTHER

## 2020-01-01 RX ORDER — IPRATROPIUM BROMIDE AND ALBUTEROL SULFATE 2.5; .5 MG/3ML; MG/3ML
3 SOLUTION RESPIRATORY (INHALATION) ONCE
Status: DISCONTINUED | OUTPATIENT
Start: 2020-01-01 | End: 2020-01-01

## 2020-01-01 RX ORDER — WARFARIN SODIUM 5 MG/1
7.5 TABLET ORAL DAILY
Status: DISCONTINUED | OUTPATIENT
Start: 2020-01-01 | End: 2020-01-01 | Stop reason: DRUGHIGH

## 2020-01-01 RX ORDER — HYDROCODONE BITARTRATE AND ACETAMINOPHEN 5; 325 MG/1; MG/1
1 TABLET ORAL EVERY 6 HOURS PRN
Status: DISCONTINUED | OUTPATIENT
Start: 2020-01-01 | End: 2020-01-01 | Stop reason: HOSPADM

## 2020-01-01 RX ADMIN — GABAPENTIN 200 MG: 100 CAPSULE ORAL at 15:15

## 2020-01-01 RX ADMIN — RAMIPRIL 2.5 MG: 2.5 CAPSULE ORAL at 09:18

## 2020-01-01 RX ADMIN — VITAMIN D, TAB 1000IU (100/BT) 1000 UNITS: 25 TAB at 09:20

## 2020-01-01 RX ADMIN — SODIUM CHLORIDE, PRESERVATIVE FREE 10 ML: 5 INJECTION INTRAVENOUS at 20:29

## 2020-01-01 RX ADMIN — GABAPENTIN 200 MG: 100 CAPSULE ORAL at 22:41

## 2020-01-01 RX ADMIN — LEVETIRACETAM 750 MG: 250 TABLET, FILM COATED ORAL at 13:35

## 2020-01-01 RX ADMIN — DOCUSATE SODIUM 100 MG: 100 CAPSULE, LIQUID FILLED ORAL at 09:18

## 2020-01-01 RX ADMIN — Medication 10 ML: at 08:21

## 2020-01-01 RX ADMIN — GABAPENTIN 200 MG: 100 CAPSULE ORAL at 09:14

## 2020-01-01 RX ADMIN — SODIUM BICARBONATE 650 MG: 650 TABLET ORAL at 09:24

## 2020-01-01 RX ADMIN — SODIUM BICARBONATE 650 MG: 650 TABLET ORAL at 08:59

## 2020-01-01 RX ADMIN — HYDRALAZINE HYDROCHLORIDE 10 MG: 20 INJECTION INTRAMUSCULAR; INTRAVENOUS at 17:07

## 2020-01-01 RX ADMIN — LISINOPRIL 5 MG: 5 TABLET ORAL at 09:12

## 2020-01-01 RX ADMIN — LISINOPRIL 5 MG: 5 TABLET ORAL at 10:09

## 2020-01-01 RX ADMIN — LISINOPRIL 10 MG: 10 TABLET ORAL at 10:06

## 2020-01-01 RX ADMIN — DEXTROSE AND SODIUM CHLORIDE: 5; 900 INJECTION, SOLUTION INTRAVENOUS at 15:31

## 2020-01-01 RX ADMIN — GABAPENTIN 200 MG: 100 CAPSULE ORAL at 08:22

## 2020-01-01 RX ADMIN — GABAPENTIN 200 MG: 100 CAPSULE ORAL at 20:07

## 2020-01-01 RX ADMIN — SODIUM BICARBONATE 650 MG: 650 TABLET ORAL at 21:29

## 2020-01-01 RX ADMIN — LEVETIRACETAM 750 MG: 500 SOLUTION ORAL at 20:16

## 2020-01-01 RX ADMIN — SODIUM PHOSPHATE, DIBASIC AND SODIUM PHOSPHATE, MONOBASIC 1 ENEMA: 7; 19 ENEMA RECTAL at 09:32

## 2020-01-01 RX ADMIN — TAMSULOSIN HYDROCHLORIDE 0.4 MG: 0.4 CAPSULE ORAL at 09:18

## 2020-01-01 RX ADMIN — ISOSORBIDE MONONITRATE 60 MG: 60 TABLET ORAL at 09:15

## 2020-01-01 RX ADMIN — PHYTONADIONE 10 MG: 10 INJECTION, EMULSION INTRAMUSCULAR; INTRAVENOUS; SUBCUTANEOUS at 20:13

## 2020-01-01 RX ADMIN — LISINOPRIL 10 MG: 10 TABLET ORAL at 08:21

## 2020-01-01 RX ADMIN — ONDANSETRON 4 MG: 2 INJECTION INTRAMUSCULAR; INTRAVENOUS at 07:51

## 2020-01-01 RX ADMIN — GABAPENTIN 200 MG: 100 CAPSULE ORAL at 14:54

## 2020-01-01 RX ADMIN — LISINOPRIL 5 MG: 5 TABLET ORAL at 09:15

## 2020-01-01 RX ADMIN — SODIUM BICARBONATE 650 MG: 650 TABLET ORAL at 09:15

## 2020-01-01 RX ADMIN — LEVETIRACETAM 750 MG: 250 TABLET, FILM COATED ORAL at 20:35

## 2020-01-01 RX ADMIN — VITAMIN D, TAB 1000IU (100/BT) 1000 UNITS: 25 TAB at 09:31

## 2020-01-01 RX ADMIN — FENTANYL CITRATE 25 MCG: 50 INJECTION, SOLUTION INTRAMUSCULAR; INTRAVENOUS at 10:30

## 2020-01-01 RX ADMIN — DOCUSATE SODIUM 100 MG: 100 CAPSULE, LIQUID FILLED ORAL at 21:05

## 2020-01-01 RX ADMIN — SODIUM BICARBONATE 650 MG: 650 TABLET ORAL at 09:18

## 2020-01-01 RX ADMIN — SODIUM CHLORIDE: 9 INJECTION, SOLUTION INTRAVENOUS at 09:59

## 2020-01-01 RX ADMIN — LEVETIRACETAM 500 MG: 5 INJECTION INTRAVENOUS at 09:13

## 2020-01-01 RX ADMIN — HYDRALAZINE HYDROCHLORIDE 25 MG: 25 TABLET, FILM COATED ORAL at 06:13

## 2020-01-01 RX ADMIN — DOCUSATE SODIUM 100 MG: 100 CAPSULE, LIQUID FILLED ORAL at 20:19

## 2020-01-01 RX ADMIN — LEVETIRACETAM 750 MG: 250 TABLET, FILM COATED ORAL at 20:11

## 2020-01-01 RX ADMIN — GABAPENTIN 200 MG: 100 CAPSULE ORAL at 13:59

## 2020-01-01 RX ADMIN — DOCUSATE SODIUM 100 MG: 100 CAPSULE, LIQUID FILLED ORAL at 09:30

## 2020-01-01 RX ADMIN — ISOSORBIDE MONONITRATE 60 MG: 30 TABLET ORAL at 17:52

## 2020-01-01 RX ADMIN — GUAIFENESIN 400 MG: 400 TABLET, FILM COATED ORAL at 07:57

## 2020-01-01 RX ADMIN — LEVETIRACETAM 750 MG: 250 TABLET, FILM COATED ORAL at 21:03

## 2020-01-01 RX ADMIN — DEXTROSE 500 MG: 50 INJECTION, SOLUTION INTRAVENOUS at 10:14

## 2020-01-01 RX ADMIN — ISOSORBIDE MONONITRATE 60 MG: 60 TABLET ORAL at 08:59

## 2020-01-01 RX ADMIN — GABAPENTIN 200 MG: 100 CAPSULE ORAL at 10:05

## 2020-01-01 RX ADMIN — FENTANYL CITRATE 25 MCG: 50 INJECTION, SOLUTION INTRAMUSCULAR; INTRAVENOUS at 10:04

## 2020-01-01 RX ADMIN — GABAPENTIN 200 MG: 100 CAPSULE ORAL at 13:55

## 2020-01-01 RX ADMIN — SODIUM CHLORIDE 2 MG/HR: 9 INJECTION, SOLUTION INTRAVENOUS at 08:05

## 2020-01-01 RX ADMIN — PROTHROMBIN, COAGULATION FACTOR VII HUMAN, COAGULATION FACTOR IX HUMAN, COAGULATION FACTOR X HUMAN, PROTEIN C, PROTEIN S HUMAN, AND WATER 1500 UNITS: KIT at 19:47

## 2020-01-01 RX ADMIN — RAMIPRIL 2.5 MG: 2.5 CAPSULE ORAL at 13:54

## 2020-01-01 RX ADMIN — GABAPENTIN 200 MG: 100 CAPSULE ORAL at 21:35

## 2020-01-01 RX ADMIN — ISOSORBIDE MONONITRATE 60 MG: 30 TABLET ORAL at 12:18

## 2020-01-01 RX ADMIN — GABAPENTIN 200 MG: 100 CAPSULE ORAL at 20:17

## 2020-01-01 RX ADMIN — HYDRALAZINE HYDROCHLORIDE 25 MG: 25 TABLET, FILM COATED ORAL at 22:05

## 2020-01-01 RX ADMIN — SODIUM CHLORIDE 2.5 MG/HR: 9 INJECTION, SOLUTION INTRAVENOUS at 21:52

## 2020-01-01 RX ADMIN — HYDRALAZINE HYDROCHLORIDE 10 MG: 20 INJECTION INTRAMUSCULAR; INTRAVENOUS at 12:02

## 2020-01-01 RX ADMIN — LISINOPRIL 10 MG: 10 TABLET ORAL at 08:24

## 2020-01-01 RX ADMIN — ISOSORBIDE MONONITRATE 60 MG: 60 TABLET ORAL at 09:20

## 2020-01-01 RX ADMIN — SODIUM CHLORIDE, PRESERVATIVE FREE 10 ML: 5 INJECTION INTRAVENOUS at 08:59

## 2020-01-01 RX ADMIN — HYDRALAZINE HYDROCHLORIDE 10 MG: 20 INJECTION INTRAMUSCULAR; INTRAVENOUS at 09:44

## 2020-01-01 RX ADMIN — GABAPENTIN 200 MG: 100 CAPSULE ORAL at 09:30

## 2020-01-01 RX ADMIN — GABAPENTIN 200 MG: 100 CAPSULE ORAL at 07:58

## 2020-01-01 RX ADMIN — RAMIPRIL 2.5 MG: 2.5 CAPSULE ORAL at 09:20

## 2020-01-01 RX ADMIN — LEVETIRACETAM 750 MG: 250 TABLET, FILM COATED ORAL at 10:00

## 2020-01-01 RX ADMIN — LABETALOL HYDROCHLORIDE 10 MG: 5 INJECTION INTRAVENOUS at 16:16

## 2020-01-01 RX ADMIN — TAMSULOSIN HYDROCHLORIDE 0.4 MG: 0.4 CAPSULE ORAL at 10:14

## 2020-01-01 RX ADMIN — CEFEPIME HYDROCHLORIDE 1 G: 1 INJECTION, POWDER, FOR SOLUTION INTRAMUSCULAR; INTRAVENOUS at 15:20

## 2020-01-01 RX ADMIN — GUAIFENESIN 400 MG: 400 TABLET, FILM COATED ORAL at 20:32

## 2020-01-01 RX ADMIN — SODIUM CHLORIDE: 9 INJECTION, SOLUTION INTRAVENOUS at 13:31

## 2020-01-01 RX ADMIN — PATIROMER 8.4 G: 8.4 POWDER, FOR SUSPENSION ORAL at 16:27

## 2020-01-01 RX ADMIN — ACETAMINOPHEN 500 MG: 500 TABLET ORAL at 14:43

## 2020-01-01 RX ADMIN — ISOSORBIDE MONONITRATE 60 MG: 60 TABLET ORAL at 16:27

## 2020-01-01 RX ADMIN — GABAPENTIN 200 MG: 100 CAPSULE ORAL at 09:12

## 2020-01-01 RX ADMIN — ISOSORBIDE MONONITRATE 30 MG: 30 TABLET ORAL at 12:02

## 2020-01-01 RX ADMIN — CEFEPIME HYDROCHLORIDE 1 G: 1 INJECTION, POWDER, FOR SOLUTION INTRAMUSCULAR; INTRAVENOUS at 14:30

## 2020-01-01 RX ADMIN — IPRATROPIUM BROMIDE AND ALBUTEROL SULFATE 1 AMPULE: .5; 3 SOLUTION RESPIRATORY (INHALATION) at 15:14

## 2020-01-01 RX ADMIN — SODIUM BICARBONATE 650 MG: 650 TABLET ORAL at 20:19

## 2020-01-01 RX ADMIN — SODIUM CHLORIDE: 9 INJECTION, SOLUTION INTRAVENOUS at 20:29

## 2020-01-01 RX ADMIN — LEVETIRACETAM 750 MG: 500 SOLUTION ORAL at 10:06

## 2020-01-01 RX ADMIN — SODIUM BICARBONATE 650 MG: 650 TABLET ORAL at 09:20

## 2020-01-01 RX ADMIN — PATIROMER 8.4 G: 8.4 POWDER, FOR SUSPENSION ORAL at 15:20

## 2020-01-01 RX ADMIN — SODIUM BICARBONATE 650 MG: 650 TABLET ORAL at 20:32

## 2020-01-01 RX ADMIN — GABAPENTIN 200 MG: 100 CAPSULE ORAL at 20:25

## 2020-01-01 RX ADMIN — LEVETIRACETAM 750 MG: 500 SOLUTION ORAL at 08:59

## 2020-01-01 RX ADMIN — SODIUM BICARBONATE 650 MG: 650 TABLET ORAL at 20:17

## 2020-01-01 RX ADMIN — SODIUM BICARBONATE 650 MG: 650 TABLET ORAL at 20:28

## 2020-01-01 RX ADMIN — TAMSULOSIN HYDROCHLORIDE 0.4 MG: 0.4 CAPSULE ORAL at 09:59

## 2020-01-01 RX ADMIN — LEVETIRACETAM 750 MG: 250 TABLET, FILM COATED ORAL at 07:57

## 2020-01-01 RX ADMIN — VITAMIN D, TAB 1000IU (100/BT) 1000 UNITS: 25 TAB at 08:59

## 2020-01-01 RX ADMIN — ALBUTEROL SULFATE 2.5 MG: 2.5 SOLUTION RESPIRATORY (INHALATION) at 20:37

## 2020-01-01 RX ADMIN — SODIUM BICARBONATE 650 MG: 650 TABLET ORAL at 09:11

## 2020-01-01 RX ADMIN — LEVETIRACETAM 750 MG: 250 TABLET, FILM COATED ORAL at 08:24

## 2020-01-01 RX ADMIN — HYDRALAZINE HYDROCHLORIDE 25 MG: 25 TABLET, FILM COATED ORAL at 20:11

## 2020-01-01 RX ADMIN — DOCUSATE SODIUM 100 MG: 100 CAPSULE, LIQUID FILLED ORAL at 09:31

## 2020-01-01 RX ADMIN — WARFARIN SODIUM 10 MG: 5 TABLET ORAL at 18:04

## 2020-01-01 RX ADMIN — VANCOMYCIN HYDROCHLORIDE 1000 MG: 1 INJECTION, POWDER, LYOPHILIZED, FOR SOLUTION INTRAVENOUS at 14:40

## 2020-01-01 RX ADMIN — GABAPENTIN 200 MG: 100 CAPSULE ORAL at 13:50

## 2020-01-01 RX ADMIN — Medication 0.2 MG: at 10:12

## 2020-01-01 RX ADMIN — ISOSORBIDE MONONITRATE 60 MG: 60 TABLET ORAL at 23:16

## 2020-01-01 RX ADMIN — DOCUSATE SODIUM 100 MG: 100 CAPSULE, LIQUID FILLED ORAL at 09:25

## 2020-01-01 RX ADMIN — TAMSULOSIN HYDROCHLORIDE 0.4 MG: 0.4 CAPSULE ORAL at 09:40

## 2020-01-01 RX ADMIN — GABAPENTIN 200 MG: 100 CAPSULE ORAL at 09:15

## 2020-01-01 RX ADMIN — DOCUSATE SODIUM 100 MG: 100 CAPSULE, LIQUID FILLED ORAL at 13:49

## 2020-01-01 RX ADMIN — CEFAZOLIN SODIUM 1000 MG: 1 SOLUTION INTRAVENOUS at 21:30

## 2020-01-01 RX ADMIN — TAMSULOSIN HYDROCHLORIDE 0.4 MG: 0.4 CAPSULE ORAL at 09:12

## 2020-01-01 RX ADMIN — GABAPENTIN 200 MG: 100 CAPSULE ORAL at 20:32

## 2020-01-01 RX ADMIN — GABAPENTIN 200 MG: 100 CAPSULE ORAL at 21:29

## 2020-01-01 RX ADMIN — ISOSORBIDE MONONITRATE 60 MG: 60 TABLET ORAL at 20:18

## 2020-01-01 RX ADMIN — HYDRALAZINE HYDROCHLORIDE 50 MG: 50 TABLET, FILM COATED ORAL at 20:28

## 2020-01-01 RX ADMIN — SODIUM CHLORIDE: 9 INJECTION, SOLUTION INTRAVENOUS at 17:53

## 2020-01-01 RX ADMIN — SODIUM CHLORIDE: 9 INJECTION, SOLUTION INTRAVENOUS at 14:33

## 2020-01-01 RX ADMIN — ISOSORBIDE MONONITRATE 60 MG: 30 TABLET ORAL at 09:58

## 2020-01-01 RX ADMIN — ISOSORBIDE MONONITRATE 30 MG: 30 TABLET ORAL at 09:31

## 2020-01-01 RX ADMIN — HYDRALAZINE HYDROCHLORIDE 25 MG: 25 TABLET, FILM COATED ORAL at 09:58

## 2020-01-01 RX ADMIN — SODIUM BICARBONATE 650 MG: 650 TABLET ORAL at 09:39

## 2020-01-01 RX ADMIN — ISOSORBIDE MONONITRATE 30 MG: 30 TABLET ORAL at 09:40

## 2020-01-01 RX ADMIN — SODIUM CHLORIDE 5 MG/HR: 9 INJECTION, SOLUTION INTRAVENOUS at 20:20

## 2020-01-01 RX ADMIN — Medication 10 MG: at 10:35

## 2020-01-01 RX ADMIN — TAMSULOSIN HYDROCHLORIDE 0.4 MG: 0.4 CAPSULE ORAL at 09:17

## 2020-01-01 RX ADMIN — HYDRALAZINE HYDROCHLORIDE 25 MG: 25 TABLET, FILM COATED ORAL at 14:29

## 2020-01-01 RX ADMIN — GABAPENTIN 200 MG: 100 CAPSULE ORAL at 13:29

## 2020-01-01 RX ADMIN — SODIUM BICARBONATE 650 MG: 650 TABLET ORAL at 08:23

## 2020-01-01 RX ADMIN — PATIROMER 8.4 G: 8.4 POWDER, FOR SUSPENSION ORAL at 09:43

## 2020-01-01 RX ADMIN — DOCUSATE SODIUM 100 MG: 100 CAPSULE, LIQUID FILLED ORAL at 09:15

## 2020-01-01 RX ADMIN — TAMSULOSIN HYDROCHLORIDE 0.4 MG: 0.4 CAPSULE ORAL at 07:58

## 2020-01-01 RX ADMIN — TAMSULOSIN HYDROCHLORIDE 0.4 MG: 0.4 CAPSULE ORAL at 08:59

## 2020-01-01 RX ADMIN — LEVETIRACETAM 750 MG: 250 TABLET, FILM COATED ORAL at 09:12

## 2020-01-01 RX ADMIN — MULTIPLE VITAMINS W/ MINERALS TAB 1 TABLET: TAB at 09:31

## 2020-01-01 RX ADMIN — SODIUM BICARBONATE 650 MG: 650 TABLET ORAL at 08:22

## 2020-01-01 RX ADMIN — SODIUM BICARBONATE 650 MG: 650 TABLET ORAL at 09:31

## 2020-01-01 RX ADMIN — HYDRALAZINE HYDROCHLORIDE 10 MG: 20 INJECTION INTRAMUSCULAR; INTRAVENOUS at 12:29

## 2020-01-01 RX ADMIN — SODIUM BICARBONATE 650 MG: 650 TABLET ORAL at 20:07

## 2020-01-01 RX ADMIN — LEVETIRACETAM 750 MG: 250 TABLET, FILM COATED ORAL at 08:22

## 2020-01-01 RX ADMIN — VITAMIN D, TAB 1000IU (100/BT) 1000 UNITS: 25 TAB at 09:18

## 2020-01-01 RX ADMIN — GABAPENTIN 200 MG: 100 CAPSULE ORAL at 14:37

## 2020-01-01 RX ADMIN — VITAMIN D, TAB 1000IU (100/BT) 1000 UNITS: 25 TAB at 09:40

## 2020-01-01 RX ADMIN — WARFARIN SODIUM 7.5 MG: 5 TABLET ORAL at 17:53

## 2020-01-01 RX ADMIN — LISINOPRIL 5 MG: 5 TABLET ORAL at 09:30

## 2020-01-01 RX ADMIN — SODIUM CHLORIDE, PRESERVATIVE FREE 10 ML: 5 INJECTION INTRAVENOUS at 09:31

## 2020-01-01 RX ADMIN — SODIUM CHLORIDE 500 ML: 9 INJECTION, SOLUTION INTRAVENOUS at 15:07

## 2020-01-01 RX ADMIN — SODIUM CHLORIDE, PRESERVATIVE FREE 10 ML: 5 INJECTION INTRAVENOUS at 20:17

## 2020-01-01 RX ADMIN — SODIUM BICARBONATE 650 MG: 650 TABLET ORAL at 09:17

## 2020-01-01 RX ADMIN — DEXTROSE AND SODIUM CHLORIDE: 5; 900 INJECTION, SOLUTION INTRAVENOUS at 21:50

## 2020-01-01 RX ADMIN — CEFAZOLIN SODIUM 1000 MG: 1 SOLUTION INTRAVENOUS at 22:49

## 2020-01-01 RX ADMIN — SODIUM BICARBONATE 650 MG: 650 TABLET ORAL at 10:06

## 2020-01-01 RX ADMIN — CEFTRIAXONE 2 G: 2 INJECTION, POWDER, FOR SOLUTION INTRAMUSCULAR; INTRAVENOUS at 16:05

## 2020-01-01 RX ADMIN — LEVETIRACETAM 750 MG: 250 TABLET, FILM COATED ORAL at 22:29

## 2020-01-01 RX ADMIN — DOCUSATE SODIUM 100 MG: 100 CAPSULE, LIQUID FILLED ORAL at 20:24

## 2020-01-01 RX ADMIN — SODIUM CHLORIDE, PRESERVATIVE FREE 10 ML: 5 INJECTION INTRAVENOUS at 20:18

## 2020-01-01 RX ADMIN — DOCUSATE SODIUM 100 MG: 100 CAPSULE, LIQUID FILLED ORAL at 10:14

## 2020-01-01 RX ADMIN — LEVETIRACETAM 750 MG: 250 TABLET, FILM COATED ORAL at 09:30

## 2020-01-01 RX ADMIN — GABAPENTIN 200 MG: 100 CAPSULE ORAL at 09:20

## 2020-01-01 RX ADMIN — HYDRALAZINE HYDROCHLORIDE 50 MG: 50 TABLET, FILM COATED ORAL at 09:20

## 2020-01-01 RX ADMIN — GUAIFENESIN 400 MG: 400 TABLET, FILM COATED ORAL at 20:12

## 2020-01-01 RX ADMIN — LEVETIRACETAM 750 MG: 250 TABLET, FILM COATED ORAL at 09:47

## 2020-01-01 RX ADMIN — CEFAZOLIN SODIUM 1000 MG: 1 SOLUTION INTRAVENOUS at 10:33

## 2020-01-01 RX ADMIN — GABAPENTIN 200 MG: 100 CAPSULE ORAL at 14:32

## 2020-01-01 RX ADMIN — LEVETIRACETAM 750 MG: 250 TABLET, FILM COATED ORAL at 21:36

## 2020-01-01 RX ADMIN — SODIUM BICARBONATE 650 MG: 650 TABLET ORAL at 07:57

## 2020-01-01 RX ADMIN — GABAPENTIN 200 MG: 100 CAPSULE ORAL at 09:13

## 2020-01-01 RX ADMIN — SODIUM BICARBONATE 650 MG: 650 TABLET ORAL at 20:12

## 2020-01-01 RX ADMIN — ISOSORBIDE MONONITRATE 60 MG: 60 TABLET ORAL at 20:06

## 2020-01-01 RX ADMIN — ISOSORBIDE MONONITRATE 60 MG: 60 TABLET ORAL at 09:30

## 2020-01-01 RX ADMIN — GABAPENTIN 200 MG: 100 CAPSULE ORAL at 20:35

## 2020-01-01 RX ADMIN — LISINOPRIL 5 MG: 5 TABLET ORAL at 09:13

## 2020-01-01 RX ADMIN — GABAPENTIN 200 MG: 100 CAPSULE ORAL at 21:31

## 2020-01-01 RX ADMIN — LEVETIRACETAM 750 MG: 250 TABLET, FILM COATED ORAL at 20:06

## 2020-01-01 RX ADMIN — GABAPENTIN 200 MG: 100 CAPSULE ORAL at 20:28

## 2020-01-01 RX ADMIN — LEVETIRACETAM 750 MG: 250 TABLET, FILM COATED ORAL at 09:09

## 2020-01-01 RX ADMIN — LISINOPRIL 10 MG: 10 TABLET ORAL at 08:02

## 2020-01-01 RX ADMIN — SODIUM CHLORIDE, PRESERVATIVE FREE 10 ML: 5 INJECTION INTRAVENOUS at 10:06

## 2020-01-01 RX ADMIN — GUAIFENESIN 400 MG: 400 TABLET, FILM COATED ORAL at 14:41

## 2020-01-01 RX ADMIN — TAMSULOSIN HYDROCHLORIDE 0.4 MG: 0.4 CAPSULE ORAL at 09:21

## 2020-01-01 RX ADMIN — Medication 10 ML: at 20:36

## 2020-01-01 RX ADMIN — GABAPENTIN 200 MG: 100 CAPSULE ORAL at 09:40

## 2020-01-01 RX ADMIN — WARFARIN SODIUM 7.5 MG: 5 TABLET ORAL at 17:41

## 2020-01-01 RX ADMIN — SODIUM BICARBONATE 650 MG: 650 TABLET ORAL at 21:03

## 2020-01-01 RX ADMIN — ACETAMINOPHEN 650 MG: 325 TABLET, FILM COATED ORAL at 15:34

## 2020-01-01 RX ADMIN — GABAPENTIN 200 MG: 100 CAPSULE ORAL at 21:03

## 2020-01-01 RX ADMIN — GABAPENTIN 200 MG: 100 CAPSULE ORAL at 20:19

## 2020-01-01 RX ADMIN — TAMSULOSIN HYDROCHLORIDE 0.4 MG: 0.4 CAPSULE ORAL at 10:11

## 2020-01-01 RX ADMIN — SODIUM BICARBONATE 650 MG: 650 TABLET ORAL at 10:00

## 2020-01-01 RX ADMIN — SODIUM CHLORIDE: 9 INJECTION, SOLUTION INTRAVENOUS at 14:30

## 2020-01-01 RX ADMIN — GUAIFENESIN 400 MG: 400 TABLET, FILM COATED ORAL at 09:59

## 2020-01-01 RX ADMIN — GABAPENTIN 200 MG: 100 CAPSULE ORAL at 09:18

## 2020-01-01 RX ADMIN — ISOSORBIDE MONONITRATE 120 MG: 60 TABLET ORAL at 08:23

## 2020-01-01 RX ADMIN — GABAPENTIN 200 MG: 100 CAPSULE ORAL at 08:59

## 2020-01-01 RX ADMIN — HYDRALAZINE HYDROCHLORIDE 10 MG: 20 INJECTION INTRAMUSCULAR; INTRAVENOUS at 19:02

## 2020-01-01 RX ADMIN — DOCUSATE SODIUM 100 MG: 100 CAPSULE, LIQUID FILLED ORAL at 09:40

## 2020-01-01 RX ADMIN — GABAPENTIN 200 MG: 100 CAPSULE ORAL at 08:23

## 2020-01-01 RX ADMIN — CEFEPIME HYDROCHLORIDE 1 G: 1 INJECTION, POWDER, FOR SOLUTION INTRAMUSCULAR; INTRAVENOUS at 15:54

## 2020-01-01 RX ADMIN — DOCUSATE SODIUM 100 MG: 100 CAPSULE, LIQUID FILLED ORAL at 09:17

## 2020-01-01 RX ADMIN — TAMSULOSIN HYDROCHLORIDE 0.4 MG: 0.4 CAPSULE ORAL at 08:22

## 2020-01-01 RX ADMIN — LABETALOL HYDROCHLORIDE 10 MG: 5 INJECTION INTRAVENOUS at 09:05

## 2020-01-01 RX ADMIN — GABAPENTIN 200 MG: 100 CAPSULE ORAL at 13:36

## 2020-01-01 RX ADMIN — SODIUM BICARBONATE 650 MG: 650 TABLET ORAL at 22:41

## 2020-01-01 RX ADMIN — Medication 10 ML: at 12:07

## 2020-01-01 RX ADMIN — TAMSULOSIN HYDROCHLORIDE 0.4 MG: 0.4 CAPSULE ORAL at 08:23

## 2020-01-01 RX ADMIN — GABAPENTIN 200 MG: 100 CAPSULE ORAL at 14:43

## 2020-01-01 RX ADMIN — LEVETIRACETAM 750 MG: 250 TABLET, FILM COATED ORAL at 22:41

## 2020-01-01 RX ADMIN — ACETAMINOPHEN 650 MG: 325 TABLET, FILM COATED ORAL at 02:59

## 2020-01-01 RX ADMIN — HYDRALAZINE HYDROCHLORIDE 25 MG: 25 TABLET, FILM COATED ORAL at 05:44

## 2020-01-01 RX ADMIN — SODIUM BICARBONATE 650 MG: 650 TABLET ORAL at 21:36

## 2020-01-01 RX ADMIN — RAMIPRIL 2.5 MG: 2.5 CAPSULE ORAL at 09:31

## 2020-01-01 RX ADMIN — SODIUM CHLORIDE, PRESERVATIVE FREE 10 ML: 5 INJECTION INTRAVENOUS at 09:16

## 2020-01-01 RX ADMIN — SODIUM CHLORIDE: 9 INJECTION, SOLUTION INTRAVENOUS at 08:04

## 2020-01-01 RX ADMIN — LABETALOL HYDROCHLORIDE 10 MG: 5 INJECTION INTRAVENOUS at 14:58

## 2020-01-01 RX ADMIN — SODIUM BICARBONATE 650 MG: 650 TABLET ORAL at 21:32

## 2020-01-01 RX ADMIN — DOCUSATE SODIUM 100 MG: 100 CAPSULE, LIQUID FILLED ORAL at 09:12

## 2020-01-01 RX ADMIN — SODIUM BICARBONATE 650 MG: 650 TABLET ORAL at 09:30

## 2020-01-01 RX ADMIN — SODIUM BICARBONATE 650 MG: 650 TABLET ORAL at 20:35

## 2020-01-01 RX ADMIN — TAMSULOSIN HYDROCHLORIDE 0.4 MG: 0.4 CAPSULE ORAL at 09:15

## 2020-01-01 RX ADMIN — GABAPENTIN 200 MG: 100 CAPSULE ORAL at 14:02

## 2020-01-01 RX ADMIN — GABAPENTIN 200 MG: 100 CAPSULE ORAL at 09:31

## 2020-01-01 RX ADMIN — PATIROMER 8.4 G: 8.4 POWDER, FOR SUSPENSION ORAL at 09:28

## 2020-01-01 RX ADMIN — SODIUM BICARBONATE 650 MG: 650 TABLET ORAL at 21:05

## 2020-01-01 RX ADMIN — HYDRALAZINE HYDROCHLORIDE 50 MG: 50 TABLET, FILM COATED ORAL at 21:36

## 2020-01-01 RX ADMIN — LISINOPRIL 10 MG: 10 TABLET ORAL at 09:17

## 2020-01-01 RX ADMIN — ISOSORBIDE MONONITRATE 60 MG: 60 TABLET ORAL at 20:16

## 2020-01-01 RX ADMIN — TAMSULOSIN HYDROCHLORIDE 0.4 MG: 0.4 CAPSULE ORAL at 09:13

## 2020-01-01 RX ADMIN — VITAMIN D, TAB 1000IU (100/BT) 1000 UNITS: 25 TAB at 20:17

## 2020-01-01 RX ADMIN — GABAPENTIN 200 MG: 100 CAPSULE ORAL at 20:12

## 2020-01-01 RX ADMIN — LEVETIRACETAM 750 MG: 250 TABLET, FILM COATED ORAL at 09:15

## 2020-01-01 RX ADMIN — TAMSULOSIN HYDROCHLORIDE 0.4 MG: 0.4 CAPSULE ORAL at 17:41

## 2020-01-01 RX ADMIN — Medication 10 ML: at 20:12

## 2020-01-01 RX ADMIN — PATIROMER 8.4 G: 8.4 POWDER, FOR SUSPENSION ORAL at 09:12

## 2020-01-01 RX ADMIN — CEFAZOLIN SODIUM 1000 MG: 1 SOLUTION INTRAVENOUS at 22:28

## 2020-01-01 RX ADMIN — HYDRALAZINE HYDROCHLORIDE 25 MG: 25 TABLET, FILM COATED ORAL at 15:33

## 2020-01-01 RX ADMIN — LEVETIRACETAM 750 MG: 500 SOLUTION ORAL at 20:18

## 2020-01-01 RX ADMIN — GUAIFENESIN 400 MG: 400 TABLET, FILM COATED ORAL at 15:16

## 2020-01-01 RX ADMIN — Medication 10 ML: at 20:06

## 2020-01-01 RX ADMIN — ISOSORBIDE MONONITRATE 60 MG: 30 TABLET ORAL at 07:57

## 2020-01-01 RX ADMIN — MULTIPLE VITAMINS W/ MINERALS TAB 1 TABLET: TAB at 09:18

## 2020-01-01 RX ADMIN — LEVETIRACETAM 750 MG: 250 TABLET, FILM COATED ORAL at 21:29

## 2020-01-01 RX ADMIN — PROPOFOL 50 MCG/KG/MIN: 10 INJECTION, EMULSION INTRAVENOUS at 10:04

## 2020-01-01 RX ADMIN — Medication 10 ML: at 08:14

## 2020-01-01 RX ADMIN — VITAMIN D, TAB 1000IU (100/BT) 1000 UNITS: 25 TAB at 09:24

## 2020-01-01 RX ADMIN — ISOSORBIDE MONONITRATE 60 MG: 60 TABLET ORAL at 10:06

## 2020-01-01 RX ADMIN — ACETAMINOPHEN 650 MG: 325 TABLET, FILM COATED ORAL at 23:19

## 2020-01-01 RX ADMIN — LISINOPRIL 10 MG: 10 TABLET ORAL at 08:59

## 2020-01-01 RX ADMIN — LEVETIRACETAM 500 MG: 5 INJECTION INTRAVENOUS at 20:48

## 2020-01-01 RX ADMIN — VANCOMYCIN HYDROCHLORIDE 1250 MG: 10 INJECTION, POWDER, LYOPHILIZED, FOR SOLUTION INTRAVENOUS at 20:04

## 2020-01-01 RX ADMIN — TAMSULOSIN HYDROCHLORIDE 0.4 MG: 0.4 CAPSULE ORAL at 09:25

## 2020-01-01 RX ADMIN — GABAPENTIN 200 MG: 100 CAPSULE ORAL at 21:05

## 2020-01-01 RX ADMIN — HYDRALAZINE HYDROCHLORIDE 25 MG: 25 TABLET, FILM COATED ORAL at 17:52

## 2020-01-01 RX ADMIN — MULTIPLE VITAMINS W/ MINERALS TAB 1 TABLET: TAB at 09:40

## 2020-01-01 RX ADMIN — VITAMIN D, TAB 1000IU (100/BT) 1000 UNITS: 25 TAB at 10:06

## 2020-01-01 RX ADMIN — HYDRALAZINE HYDROCHLORIDE 25 MG: 25 TABLET, FILM COATED ORAL at 21:36

## 2020-01-01 RX ADMIN — GABAPENTIN 200 MG: 100 CAPSULE ORAL at 09:25

## 2020-01-01 RX ADMIN — GABAPENTIN 200 MG: 100 CAPSULE ORAL at 14:41

## 2020-01-01 RX ADMIN — HYDRALAZINE HYDROCHLORIDE 10 MG: 20 INJECTION INTRAMUSCULAR; INTRAVENOUS at 12:56

## 2020-01-01 RX ADMIN — TAMSULOSIN HYDROCHLORIDE 0.4 MG: 0.4 CAPSULE ORAL at 09:30

## 2020-01-01 RX ADMIN — WARFARIN SODIUM 7.5 MG: 5 TABLET ORAL at 18:13

## 2020-01-01 RX ADMIN — CEFAZOLIN SODIUM 1000 MG: 1 SOLUTION INTRAVENOUS at 10:14

## 2020-01-01 RX ADMIN — LISINOPRIL 10 MG: 10 TABLET ORAL at 09:58

## 2020-01-01 RX ADMIN — ISOSORBIDE MONONITRATE 120 MG: 60 TABLET ORAL at 09:17

## 2020-01-01 RX ADMIN — VANCOMYCIN HYDROCHLORIDE 1000 MG: 1 INJECTION, POWDER, LYOPHILIZED, FOR SOLUTION INTRAVENOUS at 12:58

## 2020-01-01 RX ADMIN — GABAPENTIN 200 MG: 100 CAPSULE ORAL at 09:58

## 2020-01-01 RX ADMIN — SODIUM BICARBONATE 650 MG: 650 TABLET ORAL at 20:25

## 2020-01-01 RX ADMIN — GABAPENTIN 200 MG: 100 CAPSULE ORAL at 09:16

## 2020-01-01 RX ADMIN — HYDRALAZINE HYDROCHLORIDE 25 MG: 25 TABLET, FILM COATED ORAL at 13:29

## 2020-01-01 RX ADMIN — DEXTROSE AND SODIUM CHLORIDE: 5; 900 INJECTION, SOLUTION INTRAVENOUS at 10:33

## 2020-01-01 RX ADMIN — SODIUM CHLORIDE: 9 INJECTION, SOLUTION INTRAVENOUS at 17:40

## 2020-01-01 RX ADMIN — Medication 10 ML: at 23:18

## 2020-01-01 RX ADMIN — HYDRALAZINE HYDROCHLORIDE 25 MG: 25 TABLET, FILM COATED ORAL at 14:41

## 2020-01-01 RX ADMIN — CEFEPIME HYDROCHLORIDE 1 G: 1 INJECTION, POWDER, FOR SOLUTION INTRAMUSCULAR; INTRAVENOUS at 16:26

## 2020-01-01 RX ADMIN — ISOSORBIDE MONONITRATE 30 MG: 30 TABLET ORAL at 09:24

## 2020-01-01 RX ADMIN — CEFAZOLIN SODIUM 1000 MG: 1 SOLUTION INTRAVENOUS at 22:18

## 2020-01-01 RX ADMIN — HYDRALAZINE HYDROCHLORIDE 10 MG: 20 INJECTION INTRAMUSCULAR; INTRAVENOUS at 03:09

## 2020-01-01 RX ADMIN — SODIUM BICARBONATE 650 MG: 650 TABLET ORAL at 09:12

## 2020-01-01 RX ADMIN — TAMSULOSIN HYDROCHLORIDE 0.4 MG: 0.4 CAPSULE ORAL at 09:31

## 2020-01-01 RX ADMIN — LEVETIRACETAM 750 MG: 250 TABLET, FILM COATED ORAL at 20:32

## 2020-01-01 RX ADMIN — DEXTROSE AND SODIUM CHLORIDE: 5; 900 INJECTION, SOLUTION INTRAVENOUS at 13:30

## 2020-01-01 RX ADMIN — RAMIPRIL 2.5 MG: 2.5 CAPSULE ORAL at 09:39

## 2020-01-01 RX ADMIN — VITAMIN D, TAB 1000IU (100/BT) 1000 UNITS: 25 TAB at 20:16

## 2020-01-01 RX ADMIN — DOCUSATE SODIUM 100 MG: 100 CAPSULE, LIQUID FILLED ORAL at 08:23

## 2020-01-01 RX ADMIN — Medication 10 ML: at 21:28

## 2020-01-01 RX ADMIN — CEFAZOLIN SODIUM 1000 MG: 1 SOLUTION INTRAVENOUS at 09:16

## 2020-01-01 RX ADMIN — MULTIPLE VITAMINS W/ MINERALS TAB 1 TABLET: TAB at 09:25

## 2020-01-01 ASSESSMENT — PAIN SCALES - GENERAL
PAINLEVEL_OUTOF10: 0
PAINLEVEL_OUTOF10: 8
PAINLEVEL_OUTOF10: 0
PAINLEVEL_OUTOF10: 4
PAINLEVEL_OUTOF10: 0
PAINLEVEL_OUTOF10: 10
PAINLEVEL_OUTOF10: 0
PAINLEVEL_OUTOF10: 0
PAINLEVEL_OUTOF10: 7
PAINLEVEL_OUTOF10: 0
PAINLEVEL_OUTOF10: 1
PAINLEVEL_OUTOF10: 0
PAINLEVEL_OUTOF10: 9
PAINLEVEL_OUTOF10: 0
PAINLEVEL_OUTOF10: 3
PAINLEVEL_OUTOF10: 0
PAINLEVEL_OUTOF10: 3
PAINLEVEL_OUTOF10: 0
PAINLEVEL_OUTOF10: 7
PAINLEVEL_OUTOF10: 0

## 2020-01-01 ASSESSMENT — PULMONARY FUNCTION TESTS
PIF_VALUE: 0

## 2020-01-01 ASSESSMENT — PAIN DESCRIPTION - PAIN TYPE
TYPE: ACUTE PAIN
TYPE: SURGICAL PAIN
TYPE: ACUTE PAIN

## 2020-01-01 ASSESSMENT — ENCOUNTER SYMPTOMS
NAUSEA: 0
COUGH: 0
BACK PAIN: 0
ABDOMINAL PAIN: 0
NAUSEA: 0
COUGH: 0
ABDOMINAL PAIN: 0
DIARRHEA: 0
COLOR CHANGE: 0
CHEST TIGHTNESS: 0
ABDOMINAL PAIN: 0
COUGH: 0
NAUSEA: 1
CHEST TIGHTNESS: 0
BACK PAIN: 0
SORE THROAT: 0
VOMITING: 1
VOMITING: 0
EYES NEGATIVE: 1
RHINORRHEA: 0
BLOOD IN STOOL: 0
COUGH: 1
BLOOD IN STOOL: 0
DIARRHEA: 0
SHORTNESS OF BREATH: 0
SORE THROAT: 0
VOMITING: 0
SORE THROAT: 0
VOMITING: 0
EYE PAIN: 0
SHORTNESS OF BREATH: 1
CONSTIPATION: 0
COLOR CHANGE: 0
WHEEZING: 0
NAUSEA: 0
ABDOMINAL PAIN: 0
SHORTNESS OF BREATH: 0
RHINORRHEA: 0
COLOR CHANGE: 0

## 2020-01-01 ASSESSMENT — PAIN SCALES - WONG BAKER: WONGBAKER_NUMERICALRESPONSE: 0

## 2020-01-01 ASSESSMENT — PAIN DESCRIPTION - LOCATION
LOCATION: NECK;SHOULDER
LOCATION: ARM;SHOULDER
LOCATION: SHOULDER
LOCATION: ARM
LOCATION: HEAD
LOCATION: HEAD
LOCATION: CHEST

## 2020-01-01 ASSESSMENT — PAIN DESCRIPTION - FREQUENCY
FREQUENCY: INTERMITTENT
FREQUENCY: CONTINUOUS
FREQUENCY: INTERMITTENT

## 2020-01-01 ASSESSMENT — PAIN DESCRIPTION - ONSET
ONSET: ON-GOING
ONSET: GRADUAL
ONSET: ON-GOING

## 2020-01-01 ASSESSMENT — PAIN DESCRIPTION - DESCRIPTORS
DESCRIPTORS: HEADACHE
DESCRIPTORS: SHARP;STABBING
DESCRIPTORS: ACHING;SORE;DISCOMFORT

## 2020-01-01 ASSESSMENT — PAIN DESCRIPTION - ORIENTATION
ORIENTATION: RIGHT
ORIENTATION: LEFT;MID
ORIENTATION: RIGHT
ORIENTATION: RIGHT;LEFT
ORIENTATION: RIGHT
ORIENTATION: LEFT

## 2020-01-01 ASSESSMENT — PAIN - FUNCTIONAL ASSESSMENT
PAIN_FUNCTIONAL_ASSESSMENT: ACTIVITIES ARE NOT PREVENTED
PAIN_FUNCTIONAL_ASSESSMENT: PREVENTS OR INTERFERES SOME ACTIVE ACTIVITIES AND ADLS
PAIN_FUNCTIONAL_ASSESSMENT: PREVENTS OR INTERFERES SOME ACTIVE ACTIVITIES AND ADLS

## 2020-01-01 ASSESSMENT — PAIN DESCRIPTION - PROGRESSION: CLINICAL_PROGRESSION: NOT CHANGED

## 2020-01-16 NOTE — ED PROVIDER NOTES
home medications have been reviewed. Allergies: Lipitor        ---------------------------------------------------PHYSICAL EXAM--------------------------------------    Constitutional/General: Alert and oriented x3  Head: Normocephalic and atraumatic  Eyes: PERRL, EOMI, sclera non icteric  Mouth: Oropharynx clear, handling secretions, no trismus, no asymmetry of the posterior oropharynx or uvular edema  Neck: Paraspinal tenderness on the left   Respiratory: Lungs clear to auscultation bilaterally, no wheezes, rales, or rhonchi. Not in respiratory distress  Cardiovascular:  Regular rate. Regular rhythm. 2+ distal pulses. Equal extremity pulses. Chest: No chest wall tenderness  GI:  Abdomen Soft, Non tender, Non distended. No rebound, guarding, or rigidity. No pulsatile masses. Musculoskeletal: Moves all extremities x 4. Warm and well perfused, no clubbing, cyanosis, or edema. Capillary refill <3 seconds. Skin tear to the left shoulder   Integument: skin warm and dry. No rashes. Neurologic: GCS 15, no focal deficits, symmetric strength 5/5 in the upper and lower extremities bilaterally  Psychiatric: Normal Affect          -------------------------------------------------- RESULTS -------------------------------------------------  I have personally reviewed all laboratory and imaging results for this patient. Results are listed below.      LABS: (Lab results interpreted by me)  Results for orders placed or performed during the hospital encounter of 01/16/20   CBC Auto Differential   Result Value Ref Range    WBC 7.5 4.5 - 11.5 E9/L    RBC 5.02 3.80 - 5.80 E12/L    Hemoglobin 12.9 12.5 - 16.5 g/dL    Hematocrit 41.0 37.0 - 54.0 %    MCV 81.7 80.0 - 99.9 fL    MCH 25.7 (L) 26.0 - 35.0 pg    MCHC 31.5 (L) 32.0 - 34.5 %    RDW 13.8 11.5 - 15.0 fL    Platelets 101 653 - 253 E9/L    MPV 9.0 7.0 - 12.0 fL    Neutrophils % 83.7 (H) 43.0 - 80.0 %    Immature Granulocytes % 0.3 0.0 - 5.0 %    Lymphocytes % 8.5 (L) 20.0 - Meter Glucose 111 (H) 74 - 99 mg/dL   EKG 12 Lead   Result Value Ref Range    Ventricular Rate 58 BPM    Atrial Rate 55 BPM    QRS Duration 146 ms    Q-T Interval 502 ms    QTc Calculation (Bazett) 492 ms    R Axis -66 degrees    T Axis -38 degrees   ,       RADIOLOGY:  Interpreted by Radiologist unless otherwise specified  XR SHOULDER LEFT (MIN 2 VIEWS)   Final Result   Mild degenerative changes      XR CHEST PORTABLE   Final Result   Cardiomegaly   Findings compatible with atherosclerotic disease of the aorta. Probable chronic interstitial fibrosis with superimposed pulmonary   vascular congestion. CT Head WO Contrast   Final Result   Atrophy and chronic microvascular ischemic disease. CT Cervical Spine WO Contrast   Final Result   No acute fracture or dislocation. Severe degenerative spondylotic changes with multilevel spinal   canalicular stenoses from endplate spurring. Opacities at the lung apices. Consider correlation with chest   radiograph. EKG Interpretation  Interpreted by emergency department physician, Dr. Ifeoma Bailey, rate 58, no STEMI        ------------------------- NURSING NOTES AND VITALS REVIEWED ---------------------------   The nursing notes within the ED encounter and vital signs as below have been reviewed by myself  BP (!) 218/92   Pulse (!) 43   Temp 98.2 °F (36.8 °C) (Temporal)   Resp 19   Ht 5' 7\" (1.702 m)   Wt 140 lb (63.5 kg)   SpO2 96%   BMI 21.93 kg/m²     Oxygen Saturation Interpretation: Normal    The patients available past medical records and past encounters were reviewed. ------------------------------ ED COURSE/MEDICAL DECISION MAKING----------------------  Medications   hydrALAZINE (APRESOLINE) injection 10 mg (has no administration in time range)           The cardiac monitor revealed sinus with a heart rate in the 60s as interpreted by me.  The cardiac monitor was ordered secondary to the patient's dizziness and to monitor the patient for dysrhythmia. [facility] [id number]         Medical Decision Making:    Patient received IV fluids. Labs and imaging reviewed. During his evaluation, patient's heart rate would decrease into the 40s. This would resolve on its own. Given his dizziness, along with his bradycardia, patient will be admitted. Counseling: The emergency provider has spoken with the patient and discussed todays results, in addition to providing specific details for the plan of care and counseling regarding the diagnosis and prognosis. Questions are answered at this time and they are agreeable with the plan.       --------------------------------- IMPRESSION AND DISPOSITION ---------------------------------    IMPRESSION  1. Dizziness    2. Bradycardia    3. Essential hypertension        DISPOSITION  Disposition: Admit to telemetry  Patient condition is stable        NOTE: This report was transcribed using voice recognition software.  Every effort was made to ensure accuracy; however, inadvertent computerized transcription errors may be present       Bianca Mahmood MD  01/16/20 5413

## 2020-01-17 PROBLEM — I95.1 ORTHOSTATIC HYPOTENSION: Status: ACTIVE | Noted: 2020-01-01

## 2020-01-17 NOTE — DISCHARGE INSTR - COC
Bradycardia R00.1    Essential hypertension I10    Lumbar spondylosis M47.816    Benign prostatic hyperplasia without lower urinary tract symptoms N40.0    Stage 4 chronic kidney disease (HCC) N18.4    Vitamin D deficiency E55.9    Chronic atrial fibrillation I48.20    Orthostatic hypotension I95.1       Isolation/Infection:   Isolation          No Isolation        Patient Infection Status     None to display          Nurse Assessment:  Last Vital Signs: BP (!) 104/55   Pulse 79   Temp 98.7 °F (37.1 °C) (Temporal)   Resp 21   Ht 5' 7\" (1.702 m)   Wt 140 lb (63.5 kg)   SpO2 96%   BMI 21.93 kg/m²     Last documented pain score (0-10 scale): Pain Level: 0  Last Weight:   Wt Readings from Last 1 Encounters:   01/16/20 140 lb (63.5 kg)     Mental Status:  oriented and alert    IV Access:  - None    Nursing Mobility/ADLs:  Walking   Assisted  Transfer  Assisted  Bathing  Assisted  Dressing  Assisted  Toileting  Assisted  Feeding  Independent  Med Admin  Independent  Med Delivery   whole    Wound Care Documentation and Therapy:  Wound 01/16/20 Shoulder Left (Active)   Wound Skin Tear 1/17/2020  2:17 PM   Wound Cleansed Wound cleanser 1/17/2020  2:17 PM   Wound Length (cm) 6 cm 1/17/2020  2:17 PM   Wound Width (cm) 7 cm 1/17/2020  2:17 PM   Wound Depth (cm) 0.1 cm 1/17/2020  2:17 PM   Wound Surface Area (cm^2) 42 cm^2 1/17/2020  2:17 PM   Wound Volume (cm^3) 4.2 cm^3 1/17/2020  2:17 PM   Wound Assessment Red 1/17/2020  2:17 PM   Drainage Amount Scant 1/17/2020  2:17 PM   Drainage Description Serosanguinous 1/17/2020  2:17 PM   Odor None 1/17/2020  2:17 PM   Margins Unattached edges 1/16/2020  7:48 PM   Roxanne-wound Assessment Ecchymosis 1/17/2020  2:17 PM   Red%Wound Bed 100 1/17/2020  2:17 PM   Number of days: 0       Wound 01/16/20 Hand Anterior; Left (Active)   Wound Skin Tear 1/17/2020  2:17 PM   Dressing Status Changed 1/16/2020  7:48 PM   Dressing Changed Changed/New 1/16/2020  7:48 PM   Wound Cleansed Wound cleanser 1/17/2020  2:17 PM   Wound Length (cm) 0.6 cm 1/17/2020  2:17 PM   Wound Width (cm) 0.6 cm 1/17/2020  2:17 PM   Wound Depth (cm) 0.1 cm 1/17/2020  2:17 PM   Wound Surface Area (cm^2) 0.36 cm^2 1/17/2020  2:17 PM   Wound Volume (cm^3) 0.04 cm^3 1/17/2020  2:17 PM   Wound Assessment Red 1/17/2020  2:17 PM   Drainage Amount None 1/17/2020  2:17 PM   Drainage Description Serous 1/16/2020  3:30 PM   Odor None 1/16/2020  3:30 PM   Margins Unattached edges 1/16/2020  3:30 PM   Roxanne-wound Assessment Intact 1/17/2020  2:17 PM   Red%Wound Bed 100 1/17/2020  2:17 PM   Number of days: 0        Elimination:  Continence:   · Bowel: Yes  · Bladder: No  Urinary Catheter: None   Colostomy/Ileostomy/Ileal Conduit: No       Date of Last BM: 1/21/2020    Intake/Output Summary (Last 24 hours) at 1/17/2020 1455  Last data filed at 1/17/2020 1306  Gross per 24 hour   Intake 900 ml   Output 1400 ml   Net -500 ml     I/O last 3 completed shifts: In: 480 [P.O.:480]  Out: 1000 [Urine:1000]    Safety Concerns:     History of Falls (last 30 days) and At Risk for Falls    Impairments/Disabilities:      Vision and Hearing    Nutrition Therapy:  Current Nutrition Therapy:   - Oral Diet:  Low Sodium (2gm)    Routes of Feeding: Oral  Liquids: No Restrictions  Daily Fluid Restriction: no  Last Modified Barium Swallow with Video (Video Swallowing Test): not done    Treatments at the Time of Hospital Discharge:   Respiratory Treatments: none  Oxygen Therapy:  is not on home oxygen therapy.   Ventilator:    - No ventilator support    Rehab Therapies: PT and OT to eval and treat   Weight Bearing Status/Restrictions: No weight bearing restirctions  Other Medical Equipment (for information only, NOT a DME order):  walker  Other Treatments: none    Patient's personal belongings (please select all that are sent with patient):  [place]    RN SIGNATURE:  Electronically signed by Gely Mcdowell RN on 1/21/20 at 3:24 PM    CASE MANAGEMENT/SOCIAL WORK SECTION    Inpatient Status Date: ***    Readmission Risk Assessment Score:  Readmission Risk              Risk of Unplanned Readmission:        14           Discharging to Facility/ Agency   · Name: [facility]   · Address:  · Phone:  · Fax:    Dialysis Facility (if applicable)   · Name:  · Address:  · Dialysis Schedule:  · Phone:  · Fax:    / signature: Electronically signed by JOSIE Fischer on 1/21/2020 at 2:11 PM      PHYSICIAN SECTION    Prognosis: {Prognosis:5673108510}    Condition at Discharge: [address] [place] [address] Patient Condition:[id number]}    Rehab Potential (if transferring to Rehab): {Prognosis:6328106928}    Recommended Labs or Other Treatments After Discharge: ***    Physician Certification: I certify the above information and transfer of Paulina Berger  is necessary for the continuing treatment of the diagnosis listed and that he requires Hakeem Chase for less 30 days.      Update Admission H&P: {CHP DME Changes in IWYSE:310673021}    PHYSICIAN SIGNATURE:  Katy Mahoney DO

## 2020-01-17 NOTE — PROGRESS NOTES
Pharmacy Consultation Note  (Anticoagulant Dosing and Monitoring)    Initial consult date: 1/17/20  Consulting physician: Dr. Romel Del Rosario     Allergies:  Lipitor    80 y.o. male      Ht Readings from Last 1 Encounters:   01/16/20 5' 7\" (1.702 m)     Wt Readings from Last 1 Encounters:   01/16/20 140 lb (63.5 kg)         Warfarin Indication Target   INR Range Home   Dose  (if applicable) Diet/Feeding Tube   Chronic/persistent atrial flutter  2-3 5 mg po daily  Low sodium (1/16)       Vitamin K or Blood product  Administration Date                 Warfarin drug-drug interactions  Start  Stop Home Med? Comments                                 TSH:    Lab Results   Component Value Date    TSH 3.930 08/14/2018        Hepatic Function Panel:                            Lab Results   Component Value Date    ALKPHOS 76 01/17/2020    ALT 9 01/17/2020    AST 14 01/17/2020    PROT 6.5 01/17/2020    BILITOT 0.6 01/17/2020    LABALBU 3.3 01/17/2020    LABALBU 4.2 03/19/2012       Date Warfarin Dose INR Heparin or LMWH HBG/  HCT PLT Comment   1/16/20 7.5 mg 1.2 -- 12.9/41 177    1/17/20 7.5 mg 1.2 -- -- --                                 Assessment:  · 89 yom who presented to the ED for dizziness and fall. PMH is significant for chronic/persistent atrial flutter on warfarin, CKD, CAD, and HTN.   · INR goal 2-3; home warfarin dose 5 mg po daily   · INR 1.2    Plan:  · Warfarin 7.5 mg tonight  · Daily PT/INR until the INR is stable within the therapeutic range  · Pharmacist will follow and monitor/adjust dosing as necessary    Camila Ignacio PharmD, BCPS 1/17/2020 11:30 AM  Phone: [phone]

## 2020-01-17 NOTE — CONSULTS
[facility] MD Antonella Ferreira MD Elpidio Oris, MD Jhonny Candela, DO, [address] [place] [address] Tavon DICKSON .  Consultation  Patient's Name: Juan Mchugh  5:19 PM  1/17/2020    Nephrologist: Jeni Spaulding MD    Reason for Consult:  CKDIV  Requesting Physician:  Jorge Stack DO    Chief Complaint:  Fall     History Obtained From:  Patient , records . History of Present Ilness:      80y.o. year old male with chronic/persistent atrial flutter and right bundle branch block, chronic kidney disease, history of bradycardia documented since at least 2012 presenting this morning to the emergency department at [facility] after a fall.  States he just felt weak in the legs and they gave way, no syncope. Nephrology consulted for CKD . Pt has ckdiv at baseline with cr anywhere from 2.3-2.9 . Has around 1.5 g proteinuria based on protein/cr ratio . I saw Mr Jc Smith in his room , he was sitting on the chair , on RA . He appear euvolemic no LE edema no JVD he appear in NAD he feels ok , he was asking when can I go home . Past Medical History:   Diagnosis Date    Acute MI ([address]     Arrhythmia atrial     Bradycardia     CAD (coronary artery disease)     Hypertension     Moderate aortic stenosis 08/05/2019       Past Surgical History:   Procedure Laterality Date    CORONARY ARTERY BYPASS GRAFT      LEG SURGERY      VASCULAR SURGERY         History reviewed. No pertinent family history. reports that he quit smoking about 33 years ago. His smoking use included cigarettes. He started smoking about 75 years ago. He has a 21.00 pack-year smoking history. He has never used smokeless tobacco. He reports that he does not drink alcohol or use drugs.     Allergies:  Lipitor    Current Medications:    gabapentin (NEURONTIN) capsule 200 mg, TID  hydrALAZINE (APRESOLINE) tablet 50 mg, 2 times per day  isosorbide mononitrate (IMDUR) extended release tablet 60 mg, Daily  ondansetron (ZOFRAN) injection 4 mg, Q6H PRN  patiromer sorbitex calcium (VELTASSA) packet 8.4 g, Daily  ramipril (ALTACE) capsule 2.5 mg, Daily  sodium bicarbonate tablet 650 mg, BID  tamsulosin (FLOMAX) capsule 0.4 mg, Daily  Vitamin D (CHOLECALCIFEROL) tablet 1,000 Units, Daily  warfarin (COUMADIN) tablet 7.5 mg, Daily  therapeutic multivitamin-minerals 1 tablet, Daily        Review of Systems:   Constitutional: no fevers , no chills , feels ok   Eyes: no eye pain , no itching , no drainage  Ears, nose, mouth, throat, and face: no ear ,nose pain , hearing is ok ,no nasal drainage   Respiratory: no sob ,no cough ,no wheezing . Cardiovascular: no chest pain , no palpitation ,no sob . Gastrointestinal: no nausea, vomiting , constipation , no abdominal pain . Genitourinary:no urinary retention , no burning , dysuria . No polyuria   Hematologic/lymphatic: no bleeding , no cougulation issues . Musculoskeletal:no joint pain , no swelling . Neurological: no headaches ,no weakness , no numbness . Endocrine: no thirst , no weight issues . Physical exam:   Vital signs BP (!) 99/52   Pulse 73   Temp 98.7 °F (37.1 °C) (Temporal)   Resp 21   Ht 5' 7\" (1.702 m)   Wt 140 lb (63.5 kg)   SpO2 96%   BMI 21.93 kg/m²   Gen : NAD , appropriate for stated age . Head : at , nc   Neck : supple , no thyromegaly noted . Eyes : EOMI , PERRLA   CV : RRR , No M/R/G . Lungs: CTAB , no wheezing , good flow heard b/l   Abd : soft , NT , BS + , No Organomegaly appreciated . Skin : soft, dry . Neuro : CN  II-XII grossly intact , no focal neurologic deficit . Psych : cooperative .      Data:   Labs:  CBC with Differential:    Lab Results   Component Value Date    WBC 7.5 01/16/2020    RBC 5.02 01/16/2020    HGB 12.9 01/16/2020    HCT 41.0 01/16/2020     01/16/2020    MCV 81.7 01/16/2020    MCH 25.7 01/16/2020    MCHC 31.5 01/16/2020    RDW 13.8 01/16/2020    SEGSPCT 63 03/19/2012    LYMPHOPCT 8.5 01/16/2020    MONOPCT

## 2020-01-17 NOTE — H&P
[address]                  [address] [facility],  [facility]                              HISTORY AND PHYSICAL    PATIENT NAME: Laurel Patel                    :        1927  MED REC NO:   [id number]                            ROOM:       1036  ACCOUNT NO:   [de-identified]                           ADMIT DATE: 2020  PROVIDER:     Keagan Mahoney DO    CHIEF COMPLAINT/REASON FOR THIS HOSPITAL ADMISSION:  Falling, dizzy,  laid on floor for several hours, has not taken medication for one week. HISTORY OF PRESENT ILLNESS:  The patient is a 19-year-old  male  who presents to this emergency room for complaint of falling during the  night when he became dizzy, laid on the floor for a few hours, has not  taken his Coumadin for a week because he has not felt well. In the  emergency room, his white blood cell count was 7.5, H and H of 12.9 and  41 respectively. Platelets were 683,611. His INR was 1.2. BUN was 43,  creatinine 2.3. UA was negative. EKG and monitor revealed episodes of  significant bradycardia into the 40s. Chest x-ray revealed cardiomegaly  without any acute changes. CT scan of the brain without contrast  revealed no evidence for acute bleed and/or infarct. His blood pressure  upon arrival was 218/92. He was admitted with dizziness and bradycardia  and Cardiology was asked to see him in consultation. RECENT AND PRESENT MEDICATIONS:  Noted. PAST SURGICAL HISTORY:  Consistent with aortocoronary bypass. SOCIAL HISTORY:  Quit smoking 33 years ago. Denies any use of alcohol  and/or illicit drugs. FAMILY HISTORY:  Negative with regard to above chief complaint. ALLERGIES:  LIPITOR. REVIEW OF SYSTEMS:  Remainder of systems is otherwise negative. PHYSICAL ASSESSMENT:  GENERAL:  Reveals an elderly male in no acute distress.   VITAL SIGNS:  BP of 218/92, pulse rate of 43, temperature of 98.2,  respiratory rate

## 2020-01-17 NOTE — CONSULTS
[facility] [address] [place] [address]    Name: Teri Pickard    Age: 80 y.o. Date of Admission: 1/16/2020  8:47 AM    Date of Service: 1/17/2020    Reason for Consultation: bradycardia    Referring Physician: Dr Arturo Franco  Primary Care Physician: Pratik Sam DO    History of Present Illness: The patient is a 80y.o. year old male with chronic/persistent atrial flutter and right bundle branch block, chronic kidney disease, history of bradycardia documented since at least 2012 presenting this morning to the emergency department at [facility] after a fall. States he just felt weak in the legs and they gave way, no syncope. Denies lightheadedness to me. .  Denied any syncope, chest pain, palpitations, shortness breath,or sweating. He states since 1/12/2020 he has been nauseated and vomiting after starting a medication for his potasium. States has hardly eaten this past week. He's been asymptomatic in the emergency room. Telemetry has been showing atrial flutter with slow ventricular response in the 40s sometimes dipping to the upper 30s. I've seen no pauses thus far. He has a history of bypass surgery 1985 and again redo in 1991, permanent A. fib/flutter, chronic kidney disease, hypertension, peripheral vascular disease with previous lower extremity bypass 2005. Echo 8/2019 cLVH normal EF 70% moderate AS, mild to mod MR/TR, AUDIE. He as recently also evaluated by EP for bradycardia and had a 4 week event recorder done 2 months ago- no pauses.     Past Medical History:   Past Medical History:   Diagnosis Date    Acute MI ([address]     Arrhythmia atrial     Bradycardia     CAD (coronary artery disease)     Hypertension     Moderate aortic stenosis 08/05/2019       Review of Systems:   Constitutional: No fever, chills, sweats  Cardiac: As per HPI  Pulmonary: No cough, wheeze, hemoptysis  HEENT: No visual disturbances, difficult swallowing  GI: No nausea, vomiting, diarrhea, abdominal pain, rectal bleeding  : No dysuria or hematuria  Endocrine: No excessive thirst, heat or cold intolerance. Musculoskeletal: No joint pain or muscle aches. No claudication  Skin: No skin breakdown or rashes  Neuro: No headache, confusion, or seizures  Psych: No depression, anxiety    Family History:  History reviewed. No pertinent family history. Social History:  Social History     Socioeconomic History    Marital status: Single     Spouse name: Not on file    Number of children: Not on file    Years of education: Not on file    Highest education level: Not on file   Occupational History    Not on file   Social Needs    Financial resource strain: Not on file    Food insecurity:     Worry: Not on file     Inability: Not on file    Transportation needs:     Medical: Not on file     Non-medical: Not on file   Tobacco Use    Smoking status: Former Smoker     Packs/day: 0.50     Years: 42.00     Pack years: 21.00     Types: Cigarettes     Start date:      Last attempt to quit:      Years since quittin.0    Smokeless tobacco: Never Used   Substance and Sexual Activity    Alcohol use: No     Comment: occasionally    Drug use: Never    Sexual activity: Not on file   Lifestyle    Physical activity:     Days per week: Not on file     Minutes per session: Not on file    Stress: Not on file   Relationships    Social connections:     Talks on phone: Not on file     Gets together: Not on file     Attends Church service: Not on file     Active member of club or organization: Not on file     Attends meetings of clubs or organizations: Not on file     Relationship status: Not on file    Intimate partner violence:     Fear of current or ex partner: Not on file     Emotionally abused: Not on file     Physically abused: Not on file     Forced sexual activity: Not on file   Other Topics Concern    Not on file   Social History Narrative    Not on file       Allergies:   Allergies   Allergen Reactions    Lipitor      \"makes me weak\"       Home Medications:  Prior to Admission medications    Medication Sig Start Date End Date Taking? Authorizing Provider   patiromer sorbitex calcium (VELTASSA) 8.4 g PACK packet Take 8.4 g by mouth daily   Yes Historical Provider, MD   gabapentin (NEURONTIN) 100 MG capsule Take 200 mg by mouth 3 times daily. Yes Historical Provider, MD   ondansetron (ZOFRAN) 4 MG tablet Take 1 tablet by mouth 3 times daily as needed for Nausea or Vomiting 1/14/20  Yes Katy Bucdavidson DO   isosorbide mononitrate (IMDUR) 60 MG extended release tablet Take 1 tablet by mouth 2 times daily 12/27/19  Yes Katy Mahoney DO   warfarin (COUMADIN) 5 MG tablet Take 1 tablet by mouth daily 12/16/19  Yes Katy Mahoney DO   tamsulosin (FLOMAX) 0.4 MG capsule Take 1 capsule by mouth daily 11/20/19  Yes Katy Mahoney DO   hydrALAZINE (APRESOLINE) 50 MG tablet Take 50 mg by mouth 2 times daily   Yes Historical Provider, MD   Multiple Vitamins-Minerals (PRESERVISION AREDS) TABS Take 1 capsule by mouth 2 times daily   Yes Historical Provider, MD   Folinic Acid-Vit B6-Vit B12 (FOLINIC-PLUS PO) Take 1 tablet by mouth daily   Yes Historical Provider, MD   sodium bicarbonate 650 MG tablet Take 650 mg by mouth 2 times daily   Yes Historical Provider, MD   vitamin D (CHOLECALCIFEROL) 1000 UNIT TABS tablet Take 1,000 Units by mouth 2 times daily    Yes Historical Provider, MD   ramipril (ALTACE) 2.5 MG capsule Take 2.5 mg by mouth daily.      Yes Historical Provider, MD       Current Medications:  Current Facility-Administered Medications   Medication Dose Route Frequency Provider Last Rate Last Dose    gabapentin (NEURONTIN) capsule 200 mg  200 mg Oral TID Katy Buccino, DO   200 mg at 01/16/20 2028    hydrALAZINE (APRESOLINE) tablet 50 mg  50 mg Oral 2 times per day Katy Buccino, DO   50 mg at 01/16/20 2028    isosorbide mononitrate (IMDUR) extended release tablet 60 mg  60 mg Oral Daily Katy Bucdoryso DO 96 08/14/2018     Lab Results   Component Value Date    HDL 67 08/17/2019    HDL 63 02/13/2019    HDL 61 08/14/2018     Lab Results   Component Value Date    TRIG 91 08/17/2019    TRIG 95 02/13/2019    TRIG 96 08/14/2018     No components found for: CHLPL    TSH:  No results for input(s): TSH in the last 72 hours. Lab Results   Component Value Date    TSH 3.930 08/14/2018       ABGs:  No results for input(s): PH, PO2, PCO2, HCO3, BE, O2SAT in the last 72 hours. Lactic Acid:  No results for input(s): LACTA in the last 72 hours. Radiology:  RAD Results:  XR SHOULDER LEFT (MIN 2 VIEWS)   Final Result   Mild degenerative changes      XR CHEST PORTABLE   Final Result   Cardiomegaly   Findings compatible with atherosclerotic disease of the aorta. Probable chronic interstitial fibrosis with superimposed pulmonary   vascular congestion. CT Head WO Contrast   Final Result   Atrophy and chronic microvascular ischemic disease. CT Cervical Spine WO Contrast   Final Result   No acute fracture or dislocation. Severe degenerative spondylotic changes with multilevel spinal   canalicular stenoses from endplate spurring. Opacities at the lung apices. Consider correlation with chest   radiograph. VL Carotid Bilateral    (Results Pending)       Echo Summary 8/2019   Mild left ventricular concentric hypertrophy noted. Normal LV segmental wall motion. Ejection fraction is visually estimated at 70%. Stage II diastolic dysfunction. Borderline dilated right ventricle. Mildly enlarged right atrium size. The left atrium is mildly dilated. Moderate mitral annular calcification. Mild mitral regurgitation is present. Moderate aortic stenosis. Mild aortic regurgitation is noted. Mild tricuspid regurgitation. Pulmonary hypertension is mild to moderate .        EKG and Telemetry:  12-lead EKG personally reviewed and shows atrial fibrillationRBBB    Telemetry personally

## 2020-01-17 NOTE — PROGRESS NOTES
balance and fall prevention, need for further rehab to improve independence     Patient response to education:   Pt verbalized understanding Pt demonstrated skill Pt requires further education in this area   Yes  Yes  Reinforce      Comments:  Pt received supine and agreeable to PT evaluation. Vitals monitored during session. No physical assist during supine>sit. Pt performed STS with no WW and he was very unsteady on feet even in static standing. Returned to sitting and given [employer].  Pt's balance improved with [employer] but he still required hands on assistance for fall prevention. Pt demonstrated unsteady gait, ataxic like movements of lower extremities, and difficulty during R stance phase/L step through phase. Pt sat in chair at end of session and was instructed on not getting up without assistance and utilizing call button. Demonstrated good understanding. Also recommended to pt he uses [employer] from now on and he agreed. Rn notified of pt needing [employer] for transfers and that pt was up in chair. Pt left with call button in reach, lines attached, and needs met. Pt is at risk for future falls at home. Pt would benefit from continued PT services at discharge. Pts/ family goals   1. Improve balance     Patient and or family understand(s) diagnosis, prognosis, and plan of care. Yes     PLAN  PT care will be provided in accordance with the objectives noted above. Whenever appropriate, clear delegation orders will be provided for nursing staff. Exercises and functional mobility practice will be used as well as appropriate assistive devices or modalities to obtain goals. Patient and family education will also be administered as needed. Frequency of treatments: 2-5x/week x 7-10 days.     Time in  0745  Time out  [address], PT, DPT  [id number]

## 2020-01-18 NOTE — PROGRESS NOTES
Progress Note      SUBJECTIVE:  AWAKE AND ALERT  LEGS WEAK  NO PALPITATION  MONITOR--HR ABOUT 68    OBJECTIVE:      Medications    Infusion Medications   Scheduled Medications    isosorbide mononitrate  30 mg Oral Daily    docusate sodium  100 mg Oral BID    gabapentin  200 mg Oral TID    patiromer sorbitex calcium  8.4 g Oral Daily    ramipril  2.5 mg Oral Daily    sodium bicarbonate  650 mg Oral BID    tamsulosin  0.4 mg Oral Daily    Vitamin D  1,000 Units Oral Daily    warfarin  7.5 mg Oral Daily    therapeutic multivitamin-minerals  1 tablet Oral Daily       Physical  Temperature:  Current - Temp: 97.3 °F (36.3 °C); Max - Temp  Av.4 °F (36.9 °C)  Min: 97.3 °F (36.3 °C)  Max: 99 °F (37.2 °C)    Respiratory Rate : Resp  Av.2  Min: 16  Max: 21    Pulse Range: Pulse  Av.7  Min: 61  Max: 79    Blood Presuure Range:  Systolic (79HHP), WGF:867 , Min:87 , PKH:645   ; Diastolic (25XNC), GEJ:37, Min:44, Max:60      Pulse ox Range: SpO2  Av.7 %  Min: 94 %  Max: 97 %    24hr I & O:      Intake/Output Summary (Last 24 hours) at 2020 0911  Last data filed at 2020 1730  Gross per 24 hour   Intake 420 ml   Output 400 ml   Net 20 ml       Constitutional:  awake, alert, cooperative, no apparent distress, and appears stated age  Eyes:  pupils equal, round and reactive to light  ENT:  normocepalic, without obvious abnormality  NECK:  supple, symmetrical, trachea midline  HEMATOLOGIC/LYMPHATICS:  no cervical lymphadenopathy  LUNGS:  clear, no wheeze, no rhonchi  CARDIOVASCULAR:  Normal apical impulse,IRREG  ABDOMEN:  normal bowel sounds, non-distended, non-tender, no masses palpated  MUSCULOSKELETAL:  There is no redness, warmth, or swelling of the joints. Full range of motion noted. Motor strength is 5 out of 5 all extremities bilaterally.   Tone is normal.  SKIN:  normal skin color, texture, turgor    Data      CBC:   Lab Results   Component Value Date    WBC 7.5 2020    RBC 5.02 01/16/2020    HGB 12.9 01/16/2020    HCT 41.0 01/16/2020    MCV 81.7 01/16/2020    MCH 25.7 01/16/2020    MCHC 31.5 01/16/2020    RDW 13.8 01/16/2020     01/16/2020    MPV 9.0 01/16/2020     BMP:    Lab Results   Component Value Date     01/18/2020    K 3.6 01/18/2020    K 5.5 08/04/2019     01/18/2020    CO2 21 01/18/2020    BUN 72 01/18/2020    CREATININE 4.0 01/18/2020    CALCIUM 7.9 01/18/2020    GFRAA 17 01/18/2020    LABGLOM 14 01/18/2020    GLUCOSE 89 01/18/2020    GLUCOSE 120 03/19/2012     PT/INR:  No results found for: PTINR  Last 3 Troponin:  No components found for: TROPININI      ASSESSMENT:      Active Problems:    Bradycardia    Orthostatic hypotension  Resolved Problems:    * No resolved hospital problems.  *  GEN WEAKNESS            PLAN:  CARDIO INPUT REVIEWED  WATCH BP        Electronically signed by Matt Hussein MD on 1/18/2020 at 9:11 AM

## 2020-01-18 NOTE — PLAN OF CARE
Problem: Risk for Impaired Skin Integrity  Goal: Tissue integrity - skin and mucous membranes  Description  Structural intactness and normal physiological function of skin and  mucous membranes.   Outcome: Met This Shift     Problem: Falls - Risk of:  Goal: Will remain free from falls  Description  Will remain free from falls  Outcome: Met This Shift  Goal: Absence of physical injury  Description  Absence of physical injury  Outcome: Met This Shift     Problem: Pain:  Goal: Pain level will decrease  Description  Pain level will decrease  Outcome: Met This Shift

## 2020-01-18 NOTE — PLAN OF CARE
Problem: Pain:  Goal: Control of chronic pain  Description  Control of chronic pain  1/18/2020 1425 by Pete Washington RN  Outcome: Met This Shift  1/18/2020 1422 by Pete Washington RN  Outcome: Ongoing     Problem: Pain:  Goal: Pain level will decrease  Description  Pain level will decrease  1/18/2020 1425 by Pete Washington RN  Outcome: Met This Shift  1/18/2020 1422 by Pete Washington RN  Outcome: Ongoing

## 2020-01-18 NOTE — PLAN OF CARE
Problem: Risk for Impaired Skin Integrity  Goal: Tissue integrity - skin and mucous membranes  Description  Structural intactness and normal physiological function of skin and  mucous membranes.   1/17/2020 1948 by Mane Romo RN  Outcome: Met This Shift  1/17/2020 1948 by Mane Romo RN  Outcome: Met This Shift     Problem: Falls - Risk of:  Goal: Will remain free from falls  Description  Will remain free from falls  Outcome: Met This Shift  Goal: Absence of physical injury  Description  Absence of physical injury  1/17/2020 1948 by Mane Romo RN  Outcome: Met This Shift  1/17/2020 1948 by Mane Romo RN  Outcome: Met This Shift     Problem: Pain:  Goal: Pain level will decrease  Description  Pain level will decrease  Outcome: Met This Shift  Goal: Control of acute pain  Description  Control of acute pain  Outcome: Met This Shift     Problem: Skin Integrity:  Goal: Will show no infection signs and symptoms  Description  Will show no infection signs and symptoms  Outcome: Met This Shift  Goal: Absence of new skin breakdown  Description  Absence of new skin breakdown  Outcome: Met This Shift

## 2020-01-18 NOTE — PROGRESS NOTES
rhonchi, or wheezing. Heart:  Heart sounds are normal.  Regular rate and rhythm without murmur, gallop or rub. Abdomen:  Soft, non-tender. Extremities: Extremities warm to touch, pink, with no edema. Neuro/musculosketal:  Unremarkable. LABS:    Recent Labs     01/16/20  0910 01/17/20  0602    141   CREATININE 2.3* 2.6*       Recent Labs     01/16/20  0910   HGB 12.9       Recent Labs     01/16/20  0910 01/17/20  0602   INR 1.2 1.2         IMPRESSION:    1. Bradycardia- remains only mildly bradycardic at this time but had extensive evaluation in the last several months. Does not require pacemaker. Do not use beta-blockers or rate slowing calcium channel blockers. No clonidine. 2.  Permanent atrial fibrillation- rates are as above. No new antiarrhythmics added nor required. 2.  Anticoagulation status-INR subtherapeutic at 1.2. Warfarin for goal between 2 and 3.    4.  Chronic kidney disease- creatinine around 2.3-2.6. Nephrology following. 5.  Hypotension-stopped hydralazine as blood pressure is low. If blood pressure is low persistently would stop Flomax as it may be contributing. 6.  CAD-no indication of unstable angina. 7.  No further inpatient cardiac recommendations are pending. Sign off and have arranged follow-up in my office in approximately 6 weeks.

## 2020-01-19 NOTE — PROGRESS NOTES
throughout today  Potassium is 3.6, lower than usual for him as intake is poor.   Not tolerating Veltassa  BP rising since yesterday    Stop Veltassa  IV normal saline drip at conservative rate -we will reduce the rate somewhat as oral intake is improving  Resume ramipril at 2.5 mg daily  Encourage oral intake  Continue physical and Occupational Therapy  Follow labs, UO  Avoid nephrotoxins      Electronically signed by Sunil Mcduffie MD on 1/19/2020 at 5:59 PM

## 2020-01-19 NOTE — PROGRESS NOTES
moist  Neck: no JVD  Cardiovascular: S1, S2 regular rhythm, no murmur,or rub  Respiratory:  No crackles, no wheeze  Gastrointestinal:  Soft, nontender, nondistended, NABS  Ext: no edema, feet warm  Skin: dry, no rash  Neuro: awake, alert, interactive      DATA:    Recent Labs     01/16/20  0910   WBC 7.5   HGB 12.9   HCT 41.0   MCV 81.7        Recent Labs     01/16/20  0910 01/17/20  0602 01/18/20  0551    141 137   K 4.3 4.3 3.6    104 100   CO2 25 21* 21*   BUN 43* 50* 72*   CREATININE 2.3* 2.6* 4.0*   ALT 11 9 8   AST 20 14 11   BILITOT 0.7 0.6 0.5   ALKPHOS 85 76 67       Lab Results   Component Value Date    LABPROT 1.4 (H) 01/18/2020    LABPROT 1.4 01/18/2020    LABPROT 1.5 (H) 01/07/2020    LABPROT 1.5 01/07/2020       ASSESSMENT / RECOMMENDATIONS:    1. CKD Stage 4, baseline creatinine 2.3 to 2.9 mg/dL. Somewhat labile due to intermittent periods of bradycardia, often accompanied by hypotension. 2.  Acute kidney injury, having developed since arrival, secondary to prerenal azotemia due to poor intake, and increased insensible losses     3. Anemia due to CKD,      4. Secondary Hyperparathyroidism due to CKD,      5. Hypertension, essential.  Controlled. 5.5 hyperkalemia, secondary to chronic kidney disease. Maintain on Veltassa as outpatient so that we can use ramipril to treat CHF, CAD. Not tolerating it recently. 6.  Atrial fibrillation, rate controlled, on chronic anticoagulation (Coumadin). 7. coronary artery disease status post CABG x2 vessels  8. Status post fall      Appears volume contracted, consistent with known poor intake throughout today  Potassium is 3.6, lower than usual for him as intake is poor.   Not tolerating Veltassa      Stop Veltassa  IV normal saline drip at conservative rate  Stop ramipril for now  Encourage oral intake  Continue physical and Occupational Therapy  Follow labs, UO  Avoid nephrotoxins      Electronically signed by Sukhwinder Gant MD on 1/18/2020 at 8:16 PM

## 2020-01-19 NOTE — PLAN OF CARE
Problem: Risk for Impaired Skin Integrity  Goal: Tissue integrity - skin and mucous membranes  Description  Structural intactness and normal physiological function of skin and  mucous membranes.   1/18/2020 1949 by Awilda Smith RN  Outcome: Met This Shift     Problem: Falls - Risk of:  Goal: Will remain free from falls  Description  Will remain free from falls  1/18/2020 1949 by Awilda Smith RN  Outcome: Met This Shift     Problem: Pain:  Goal: Pain level will decrease  Description  Pain level will decrease  1/18/2020 1949 by Awilda Smith RN  Outcome: Met This Shift

## 2020-01-20 NOTE — PROGRESS NOTES
01/19/2020    LABALBU 4.2 03/19/2012    CREATININE 3.4 01/19/2020    CALCIUM 7.6 01/19/2020    GFRAA 21 01/19/2020    LABGLOM 17 01/19/2020     PT/INR:    Lab Results   Component Value Date    PROTIME 18.3 01/19/2020    PROTIME 16.8 04/16/2012    INR 1.6 01/19/2020     Last 3 Troponin:    Lab Results   Component Value Date    TROPONINI 0.06 01/16/2020    TROPONINI 0.05 08/03/2019    TROPONINI 0.04 03/19/2012     TSH:    Lab Results   Component Value Date    TSH 3.930 08/14/2018      Assessment:     1. AKIRA on CKD4  2. Bradycardia  3. Chronic atrial fibrillation  4. Moderate AS  5. CAD s/p CABG  6. PVD  7. Anemia secondary to CKD  8.  Orthostatic hypotension    Plan:       Continue same plan and orders    Home when ok with Nephrology

## 2020-01-20 NOTE — PLAN OF CARE
Problem: Falls - Risk of:  Goal: Will remain free from falls  Description  Will remain free from falls  1/20/2020 1627 by Michael Olguin RN  Outcome: Met This Shift     Problem: Pain:  Goal: Control of acute pain  Description  Control of acute pain  Outcome: Met This Shift

## 2020-01-20 NOTE — PROGRESS NOTES
Pharmacy Consultation Note  (Anticoagulant Dosing and Monitoring)    Initial consult date: 1/17/20  Consulting physician: Dr. Arturo Franco     Allergies:  Lipitor    80 y.o. male      Ht Readings from Last 1 Encounters:   01/16/20 5' 7\" (1.702 m)     Wt Readings from Last 1 Encounters:   01/20/20 145 lb 1 oz (65.8 kg)         Warfarin Indication Target   INR Range Home   Dose  (if applicable) Diet/Feeding Tube   Chronic/persistent atrial flutter  2-3 5 mg po daily  Low sodium (1/16)       Vitamin K or Blood product  Administration Date                 Warfarin drug-drug interactions  Start  Stop Home Med? Comments                                 TSH:    Lab Results   Component Value Date    TSH 3.930 08/14/2018        Hepatic Function Panel:                            Lab Results   Component Value Date    ALKPHOS 68 01/20/2020    ALT 9 01/20/2020    AST 13 01/20/2020    PROT 6.0 01/20/2020    BILITOT 0.4 01/20/2020    LABALBU 2.8 01/20/2020    LABALBU 4.2 03/19/2012       Date Warfarin Dose INR Heparin or LMWH HBG/  HCT PLT Comment   1/16/20 7.5 mg 1.2 -- 12.9/41 177    1/17/20 7.5 mg 1.2 -- -- --    1/18/20 7.5 mg 1.4 -- -- --    1/19/20 10 mg 1.6 -- --     1/20/20 HOLD 2.5    Repeat INR 3 -- 9.8/31.9 136      Assessment:  · 89 yom who presented to the ED for dizziness and fall. PMH is significant for chronic/persistent atrial flutter on warfarin, CKD, CAD, and HTN. · INR goal 2-3; home warfarin dose 5 mg po daily   · 1/18: INR 1.4  · 1/19: INR 1.6  · 1/20: INR 2.5, decrease in H/H and platelets since admission. Due to large jump in INR will obtain repeat INR this afternoon.      Plan:  · Warfarin 7.5 mg po x 1 dose  · Daily PT/INR until the INR is stable within the therapeutic range  · Pharmacist will follow and monitor/adjust dosing as necessary    Savanah Felix, PharmD, BCPS 1/20/2020 12:00 PM  Phone: [phone]    ADDENDUM:  - Hold warfarin tonight due to large jump in INR, repeat INR is 3 and still have not seen

## 2020-01-20 NOTE — PROGRESS NOTES
180/70 -- -- -- -- -- --   01/20/20 0102 (!) 200/80 99.5 °F (37.5 °C) Temporal (!) 48 18 96 % --   01/19/20 1608 132/61 98.7 °F (37.1 °C) Temporal 69 18 97 % --   @      Intake/Output Summary (Last 24 hours) at 1/20/2020 1222  Last data filed at 1/20/2020 0906  Gross per 24 hour   Intake 2438 ml   Output 1545 ml   Net 893 ml         Wt Readings from Last 3 Encounters:   01/20/20 145 lb 1 oz (65.8 kg)   08/13/19 139 lb 6.4 oz (63.2 kg)   08/03/19 145 lb (65.8 kg)       Constitutional:  in no acute distress  Oral: mucus membranes moist  Neck: no JVD  Cardiovascular: S1, S2 regular rhythm, no murmur,or rub  Respiratory:  No crackles, no wheeze  Gastrointestinal:  Soft, nontender, nondistended, NABS  Ext: no edema, feet warm  Skin: dry, no rash multiple ecchymoses in upper and lower extremities  Neuro: awake, alert, interactive      DATA:    Recent Labs     01/20/20  0636   WBC 8.0   HGB 9.8*   HCT 31.9*   MCV 82.9        Recent Labs     01/18/20  0551 01/19/20  0558 01/20/20  0636    139 137   K 3.6 3.9 4.0    104 104   CO2 21* 21* 19*   MG  --  1.9 1.9   PHOS  --  3.9 3.3   BUN 72* 68* 58*   CREATININE 4.0* 3.4* 2.7*   ALT 8 9 9   AST 11 14 13   BILITOT 0.5 0.3 0.4   ALKPHOS 67 74 68       Lab Results   Component Value Date    LABPROT 1.4 (H) 01/18/2020    LABPROT 1.4 01/18/2020    LABPROT 1.5 (H) 01/07/2020    LABPROT 1.5 01/07/2020       ASSESSMENT / RECOMMENDATIONS:    1. CKD Stage 4, baseline creatinine 2.3 to 2.9 mg/dL. Somewhat labile due to intermittent periods of bradycardia, often accompanied by hypotension. 2.  Acute kidney injury, having developed since arrival, secondary to prerenal azotemia due to poor intake, and increased insensible losses     3. Anemia due to CKD,      4. Secondary Hyperparathyroidism due to CKD,      5. Hypertension, essential.  Controlled. 5.5 hyperkalemia, secondary to chronic kidney disease.   Maintain on Veltassa as outpatient so that we can use ramipril to treat CHF, CAD. Not tolerating it recently. 6.  Atrial fibrillation, rate controlled, on chronic anticoagulation (Coumadin). 7. coronary artery disease status post CABG x2 vessels  8.   Status post fall      IV normal saline drip at conservative rate -we will reduce the rate somewhat as oral intake is improving  Resume ramipril at 2.5 mg daily  Encourage oral intake  Continue physical and Occupational Therapy  Follow labs, UO  Avoid nephrotoxins      Electronically signed by Trista Bah MD on 1/20/2020 at 12:22 PM

## 2020-01-21 NOTE — PROGRESS NOTES
08/04/2019     01/20/2020    CO2 19 01/20/2020    BUN 58 01/20/2020    LABALBU 2.8 01/20/2020    LABALBU 4.2 03/19/2012    CREATININE 2.7 01/20/2020    CALCIUM 7.2 01/20/2020    GFRAA 27 01/20/2020    LABGLOM 22 01/20/2020     PT/INR:    Lab Results   Component Value Date    PROTIME 34.3 01/20/2020    PROTIME 16.8 04/16/2012    INR 3.0 01/20/2020     Last 3 Troponin:    Lab Results   Component Value Date    TROPONINI 0.06 01/16/2020    TROPONINI 0.05 08/03/2019    TROPONINI 0.04 03/19/2012     TSH:    Lab Results   Component Value Date    TSH 3.930 08/14/2018      Assessment:     1. AKIRA on CKD4  2. Bradycardia  3. Chronic atrial fibrillation  4.  Orthostatic hypotension    Plan:       Continue same plan and orders

## 2020-01-21 NOTE — PROGRESS NOTES
[facility] MD Cookie Wood MD Manuel Pao, MD Arlin Mire, CNP   Silvana Melgoza, HAMZAH  Progress Note    1/21/2020    SUBJECTIVE:   1/18: Ongoing poor intake. Intermittent periods of confusion. The Veltassa \"makes me sick,\" so he refused to take it. Denies nausea or vomiting currently. (-) sob/wagoner/cp/palp Appetite poor, with poor intake today. .  Eating and drinking much better today than yesterday. More awake and alert, interactive. Denies dyspnea. Some mild pain in his arm, though improving status post Tylenol. No abdominal pain. No peripheral swelling. ROS otherwise unremarkable. 1/20 : in bed . NAD . Appear comfortable      1/21 : seen today ,feels ok on RA euvolemic     PROBLEM LIST:    Active Problems:    Bradycardia    Orthostatic hypotension  Resolved Problems:    * No resolved hospital problems.  *         DIET:    DIET LOW SODIUM 2 GM;     MEDS (scheduled):    warfarin  5 mg Oral Once    sodium chloride flush  10 mL Intravenous BID    warfarin (COUMADIN) daily dosing (placeholder)   Other RX Placeholder    ramipril  2.5 mg Oral Daily    isosorbide mononitrate  30 mg Oral Daily    docusate sodium  100 mg Oral BID    gabapentin  200 mg Oral TID    sodium bicarbonate  650 mg Oral BID    tamsulosin  0.4 mg Oral Daily    Vitamin D  1,000 Units Oral Daily    therapeutic multivitamin-minerals  1 tablet Oral Daily       MEDS (infusions):   sodium chloride 30 mL/hr at 01/20/20 1331       MEDS (prn):  sodium chloride flush, acetaminophen, ondansetron    PHYSICAL EXAM:     Patient Vitals for the past 24 hrs:   BP Temp Temp src Pulse Resp SpO2   01/20/20 2345 (!) 148/70 -- -- -- -- --   01/20/20 2330 (!) 165/74 98.9 °F (37.2 °C) Temporal 56 20 95 %   01/20/20 1915 138/62 98.5 °F (36.9 °C) Temporal 52 18 97 %   01/20/20 1500 (!) 152/70 97 °F (36.1 °C) Temporal 68 18 96 %   01/20/20 1100 (!) 156/68 97.2 °F (36.2 °C) Temporal 67 20 97 % @      Intake/Output Summary (Last 24 hours) at 1/21/2020 1030  Last data filed at 1/21/2020 0840  Gross per 24 hour   Intake 1248 ml   Output 475 ml   Net 773 ml         Wt Readings from Last 3 Encounters:   01/20/20 145 lb 1 oz (65.8 kg)   08/13/19 139 lb 6.4 oz (63.2 kg)   08/03/19 145 lb (65.8 kg)       Constitutional:  in no acute distress  Oral: mucus membranes moist  Neck: no JVD  Cardiovascular: S1, S2 regular rhythm, no murmur,or rub  Respiratory:  No crackles, no wheeze  Gastrointestinal:  Soft, nontender, nondistended, NABS  Ext: no edema, feet warm  Skin: dry, no rash multiple ecchymoses in upper and lower extremities  Neuro: awake, alert, interactive      DATA:    Recent Labs     01/20/20  0636   WBC 8.0   HGB 9.8*   HCT 31.9*   MCV 82.9        Recent Labs     01/19/20  0558 01/20/20  0636 01/21/20  0606    137 140   K 3.9 4.0 4.3    104 108*   CO2 21* 19* 19*   MG 1.9 1.9  --    PHOS 3.9 3.3  --    BUN 68* 58* 56*   CREATININE 3.4* 2.7* 2.7*   ALT 9 9 10   AST 14 13 16   BILITOT 0.3 0.4 0.4   ALKPHOS 74 68 73       Lab Results   Component Value Date    LABPROT 1.4 (H) 01/18/2020    LABPROT 1.4 01/18/2020    LABPROT 1.5 (H) 01/07/2020    LABPROT 1.5 01/07/2020       ASSESSMENT / RECOMMENDATIONS:    1. CKD Stage 4, baseline creatinine 2.3 to 2.9 mg/dL. Somewhat labile due to intermittent periods of bradycardia, often accompanied by hypotension. 2.  Acute kidney injury, having developed since arrival, secondary to prerenal azotemia due to poor intake, and increased insensible losses     3. Anemia due to CKD,      4. Secondary Hyperparathyroidism due to CKD,      5. Hypertension, essential.  Controlled. 5.5 hyperkalemia, secondary to chronic kidney disease. Maintain on Veltassa as outpatient so that we can use ramipril to treat CHF, CAD. Not tolerating it recently. 6.  Atrial fibrillation, rate controlled, on chronic anticoagulation (Coumadin).   7. coronary artery disease status post CABG x2 vessels  8.   Status post fall      If good PO intake then ok to stop iv fluids   Resume ramipril at 2.5 mg daily  Encourage oral intake  Continue physical and Occupational Therapy  Follow labs, UO  Avoid nephrotoxins      Electronically signed by Jeni Spaulding MD on 1/21/2020 at 10:30 AM

## 2020-01-21 NOTE — PLAN OF CARE
Problem: Risk for Impaired Skin Integrity  Goal: Tissue integrity - skin and mucous membranes  Description  Structural intactness and normal physiological function of skin and  mucous membranes.   1/21/2020 1242 by Alejo Pringle RN  Outcome: Met This Shift  1/21/2020 0438 by Baldo Aponte RN  Outcome: Met This Shift     Problem: Falls - Risk of:  Goal: Will remain free from falls  Description  Will remain free from falls  1/21/2020 1242 by Alejo Pringle RN  Outcome: Met This Shift  1/21/2020 0438 by Baldo Aponte RN  Outcome: Met This Shift  Goal: Absence of physical injury  Description  Absence of physical injury  1/21/2020 1242 by Alejo Pringle RN  Outcome: Met This Shift  1/21/2020 0438 by Baldo Aponte RN  Outcome: Met This Shift     Problem: Pain:  Goal: Pain level will decrease  Description  Pain level will decrease  Outcome: Met This Shift  Goal: Control of acute pain  Description  Control of acute pain  Outcome: Met This Shift  Goal: Control of chronic pain  Description  Control of chronic pain  1/21/2020 1242 by Alejo Pringle RN  Outcome: Met This Shift  1/21/2020 0438 by Baldo Aponte RN  Outcome: Met This Shift     Problem: Skin Integrity:  Goal: Will show no infection signs and symptoms  Description  Will show no infection signs and symptoms  1/21/2020 1242 by Alejo Pringle RN  Outcome: Met This Shift  1/21/2020 0438 by Baldo Aponte RN  Outcome: Met This Shift  Goal: Absence of new skin breakdown  Description  Absence of new skin breakdown  1/21/2020 1242 by Alejo Pringle RN  Outcome: Met This Shift  1/21/2020 0438 by Baldo Aponte RN  Outcome: Met This Shift

## 2020-01-21 NOTE — CARE COORDINATION
SOCIAL WORK/CASEMANAGEMENT TRANSITION OF CARE PLANNING: precert obtained and good for 48 hours. rn to check with renal and pcp. Miguel Angel Diaz  1/21/2020    Discharge to [facility] today. Jack Guardado the dpoa will be here around 6 p.m. to take pt. Snf;loc. precert obtained. Called aurelia and comfort care jake Vizcarra without any availability.  Miguel Angel Diaz  1/21/2020

## 2020-01-21 NOTE — PROGRESS NOTES
Nurse to nurse called to [facility].  Faxed discharge report    Alejo Pringle, JOJO

## 2020-01-21 NOTE — PROGRESS NOTES
foot        Patient education  Pt educated on PT role, safety during functional mobility, using [address] for improved balance and fall prevention, need for further rehab to improve independence use of call light. Patient response to education:   Pt verbalized understanding Pt demonstrated skill Pt requires further education in this area   Yes  Yes  Reinforce      Comments:  Pt received supine and agreeable to PT tx. Pt cleared by nurse prior to tx session. Pt required no assistance for bed mobility. Pt sat EOB and donned socks. Pt required cues for hand placement during transfers. Pt amb with ww ModA due to lateral and posterior lean. B knees flexed. Pt required assistance to steer ww. Pt agreed to sit up in chair and perform ex. Pt given call light and tray table. Frequency of treatments: 2-5x/week x 7-10 days.     Time in  11:13  Time out11:36    Lorie Mckenzie [id number]

## 2020-01-21 NOTE — PROGRESS NOTES
supine: Minimal Assist   SBA  Supine<>EOB   Rolling: Independent   Supine to sit: Modified [place]   Sit to supine: Modified [place]    Functional Transfers Sit to stand: Mod A  Stand to sit: Min A modA  Sit to Stand  Stand to Sit  SBA   Functional Mobility Mod A w/ ww  Limited to light mobility in room  modA  Pt ambulated short distance in room, poor gait and safety awareness SBA w/ ww   Balance Sitting: SBA  Standing: Min/mod A w/ ww Sitting: SBA  Standing:modA      Activity Tolerance Fair  Fair Good   Visual/  Perceptual Glasses: readers                       Hand dominance: R    Strength ROM Additional Info:    RUE  Distal: 3+/5  Proximal: 2/5  Distal AROM: WFL  Proximal AROM: limited     Proximal AAROM: WFL    good  and wfl FMC/dexterity noted during ADL tasks         LUE Distal: 3+/5  Proximal: 3/5  WFL    good  and wfl FMC/dexterity noted during ADL tasks         Hearing: [place]  Sensation:  No c/o numbness or tingling   Tone: WFL   Edema: none noted          Comments: Upon arrival pt lying supine in bed, agreeable to therapy. Pt completed ADL dressing task, bed mobility, functinal mobility, and transfers throughout session. Pt educated on safety with bed mobility, fucntional mobility and transfers throughout session. Pt also educated of UB exercises to increase strength and ROM of BUE's, eduating pt on RUE to assist with LUE exercises and tasks throughout the day. At end of session, pt sitting upright in chair, all lines and tubes intact, call light within reach. · Pt has made fair progress towards set goals.    · Continue with current plan of care    Time in: 11:13am  Time out:11:37am  Total Tx Time: 23minutes    Deborah ALEJANDRO/L [id number]

## 2020-01-21 NOTE — PROGRESS NOTES
Pharmacy Consultation Note  (Anticoagulant Dosing and Monitoring)    Initial consult date: 1/17/20  Consulting physician: Dr. Allie Sandoval     Allergies:  Lipitor    80 y.o. male      Ht Readings from Last 1 Encounters:   01/16/20 5' 7\" (1.702 m)     Wt Readings from Last 1 Encounters:   01/20/20 145 lb 1 oz (65.8 kg)         Warfarin Indication Target   INR [place]   Dose  (if applicable) Diet/Feeding Tube   Chronic/persistent atrial flutter  2-3 5 mg po daily  Low sodium (1/16)       Vitamin K or Blood product  Administration Date                 Warfarin drug-drug interactions  Start  Stop Home Med? Comments                                 TSH:    Lab Results   Component Value Date    TSH 3.930 08/14/2018        Hepatic Function Panel:                            Lab Results   Component Value Date    ALKPHOS 73 01/21/2020    ALT 10 01/21/2020    AST 16 01/21/2020    PROT 6.3 01/21/2020    BILITOT 0.4 01/21/2020    LABALBU 2.9 01/21/2020    LABALBU 4.2 03/19/2012       Date Warfarin Dose INR Heparin or LMWH HBG/  HCT PLT Comment   1/16/20 7.5 mg 1.2 -- 12.9/41 177    1/17/20 7.5 mg 1.2 -- -- --    1/18/20 7.5 mg 1.4 -- -- --    1/19/20 10 mg 1.6 -- --     1/20/20 HOLD 2.5    Repeat INR 3 -- 9.8/31.9 136    1/21/20 5 mg  2.9 -- -- --      Assessment:  · 89 yom who presented to the ED for dizziness and fall. PMH is significant for chronic/persistent atrial flutter on warfarin, CKD, CAD, and HTN. · INR goal 2-3; home warfarin dose 5 mg po daily   · 1/18: INR 1.4  · 1/19: INR 1.6  · 1/20: INR 2.5, decrease in H/H and platelets since admission. Due to large jump in INR will obtain repeat INR this afternoon.    · 1/21: INR 2.9    Plan:   · Warfarin 5 mg po x 1 dose  · Daily PT/INR until the INR is stable within the therapeutic range  · Pharmacist will follow and monitor/adjust dosing as necessary    Татьяна Chandler PharmD, BCPS 1/21/2020 8:45 AM  Phone: [phone]

## 2020-01-21 NOTE — PLAN OF CARE
Problem: Risk for Impaired Skin Integrity  Goal: Tissue integrity - skin and mucous membranes  Description  Structural intactness and normal physiological function of skin and  mucous membranes.   Outcome: Met This Shift     Problem: Falls - Risk of:  Goal: Will remain free from falls  Description  Will remain free from falls  1/21/2020 0438 by Abigail Rodríguez RN  Outcome: Met This Shift     Problem: Falls - Risk of:  Goal: Absence of physical injury  Description  Absence of physical injury  Outcome: Met This Shift     Problem: Pain:  Goal: Control of chronic pain  Description  Control of chronic pain  Outcome: Met This Shift     Problem: Skin Integrity:  Goal: Will show no infection signs and symptoms  Description  Will show no infection signs and symptoms  Outcome: Met This Shift     Problem: Skin Integrity:  Goal: Absence of new skin breakdown  Description  Absence of new skin breakdown  Outcome: Met This Shift

## 2020-01-21 NOTE — PROGRESS NOTES
OT BEDSIDE TREATMENT NOTE      Date:2020  Patient Name: Moncho Urbano  MRN: [id number]  : 3/9/1927  Room: [facility]     Attempted to see pt this a.m, with pt being on bed pan upon arrival, with pt refusing therapy until having of bowel movement. Nursing staff then present, with pt insisting nursing to call doctor asap to request medicine to make his bowels move, with nurse talking with pt on the need to be seen for therapy, with pt becoming irritated, and agitated, stating not until I have a bowel movement, now call the doctor right now. Will attempt again at later time.     [address] ALEJANDRO/L [id number]

## 2020-01-22 NOTE — CARE COORDINATION
[facility] Update Call    2020    Patient: Ileana Buchanan Patient : 3/9/1927   MRN: <[id number]>  Reason for Admission: Dizziness; Bradycardia; Essential Hypertension  Discharge Date: 20 RARS: Readmission Risk Score: 20       Spoke with Pedro Clifton,  at [facility] [place] and confirmed patient is currently at the facility. Discussed will be calling  for weekly patient progress updates; RN CTN contact information provided.

## 2020-01-29 NOTE — CARE COORDINATION
[facility] Update Call    2020    Patient: Ezekiel Myers Patient : 3/9/1927   MRN: <[id number]>  Reason for Admission: Dizziness; Bradycardia, Essential Hypertension  Discharge Date: 20 RARS: Readmission Risk Score: 20       Left voice message for Roberto Blount in  at [facility] requesting a call back with patient progress update. RN CTN contact information provided.

## 2020-02-05 NOTE — CARE COORDINATION
[facility] Update Call    2020    Patient: Jack Franklin Patient : 3/9/1927   MRN: <[id number]>  Reason for Admission: Dizziness; Bradycardia; Essential Hypertension  Discharge Date: 20 RARS: Readmission Risk Score: 20       Left voice message for Shefali Bustamante in  at [facility] requesting a call back with patient progress update. RN CTN contact information provided.

## 2020-02-13 PROBLEM — S06.5XAA SUBDURAL HEMATOMA: Status: ACTIVE | Noted: 2020-01-01

## 2020-02-13 PROBLEM — S06.5XAA BILATERAL SUBDURAL HEMATOMAS: Status: ACTIVE | Noted: 2020-01-01

## 2020-02-13 NOTE — ED NOTES
Bed: 01  Expected date:   Expected time:   Means of arrival:   Comments:  milan Dudley RN  02/13/20 0833

## 2020-02-14 PROBLEM — N17.9 AKI (ACUTE KIDNEY INJURY) (HCC): Status: ACTIVE | Noted: 2020-01-01

## 2020-02-14 NOTE — PROGRESS NOTES
Therapeutic Exercises:  Functional activity     Patient education  Pt educated on safety, sequencing of transfers, and role of PT    Patient response to education:   Pt verbalized understanding Pt demonstrated skill Pt requires further education in this area   Yes  With cues  Yes      ASSESSMENT:    Comments:  Pt received supine in bed and agreeable to PT session with OT collaboration. RN cleared pt for participation prior to session. Pt is self limiting with activity and requires motivation to participate. Assist to BLEs needed to sit up to EOB and return to supine. Stood and took side steps at EOB with use of [employer] - pt fairly steady but taking small steps and refusing further ambulation. Returned to supine at pt request. Pt with all needs met and call light in reach. Pt would benefit from continued PT POC to address functional deficits described above. Treatment:  Patient practiced and was instructed in the following treatment:     Patient education provided continuously throughout session for sequencing, safety maintenance, and improving any deficits found during the evaluation.  Sitting EOB x 5 minutes with cues for sitting without UE support   Standing x 1 minutes to increase activity tolerance.  Side stepping EOB with [employer] for gait assessment - pt self limiting and is capable of further ambulation. Assist for balance and cues for step width. · Skilled positioning - Pt placed in upright position in bed with pillows utilized to facilitate upright posture, joint and skin integrity, and interaction with environment. · Non-pharmacological treatment and prevention of ICU delirium - Pt oriented to date, time, time of day, place, and situation as well as provided with visual and auditory stimuli in order to improve cognition and combat effects of ICU delirium. Pt's/ family goals   1. None stated     Patient and or family understand(s) diagnosis, prognosis, and plan of care.   Yes     PLAN:    PT

## 2020-02-14 NOTE — PLAN OF CARE
Problem: Risk for Impaired Skin Integrity  Goal: Tissue integrity - skin and mucous membranes  Description  Structural intactness and normal physiological function of skin and  mucous membranes.   Outcome: Met This Shift     Problem: Falls - Risk of:  Goal: Will remain free from falls  Description  Will remain free from falls  Outcome: Met This Shift  Goal: Absence of physical injury  Description  Absence of physical injury  Outcome: Met This Shift     Problem: Coping:  Goal: Ability to remain calm will improve  Description  Ability to remain calm will improve  Outcome: Met This Shift     Problem: Safety:  Goal: Ability to remain free from injury will improve  Description  Ability to remain free from injury will improve  Outcome: Met This Shift     Problem: Self-Care:  Goal: Ability to participate in self-care as condition permits will improve  Description  Ability to participate in self-care as condition permits will improve  Outcome: Met This Shift

## 2020-02-14 NOTE — CONSULTS
[facility] MD Barbara Aviles MD Charity Share, MD Donnell Gathers, DO, [address] Tav[address] DICKSON .  Consultation  Patient's Name: Darrell Jain  5:04 PM  2/14/2020    Nephrologist: Blanca Singh MD    Reason for Consult:  CKD IV   Requesting Physician:  Guillermina Villegas DO    Chief Complaint:  Fall    History Obtained From:  Patient , records , staff . History of Present Ilness:      80 y. o. year old male with chronic/persistent atrial flutter and right bundle branch block, chronic kidney disease, history of bradycardia documented since at least 2012 presenting this morning to the emergency department at [facility] with cc of  complaint of persisted head and neck pain and was confused. He had had a bout of emesis earlier in the day. Apparently, he has  fallen several times in the last month, most recently fell asleep and  slipped out of his chair on Monday and hit his head on the ground. CT scan performed of the brain without enhancement revealed the presence of  what appears to be subacute to chronic bilateral subdural hematomas.      Nephrology consulted for CKD . Pt has ckdiv at baseline with cr anywhere from 2.3-2.9 . Has around 1.5 g proteinuria based on protein/cr ratio  Pt seen in ICU . He was seen by neurosurgery and chose no surgical intervention , actually I am being told he decided to make his code status DNR-CCA . Pt was initially on Cardene this am , he was able to be weaned off cardene drip . His BP is 130s when I am seeing him he is on RA, he look euvolemic . Past Medical History:   Diagnosis Date    Acute MI ([address]     Arrhythmia atrial     Bradycardia     CAD (coronary artery disease)     Hemiparesis (HCC)     Hypertension     Moderate aortic stenosis 08/05/2019       Past Surgical History:   Procedure Laterality Date    CORONARY ARTERY BYPASS GRAFT      LEG SURGERY      VASCULAR SURGERY         History reviewed.  No pertinent Abd : soft , NT , BS + , No Organomegaly appreciated . Skin : soft, dry . Neuro : CN  II-XII grossly intact , no focal neurologic deficit . Psych : cooperative .      Data:   Labs:  CBC with Differential:    Lab Results   Component Value Date    WBC 9.4 02/14/2020    RBC 4.41 02/14/2020    HGB 11.4 02/14/2020    HCT 36.4 02/14/2020     02/14/2020    MCV 82.5 02/14/2020    MCH 25.9 02/14/2020    MCHC 31.3 02/14/2020    RDW 14.5 02/14/2020    SEGSPCT 63 03/19/2012    LYMPHOPCT 6.9 02/14/2020    MONOPCT 7.5 02/14/2020    BASOPCT 0.1 02/14/2020    MONOSABS 0.71 02/14/2020    LYMPHSABS 0.65 02/14/2020    EOSABS 0.05 02/14/2020    BASOSABS 0.01 02/14/2020     CMP:    Lab Results   Component Value Date     02/14/2020    K 5.0 02/14/2020     02/14/2020    CO2 23 02/14/2020    BUN 48 02/14/2020    CREATININE 2.3 02/14/2020    GFRAA 32 02/14/2020    LABGLOM 27 02/14/2020    GLUCOSE 100 02/14/2020    GLUCOSE 120 03/19/2012    PROT 6.7 02/13/2020    LABALBU 3.5 02/13/2020    LABALBU 4.2 03/19/2012    CALCIUM 8.7 02/14/2020    BILITOT 0.4 02/13/2020    ALKPHOS 85 02/13/2020    AST 20 02/13/2020    ALT 11 02/13/2020     Ionized Calcium:  No results found for: IONCA  Magnesium:    Lab Results   Component Value Date    MG 1.9 01/20/2020     Phosphorus:    Lab Results   Component Value Date    PHOS 2.4 02/14/2020     U/A:    Lab Results   Component Value Date    COLORU Yellow 02/13/2020    PHUR 6.0 02/13/2020    LABCAST RARE 01/16/2020    WBCUA 0-1 02/13/2020    WBCUA 0-1 11/28/2011    RBCUA 0-1 02/13/2020    RBCUA NONE 01/29/2014    BACTERIA NONE SEEN 02/13/2020    CLARITYU Clear 02/13/2020    SPECGRAV 1.020 02/13/2020    LEUKOCYTESUR Negative 02/13/2020    UROBILINOGEN 0.2 02/13/2020    BILIRUBINUR Negative 02/13/2020    BILIRUBINUR NEGATIVE 11/28/2011    BLOODU Negative 02/13/2020    GLUCOSEU Negative 02/13/2020    GLUCOSEU NEGATIVE 11/28/2011    AMORPHOUS FEW 02/10/2016     Microalbumen/Creatinine

## 2020-02-14 NOTE — CONSULTS
No motor or sensory deficits. DIAGNOSES:  Bilateral chronic subdural hematoma, multiple medical  issues. Reversed his Coumadin. Full anticoagulation contraindicated  until complete resolution of any intracranial hemorrhage. Informed  consent for asif hole evacuation was obtained and is planned for Monday  as long as he is medically stable. For now, continue ICU management.         Gely James MD    D: 02/14/2020 9:40:50       T: 02/14/2020 9:51:06     [id number]  Job#: [id number]     Doc#: [id number]    CC:

## 2020-02-14 NOTE — PROGRESS NOTES
Trauma Tertiary Survey    Admit Date: 2/13/2020  Hospital day 1    CC:  Mech fall, recurrent       Past Medical History:   Diagnosis Date    [facility]     Arrhythmia atrial     Bradycardia     CAD (coronary artery disease)     [facility]ip[facility]is [facility]     Hypertension     Moderate aortic stenosis 08/05/2019       Alcohol pre-screening:  Men: How many times in the past year have you had 5 or more drinks in a day?  none      Scheduled Meds:   levetiracetam  500 mg Intravenous Q12H     Continuous Infusions:   niCARdipine 2.5 mg/hr (02/14/20 0530)     PRN Meds:hydrALAZINE, labetalol    Subjective:     Wants coffee this AM, denies pain, no nausea, vomiting, fever, chills or confusion. Objective:     Patient Vitals for the past 8 hrs:   BP Temp Temp src Pulse Resp SpO2 Height Weight   02/14/20 0600 (!) 156/70 -- -- 75 20 96 % -- --   02/14/20 0500 (!) 163/101 -- -- 78 21 94 % -- --   02/14/20 0400 135/64 -- -- 69 17 93 % -- --   02/14/20 0300 (!) 151/68 -- -- 75 18 94 % -- --   02/14/20 0248 -- -- -- -- -- -- 5' 7\" (1.702 m) 127 lb 13.9 oz (58 kg)   02/14/20 0240 (!) 168/74 97.4 °F (36.3 °C) Oral 82 22 97 % -- --   02/14/20 0216 (!) 165/83 -- -- 64 19 -- -- --   02/14/20 0146 (!) 164/73 -- -- 65 19 -- -- --   02/14/20 0131 (!) 175/77 -- -- 60 20 97 % -- --   02/14/20 0116 (!) 172/70 -- -- 65 20 93 % -- --   02/14/20 0101 (!) 177/74 -- -- 60 18 94 % -- --   02/14/20 0034 (!) 175/64 -- -- 55 26 95 % -- --   02/14/20 0001 (!) 184/65 -- -- (!) 37 19 93 % -- --   02/13/20 2333 (!) 166/79 -- -- 50 13 94 % -- --   02/13/20 2316 (!) 157/64 -- -- (!) 49 18 94 % -- --   02/13/20 2300 139/66 -- -- (!) 44 15 94 % -- --   02/13/20 2255 (!) 138/56 -- -- 50 14 94 % -- --   02/13/20 2246 (!) 133/56 -- -- (!) 37 17 -- -- --   02/13/20 2235 (!) 161/64 -- -- (!) 48 17 -- -- --       I/O last 3 completed shifts:   In: 100 [IV Piggyback:100]  Out: -   I/O this shift:  In: -   Out: 275 [Urine:275]    Past Medical History:   Diagnosis Date    Acute MI [facility])     Arrhythmia atrial     Bradycardia     CAD (coronary artery disease)     [facility]     Hypertension     Moderate aortic stenosis 08/05/2019       Radiology:  CT Head WO Contrast   Final Result   Bilateral mixed density subdural hematomas are unchanged as described above. The moderate mass-effect from the right subdural hematoma is stable. No intraparenchymal hemorrhage. If patient's neurologic status deteriorates recommend correlation with MRI. This report has been electronically signed by Vickie Mcmahon MD.      CT Cervical Spine WO Contrast   Final Result   Severe degenerative changes as described above. No fracture. This report has been electronically signed by Vickie Mcmahon MD.      XR PELVIS (1-2 VIEWS)   Final Result   No evidence of displaced fracture. XR CHEST PORTABLE   Final Result      1. Chronic interstitial changes bilaterally. 2. No focal airspace opacity or pleural effusion. 3. Stable cardiomegaly. CT Head WO Contrast   Final Result   Bilateral subdural hematomas, large on the right side of   mixed density with areas of more recent or acute bleed. Preliminary report given to Dr. Neli Rosales. ER Physician. ALERT:  ABNORMAL REPORT. PHYSICAL EXAM:   GCS:  4 - Opens eyes on own   6 - Follows simple motor commands  5 - Alert and oriented    Pupil size:  Left 4 mm Right 4 mm  Pupil reaction: Yes  Wiggles fingers: Left Yes Right Yes  Hand grasp:   Left normal   Right normal  Wiggles toes: Left Yes    Right Yes  Plantar flexion: Left normal  Right normal    PHYSICAL EXAM  General: No apparent distress, comfortable   HEENT: Trachea midline, no masses, Pupils equal round   Chest: Respiratory effort was normal with no retractions or use of accessory muscles. Cardiovascular: Extremities warm, well perfused,   Abdomen:  Soft and non distended.   No tenderness, guarding, rebound, or rigidity NICU care      Electronically signed by Lucretia Nissen, MD on 2/14/20 at 6:13 AM

## 2020-02-14 NOTE — PROGRESS NOTES
OCCUPATIONAL THERAPY INITIAL EVALUATION      Date:2020  Patient Name: Galina Quiñones  MRN: [id number]  : 3/9/1927  Room: [address] [address]-A    Modified Ellsworth Scale   Score     Description  0             No symptoms  1             No significant disability despite symptoms  2             Slight disability; able to look after own affairs  3             Moderate disability; able to ambulate without assist/ requires assist with ADLs  4             Moderate/Severe disability;requires assist to ambulate/assist with ADLs  5             Severe disability;bedridden/incontinent   6               Score:   4    Evaluating OT: MAYCOL Still/NESTOR  Referring Provider: Tim Macario MD    AM-PAC Daily Activity Raw Score:   Recommended Adaptive Equipment: to be determined     Comments: Based on patient's functional performance as stated above and level of assistance needed prior to admission, this therapist believes that the patient would benefit from further skilled OT following hospital stay in an effort to increase safety, functional independence, and quality of life. Diagnosis: Subdural hematoma (HCC) [S06.5X9A]  Bilateral subdural hematomas (HCC) [S06.5X9A]    Pertinent Medical History: acute MI, atrial arrhythmia, bradycardia, CAD, hemiparesis, HTN, moderate aortic stenosis    Precautions:  Falls, mild [place], bed alarm, R shoulder pain (XR pending at eval, pt states pain with shoulder for months)     Home Living: Pt lives alone in a 1 story with 2 step(s) to enter and 1 rail(s); bed/bath on main floor. Bathroom setup: pt reports he sponge bathes at baseline  Equipment owned: ww    Prior Level of Function: independent with ADLs and assistance with IADLs; using ww for ambulation.  Pt reports nieces come in to assist with food, laundry, shopping, and transportation  Driving: no  Occupation: retired from jewelry store    Pain Level: 5/10 pain in back of neck  Cognition: A&O: 4/4; Follows 2-3 step directions   Memory: F   Sequencing: F   Problem solving: F   Judgement/safety: F   RASS: 0  CAM-ICU: NT     Functional Assessment:   Initial Eval Status  Date: 2/14/20 Treatment Status  Date: STG=LTG (~5-14  days)     Feeding Set up A  simulated     improve self feeding task to independent   Grooming Set up A  To wash face and hands in seated; increased time needed for mild FM impairments    improve grooming/hygiene tasks to SBA while standing at sink    UB Dressing SBA      improve UB dressing to mod I   LB Dressing Mod A      improve LB dressing to min A with AE PRN   Bathing Min A    improve UB/LB bathing to min A   Toileting Min A    improve toileting task and all clothing mgmt to Supervision   Bed Mobility  Supine to sit: Mod A  Sit to supine: Mod A  improve bed mobility to min A in prep for EOB ADL tasks   Functional Transfers Sit to stand: Min A  Stand to sit: Min A  improve all functional transfers to SBA   Functional Mobility Min A ww  For side steps at EOB; pt declining to mobilize further d/t fatigue  improve functional mobility to SBA with ww   Balance Sitting:     Static: supervision    Dynamic: min A  Standing: Min A ww  demo independent dynamic sitting balance EOB during ADL tasks   Endurance/Activity Tolerance F-  demo G- activity tolerance/endurance during 15-20 min ADL task    Visual/  Perceptual Glasses: reading              Hand dominance: R  UE ROM: RUE: R shoulder ~30* AROM, ~90* AAROM    LUE: WFL  Strength: RUE: grossly -2/5 LUE: grossly 4-/5   Strength: WFL  Fine Motor Coordination: WFL    Hearing: mild "[employer]  Sensation: No c/o numbness or tingling  Tone: WNL  Edema: unremarkable    Vitals:  Heart Rate at rest 64 Heart Rate post session 78   SPO2 at rest 97% SPO2 post session 94%   Blood pressure at rest 148/61 Blood Pressure post session 159/72                            Comments: Overall, patient demonstrates mild difficulties with completion of BADLs and IADLs.  Factors contributing to performance deficits  · Assistance/Modification: Min/mod assistance or modifications required to perform tasks. May have comorbidities that affect occupational performance. Assessment of current deficits   Functional mobility [x]  ADLs [x] Strength [x]  Cognition [x]  Functional transfers  [x] IADLs [x] Safety Awareness [x]  Endurance [x]  Fine Motor Coordination [x] Balance [x] Vision/perception [] Sensation []   Gross Motor Coordination [] ROM [x] Delirium []                  Motor Control []    Plan of Care: 1-5 tx/wk PRN  ADL retraining [x]   Equipment needs [x]   Neuromuscular re-education [] Energy Conservation Techniques [x]  Functional Transfer training [x] Patient and/or Family Education [x]  Functional Mobility training [x]  Environmental Modifications [x]  Cognitive re-training []   Compensatory techniques for ADLs [x]  Splinting Needs []   Positioning to improve overall function [x]   Therapeutic Activity [x]                       Therapeutic Exercise  [x]  Visual/Perceptual: []    Delirium prevention/treatment  [x]   Other:  []    Rehab Potential: Good for established goals    Patient / Family Goal: None stated     Patient and/or family were instructed/educated on diagnosis, prognosis/goals and plan of care. Demonstrated F understanding, further information likely needed. [] Malnutrition indicators have been identified and nursing has been notified to ensure a dietitian consult is ordered.         (Evaluation time includes thorough review of current medical information, gathering information on past medical history/social history and prior level of function, completion of standardized testing/informal observation of tasks, assessment of data and development of POC/Goals.)    Medium Complexity Evaluation +  Treatment Time In: 0935            Treatment Time Out: 2068               Treatment Charges: Mins Units   Ther Ex  [id number]     Manual Therapy Jean Quan [id number] [phone]   ADL/Home Mgt [id number]     Neuro Re-ed 308 [facility] manage/training  [id number]     Non-Billable Time     Total Timed Treatment 20 Hakeem Cheung OTR/L  [id number]

## 2020-02-14 NOTE — PROGRESS NOTES
SPEECH/LANGUAGE PATHOLOGY  SPEECH/LANGUAGE/COGNITIVE EVALUATION      PATIENT NAME:  Johnathon Callander      :  3/9/1927          TODAY'S DATE:  2020 ROOM:  [address] [place] [address]       ADMITTING DIAGNOSIS: Subdural hematoma (HCC) [S06.5X9A]  Bilateral subdural hematomas (HCC) [S06.5X9A]    SPEECH PATHOLOGY DIAGNOSIS:    Mild-moderate cognitive deficits    THERAPY RECOMMENDATIONS:   Speech Pathology intervention is recommended 3-5 times per week for LOS or when goals are met with emphasis on the following: To improve all areas of memory for functional activities  To improve thought organization   To improve orientation  To improve problem solving   To improve insight                 MOTOR SPEECH       Oral Peripheral Examination   Adequate lingual/labial strength     Parameters of Speech Production  Respiration:  Adequate for speech production  Articulation:  Within functional limits  Resonance:  Within functional limits  Quality:   Within functional limits  Pitch: Within functional limits  Intensity: Within functional limits  Fluency:  Intact  Prosody Intact    RECEPTIVE LANGUAGE    Comprehension of Yes/No Questions:    Within normal limits    Process  Simple Verbal Commands:   Within normal limits  Process Intermediate Verbal Commands:   Within normal limits  Process Complex Verbal Commands:     Incomplete    Comprehension of Conversation:      Within normal limits      EXPRESSIVE LANGUAGE     Serials: Functional    Imitation:  Words   Functional   Sentences Functional    Naming:  (Modality used:  Verbal)  Confrontation Naming  Functional  Functional Description  Functional  Response Naming: Functional    Conversation:      Confusion was noted during conversation    COGNITION     Attention/Orientation  Attention: Easily Distracted  Orientation:  Oriented to Person, Place, Date, Reason for hospitalization    Memory   Immediate Recall: Repeated 3/3    Delayed Recall:   Recalled 1/3     Long Term Recall:   Recalled

## 2020-02-14 NOTE — PROGRESS NOTES
[facility] [address] notified on unit and Left  A message for Dr. Lucio Negrete r/t pt code status. Pt and Laurel CONRAD are requesting a DNR CCA.

## 2020-02-14 NOTE — ED PROVIDER NOTES
Patient is a 27-year-old male with a history of chronic atrial fibrillation, coronary artery disease, hypertension, aortic stenosis presenting for reported alteration in mental status. Patient is provided by both the patient as well as his 2 nieces who are present. States that he he has had multiple falls from bed, most recently on Monday as well as today. Is anticoagulated with Coumadin for his atrial fibrillation. They note he has been having intermittent confusion. Patient currently notes he has a severe headache as well as intermittent nausea with vomiting. Symptoms currently moderate in severity, worsened by nothing and improvement nothing. Denies fever, chills, neck pain, chest pain, shortness of breath, back pain, flank pain, abdominal pain, dysuria, frequency, wounds. Review of Systems   Constitutional: Negative for chills, diaphoresis and fever. HENT: Negative for ear pain, rhinorrhea and sore throat. Eyes: Negative for pain and visual disturbance. Respiratory: Negative for cough, chest tightness and shortness of breath. Cardiovascular: Negative for chest pain, palpitations and leg swelling. Gastrointestinal: Positive for nausea and vomiting. Negative for abdominal pain, blood in stool and diarrhea. Genitourinary: Negative for dysuria, flank pain, frequency and urgency. Musculoskeletal: Negative for back pain and neck pain. Skin: Negative for color change, rash and wound. Neurological: Positive for headaches. Negative for dizziness, syncope, weakness and light-headedness. Physical Exam  Vitals signs and nursing note reviewed. Constitutional:       General: He is not in acute distress. Appearance: Normal appearance. He is well-developed. He is not diaphoretic. HENT:      Head: Normocephalic and atraumatic. Eyes:      General: No scleral icterus. Pupils: Pupils are equal, round, and reactive to light.    Neck:      Musculoskeletal: Normal range of motion and neck supple. No neck rigidity. Vascular: No JVD. Cardiovascular:      Rate and Rhythm: Regular rhythm. Bradycardia present. Heart sounds: S1 normal and S2 normal. Murmur present. Pulmonary:      Effort: Pulmonary effort is normal. No accessory muscle usage or respiratory distress. Breath sounds: Normal breath sounds. No wheezing, rhonchi or rales. Abdominal:      General: Bowel sounds are normal. There is no distension. Palpations: Abdomen is soft. Tenderness: There is no abdominal tenderness. Musculoskeletal: Normal range of motion. Comments: No midline neck or back tenderness. Lymphadenopathy:      Cervical: No cervical adenopathy. Skin:     General: Skin is warm and dry. Coloration: Skin is not pale. Findings: No rash. Neurological:      Mental Status: He is alert and oriented to person, place, and time. 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 Sensory: No sensory deficit. Motor: No weakness. Deep Tendon Reflexes: Reflexes normal.          Procedures     ED Course as of Feb 13 2334   Thu Feb 13, 2020 1941 Spoke with Dr. Gucci Singh, recommended -160 and trauma consult. [RU]   2219 Trauma services recommended medical admission. No neuro ICU beds available, no SICU beds available. Spoke with Dr. Larry Vasquez, accepted for MICU admission. [RU]   [phone] Spoke with Dr. Rosas Kapoor, accepted for admission. [RU]   2306 Patient reassessed, no complaints at this time. [RU]      ED Course User Index  [RU] Pamela Haas DO      --------------------------------------------- PAST HISTORY ---------------------------------------------  Past Medical History:  has a past medical history of Acute MI ([facility] [address] Arrhythmia atrial, Bradycardia, CAD (coronary artery disease), Hemiparesis ([facility] [address] Hypertension, and Moderate aortic stenosis.     Past Surgical History:  has a past surgical history that includes Coronary artery bypass graft; vascular surgery; and Leg Surgery. Social History:  reports that he quit smoking about 33 years ago. His smoking use included cigarettes. He started smoking about 75 years ago. He has a 21.00 pack-year smoking history. He has never used smokeless tobacco. He reports that he does not drink alcohol or use drugs. Family History: family history is not on file. The patients home medications have been reviewed.     Allergies: Lipitor    -------------------------------------------------- RESULTS -------------------------------------------------    Lab  Results for orders placed or performed during the hospital encounter of 02/13/20   CBC Auto Differential   Result Value Ref Range    WBC 5.2 4.5 - 11.5 E9/L    RBC 4.29 3.80 - 5.80 E12/L    Hemoglobin 10.8 (L) 12.5 - 16.5 g/dL    Hematocrit 35.9 (L) 37.0 - 54.0 %    MCV 83.7 80.0 - 99.9 fL    MCH 25.2 (L) 26.0 - 35.0 pg    MCHC 30.1 (L) 32.0 - 34.5 %    RDW 14.6 11.5 - 15.0 fL    Platelets 316 767 - 292 E9/L    MPV 10.1 7.0 - 12.0 fL    Neutrophils % 78.5 43.0 - 80.0 %    Immature Granulocytes % 0.6 0.0 - 5.0 %    Lymphocytes % 11.3 (L) 20.0 - 42.0 %    Monocytes % 7.5 2.0 - 12.0 %    Eosinophils % 1.7 0.0 - 6.0 %    Basophils % 0.4 0.0 - 2.0 %    Neutrophils Absolute 4.08 1.80 - 7.30 E9/L    Immature Granulocytes # 0.03 E9/L    Lymphocytes Absolute 0.59 (L) 1.50 - 4.00 E9/L    Monocytes Absolute 0.39 0.10 - 0.95 E9/L    Eosinophils Absolute 0.09 0.05 - 0.50 E9/L    Basophils Absolute 0.02 0.00 - 0.20 E9/L    Poikilocytes 1+     Ovalocytes 1+    Comprehensive Metabolic Panel w/ Reflex to MG   Result Value Ref Range    Sodium 137 132 - 146 mmol/L    Potassium reflex Magnesium 4.8 3.5 - 5.0 mmol/L    Chloride 102 98 - 107 mmol/L    CO2 25 22 - 29 mmol/L    Anion Gap 10 7 - 16 mmol/L    Glucose 128 (H) 74 - 99 mg/dL    BUN 55 (H) 8 - 23 mg/dL    CREATININE 2.8 (H) 0.7 - 1.2 mg/dL    GFR Non-African American 21 >=60 mL/min/1.73    GFR African American 26     Calcium 8.4 (L) 8.6 - 10.2 mg/dL    Total Protein 6.7 6.4 - 8.3 g/dL    Alb 3.5 3.5 - 5.2 g/dL    Total Bilirubin 0.4 0.0 - 1.2 mg/dL    Alkaline Phosphatase 85 40 - 129 U/L    ALT 11 0 - 40 U/L    AST 20 0 - 39 U/L   Urinalysis with Microscopic   Result Value Ref Range    Color, UA Yellow Straw/Yellow    Clarity, UA Clear Clear    Glucose, Ur Negative Negative mg/dL    Bilirubin Urine Negative Negative    Ketones, Urine Negative Negative mg/dL    Specific Gravity, UA 1.020 1.005 - 1.030    Blood, Urine Negative Negative    pH, UA 6.0 5.0 - 9.0    Protein,  (A) Negative mg/dL    Urobilinogen, Urine 0.2 <2.0 E.U./dL    Nitrite, Urine Negative Negative    Leukocyte Esterase, Urine Negative Negative    WBC, UA 0-1 0 - 5 /HPF    RBC, UA 0-1 0 - 2 /HPF    Bacteria, UA NONE SEEN None Seen /HPF   Troponin   Result Value Ref Range    Troponin 0.05 (H) 0.00 - 0.03 ng/mL   Brain Natriuretic Peptide   Result Value Ref Range    Pro-BNP 2,941 (H) 0 - 450 pg/mL   Lactic Acid, Plasma   Result Value Ref Range    Lactic Acid 1.6 0.5 - 2.2 mmol/L   Protime-INR   Result Value Ref Range    Protime 19.7 (H) 9.3 - 12.4 sec    INR 1.7    APTT   Result Value Ref Range    aPTT 36.7 (H) 24.5 - 35.1 sec   TSH without Reflex   Result Value Ref Range    TSH 1.870 0.270 - 4.200 uIU/mL   Serum Drug Screen   Result Value Ref Range    Ethanol Lvl <10 mg/dL    Acetaminophen Level <5.0 (L) 10.0 - 86.0 mcg/mL    Salicylate, Serum <2.2 0.0 - 30.0 mg/dL    TCA Scrn NEGATIVE Cutoff:300 ng/mL   Urine Drug Screen   Result Value Ref Range    Amphetamine Screen, Urine NOT DETECTED Negative <1000 ng/mL    Barbiturate Screen, Ur NOT DETECTED Negative < 200 ng/mL    Benzodiazepine Screen, Urine NOT DETECTED Negative < 200 ng/mL    Cannabinoid Scrn, Ur NOT DETECTED Negative < 50ng/mL    Cocaine Metabolite Screen, Urine NOT DETECTED Negative < 300 ng/mL    Opiate Scrn, Ur NOT DETECTED Negative < 300ng/mL    PCP 02/13/20 2102 (!) 149/63 -- (!) 39 16 95 % --   02/13/20 2046 (!) 140/57 -- (!) 47 16 94 % --   02/13/20 2036 (!) 117/52 -- (!) 41 13 95 % --   02/13/20 2032 (!) 137/49 -- (!) 49 14 93 % --   02/13/20 2023 (!) 184/59 -- (!) 41 16 94 % --   02/13/20 2017 (!) 175/74 -- 61 15 95 % --   02/13/20 1935 (!) 155/50 -- (!) 42 17 95 % --   02/13/20 1837 (!) 178/66 -- -- -- -- --   02/13/20 1836 -- 97 °F (36.1 °C) (!) 44 15 95 % 129 lb (58.5 kg)       Oxygen Saturation Interpretation: Normal    --------------------------------------- ED Clinical Course/MDM --------------------------------------    Patient is a 44-year-old male presenting for reported alteration in mental status, multiple falls. Is chronically anticoagulated on Coumadin. Found to have bilateral subdural hematomas. Discussed with neurosurgery, trauma services. INR reversed with [facility], vitamin K. Patient is currently alert and oriented to questions with no focal deficits that are new and appreciable. Goal of blood pressure systolic between 571 and 922 per neurosurgery recommendations. Started on Cardene drip for maintenance of blood pressure. Also did have  atrial fibrillation with slow ventricular response, however, no indication for additional rate control at this time. No neuro or surgical intensive beds available, discussed with medical ICU and was accepted for admission. 11:35 PM  I have spoken with the patient and discussed todays results, in addition to providing specific details for the plan of care and counseling regarding the diagnosis and prognosis.   Their questions are answered at this time and they are agreeable with the plan.    --------------------------------- ADDITIONAL PROVIDER NOTES ---------------------------------  This patient's ED course included: a personal history and physicial examination, re-evaluation prior to disposition, multiple bedside re-evaluations, IV medications, cardiac monitoring, continuous pulse oximetry

## 2020-02-14 NOTE — PROGRESS NOTES
acute issues. Monitor respiratory status. GI: Diet. Monitor bowel function. Renal:  No acute issues. CKD 4. Baseline 2.3-2.9mg/dL. [place]. Monitor uop, renal function and electrolytes  ID: No acute issues. Afebrile. Endocrine:   Hyperglycemia. Follow labs   MSK:. Deconditioned. PT/OT  Heme: Anemia. Monitor CBC        Bowel regime: Colace  Pain control/Sedation:  Acetaminophen  DVT prophylaxis: SCDs. Consults:  Medicine. Neurosurgery. Patient/Family update: Will update as family available  Code status:  Discussed status with patient. He wishes to be DNR CCA. [facility]         MANDEEP Burrows  2/14/2020  7:22 AM

## 2020-02-14 NOTE — PROGRESS NOTES
C collar cleared:  AOx3, sober, and not distracted  No tenderness midline cspine  No pain/paresthesia on full AROM    Electronically signed by Chani Curry MD on 2/14/2020 at 1:38 AM

## 2020-02-14 NOTE — PLAN OF CARE
Problem: Risk for Impaired Skin Integrity  Goal: Tissue integrity - skin and mucous membranes  Description  Structural intactness and normal physiological function of skin and  mucous membranes.   2/14/2020 1621 by Priya Mckeon RN  Outcome: Met This Shift  2/14/2020 0755 by Jeff Gaffney RN  Outcome: Met This Shift     Problem: Falls - Risk of:  Goal: Will remain free from falls  Description  Will remain free from falls  2/14/2020 1621 by Priya Mckeon RN  Outcome: Met This Shift  2/14/2020 0755 by Jeff Gaffney RN  Outcome: Met This Shift  Goal: Absence of physical injury  Description  Absence of physical injury  2/14/2020 1621 by Priya Mckeon RN  Outcome: Met This Shift  2/14/2020 0755 by Jeff Gaffney RN  Outcome: Met This Shift     Problem: Coping:  Goal: Ability to remain calm will improve  Description  Ability to remain calm will improve  2/14/2020 1621 by Priya Mckeon RN  Outcome: Met This Shift  2/14/2020 0755 by Jeff Gaffney RN  Outcome: Met This Shift     Problem: Safety:  Goal: Ability to remain free from injury will improve  Description  Ability to remain free from injury will improve  2/14/2020 1621 by Priya Mckeon RN  Outcome: Met This Shift  2/14/2020 0755 by Jeff Gaffney RN  Outcome: Met This Shift     Problem: Self-Care:  Goal: Ability to participate in self-care as condition permits will improve  Description  Ability to participate in self-care as condition permits will improve  2/14/2020 1621 by Priya Mckeon RN  Outcome: Met This Shift  2/14/2020 0755 by Jeff Gaffney RN  Outcome: Met This Shift     Problem: Neurological  Goal: Maximum potential motor/sensory/cognitive function  Outcome: Met This Shift

## 2020-02-14 NOTE — PROGRESS NOTES
Hb   Cardiac: Monitor Hemodynamics, Cardene off Goal SBP < 160 , Home lisinopril, imdur, Hold coumadin received Kycentra   Endocrine: No Glycemic Issues    DVT Prophylaxis: PCDs, Hold Chemoprophylaxis until  cleared by Neurosurgery    Ulcer Prophylaxis: No Ulcer Prophylaxis Indicated   Tubes and Lines: Continue PIV   Seizure proph:     Keppra  Ancillary consults:   Internal Medicine , Neurosurgery and PT/OT  palliative care   Family Update:         As available   CODE Status:       DNR-CCA     Dispo: OK for  Transfer to  Mary Duff MD

## 2020-02-14 NOTE — H&P
Historical Provider, MD       Allergies:   Lipitor    Social History:   Tobacco use:  no  Alcohol use:  no  Illicit drug use:  no    Past Surgical History:    Past Surgical History:   Procedure Laterality Date    CORONARY ARTERY BYPASS GRAFT      LEG SURGERY      VASCULAR SURGERY         Anticoagulant use:  coumadin  Antiplatelet use:    no    NSAID use in last 72 hours: no  Taken PCN in past: ?  Last food/drink: ? Last tetanus: ?    Family History:   Not pertinent to presenting problem. Complaints:   Head: Moderate  Neck:   Moderate  Chest:   None  Back:   None  Abdomen:   None  Extremities:   None  Comments:     Review of systems:  All negative unless otherwise noted. SECONDARY SURVEY  Head/scalp: Atraumatic    Face: Atraumatic    Eyes/ears/nose: Atraumatic    Pharynx/mouth: Atraumatic    Neck: Atraumatic     Cervical spine: Cervical collar placed on patient at time of arrival  Pain:  Yes  Tenderness: none  ROM:  Not indicated        Chest wall:  Atraumatic  Tenderness: none    Heart: Other bradycardic to the 40's yet hypertensive    Abdomen: Atraumatic. Soft ND  Tenderness:  none    Pelvis: Atraumatic  Tenderness: none    Thoracolumbar spine: Atraumatic  Tenderness:  none    Genitourinary:  Atraumatic. No blood or urine noted    Rectum: deferred        Perineum: Atraumatic. No blood or urine noted. Extremities: small abrasion on right anterior knee  Sensory normal  Motor normal    Distal Pulses   Left arm normal  Right arm normal  Left leg normal  Right leg normal    Capillary refill  Left arm normal  Right arm normal  Left leg normal  Right leg normal    Procedures in ED:  none        In the event of Emergency Blood Transfusion:  Due to the critical condition of this patient, I request the immediate release of blood products for emergency transfusion secondary to shock. I understand the increased risks incurred by the lack of complete transfusion testing.       Radiology: Chest Xray & Pelvic Xray negative for acute injury. Ct head with bilateral SDH (repeat CT pending), Ct cervical spine pending    Consultations:  Neurosurgery. Admitting to MICU. Trauma as consultants. Admission/Diagnosis: SDH (mainly chronic, minor acute component), bradycardia & HTN requiring nicardipine drip, CKD    Plan of Treatment: Anticipate admission to MICU. From trauma standpoint will follow up repeat head CT with Cspine CT, neurochecks, Keppra, f/u INR (already received [facility] and 10 of IV vitK in ED), neurosurgery recommendations, hold coumadin and other thinners.     Plan discussed with Dr. Lg Smith at 2/13/2020 on 7:48 PM    Electronically signed by Catina García MD on 2/13/2020 at 7:48 PM

## 2020-02-14 NOTE — PROGRESS NOTES
Pt alert and oriented. Pt stated that he does not want to have SDH evacuation on Monday. Told him it was entirely his choice.

## 2020-02-15 NOTE — PROGRESS NOTES
Neurosurg progress note  VITALS:  /60   Pulse 92   Temp 97.6 °F (36.4 °C)   Resp 18   Ht 5' 7\" (1.702 m)   Wt 127 lb 13.9 oz (58 kg)   SpO2 95%   BMI 20.03 kg/m²   24HR INTAKE/OUTPUT:    Intake/Output Summary (Last 24 hours) at 2/15/2020 1118  Last data filed at 2/15/2020 1029  Gross per 24 hour   Intake 420 ml   Output 875 ml   Net -455 ml     Xr Pelvis (1-2 Views)    Result Date: 2020  Patient MRN:  [id number] : 3/9/1927 Age: 80 years Gender: Male Order Date:  2020 8:30 PM EXAM: XR PELVIS (1-2 VIEWS) NUMBER OF IMAGES: views INDICATION:  trauma trauma COMPARISON: None The bones appear to be in anatomic alignment. No evidence of displaced fracture. Multiple clips overlie the groin and lower abdomen. No evidence of displaced fracture. Xr Shoulder Right (min 2 Views)    Result Date: 2020  Patient MRN:  [id number] : 3/9/1927 Age: 80 years Gender: Male Order Date:  2020 6:30 AM EXAM: XR SHOULDER RIGHT (MIN 2 VIEWS) NUMBER OF IMAGES:  5 INDICATION:  R shoulder pain, h/o multiple falls COMPARISON: Right shoulder radiographs were 2020 RESULT: No fracture or dislocation. Superior subluxation of the humeral head with narrowing of the acromiohumeral interval compatible with chronic full-thickness rotator cuff tear. Moderate to severe degenerative changes of the acromioclavicular joint. Mild degenerative changes of the glenohumeral joint. Osteopenia. 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 Status post median sternotomy with a fracture sternal wire, unchanged since prior exam.     No acute osseous abnormality. Findings compatible with chronic full-thickness rotator cuff tear. 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     Ct Head Wo Contrast    Result Date: 2020  PROCEDURE INFORMATION: Exam: CT Head Without Contrast Exam date and time: 2020 11:00 PM Age: 80years old Clinical indication: Other: Intercranial hemmorrhage; Additional info: Follow up bilateral acute on chronic subdural hematomas TECHNIQUE: Imaging protocol: Computed tomography of the head without contrast. Radiation optimization: All CT scans at this facility use at least one of these dose optimization techniques: automated exposure control; mA and/or kV adjustment per patient size (includes targeted exams where dose is matched to clinical indication); or iterative reconstruction. COMPARISON: CT HEAD WO CONTRAST 2020 7:16 PM FINDINGS: Brain: Age consistent cerebral and cerebellar atrophy. Age consistent periventricular white matter abnormality. Bilateral mixed density subdural hematomas again noted. Right-sided subdural hematoma measures up to 2.0 cm in thickness. This measurement was performed on the coronal views. No significant change of the amount or characteristic of blood within the right subdural space. Moderate mass effect is unchanged. The smaller mixed density left subdural hematoma is also unchanged and is also best evaluated on the coronal images. No mass effect on the left side subdural hematoma. No intraparenchymal hemorrhage. Ventricles: Normal. No ventriculomegaly. Bones/joints: Normal. Sinuses: Visualized sinuses are unremarkable. No fluid levels. Mastoid air cells: Visualized mastoid air cells are well aerated. Soft tissues: Unremarkable. Report of prior examination is not available. This is usually provided. Bilateral mixed density subdural hematomas are unchanged as described above. The moderate mass-effect from the right subdural hematoma is stable. No intraparenchymal hemorrhage. If patient's neurologic status deteriorates recommend correlation with MRI.  This report has been electronically signed by Shawn Chapa MD.    Ct Head Wo Contrast    Result Date: 2020  Patient MRN:  [id number] : 3/9/1927 Age: 80 years Gender: Male Order Date:  2020 7:00 PM TECHNIQUE/NUMBER OF IMAGES/COMPARISON/CLINICAL HISTORY: CT brain Axial images were obtained sagittal and coronal reconstructions 153 images Comparison January 12 80year-old male patient history intracranial hemorrhage. FINDINGS: Presence of bilateral subdural hemorrhages. The they have a mixed density area with areas of isointensity to the brain parenchyma and hyperdensity in some areas of hypodensity as well. The they tend to form a CSF fluid level bilaterally. The subdural hematoma is more prominent on the right side measuring up to 15 mm. On the left side measures up to 5 mm are. The they are new development since the previous examination October 10 6. Midline shift at the level of the anterior septal pellucidum is to the left of approximately 2 mm are. There is no indication for intraparenchymal hemorrhage. There is no intraventricular hemorrhage. There is no monique subarachnoid hemorrhage. There is no evidence for a sizable area of an acute or recent insult in progression to the brain parenchyma. The discrete hypodensities seen in the white matter can be relate with chronic small vessel changes in the were seen previously. Images with bone window settings demonstrate no significant findings     Bilateral subdural hematomas, large on the right side of mixed density with areas of more recent or acute bleed. Preliminary report given to Dr. Neli Rosales. ER Physician. ALERT:  ABNORMAL REPORT. Ct Cervical Spine Wo Contrast    Result Date: 2/14/2020  PROCEDURE INFORMATION: Exam: CT Cervical Spine Without Contrast Exam date and time: 2/13/2020 11:00 PM Age: 80years old Clinical indication: Injury or trauma; Fall;  Follow-up exam; Bleeding/hemorrhage TECHNIQUE: Imaging protocol: Computed tomography images of the cervical spine without contrast. Radiation optimization: All CT scans at this facility use at least one of these dose optimization techniques: automated exposure control; mA and/or kV adjustment per patient size (includes targeted exams where dose is matched to clinical indication); or iterative reconstruction. COMPARISON: CT CERVICAL SPINE WO CONTRAST 2020 9:48 AM FINDINGS: Vertebrae: No vertebral fracture. Mild spinal canal stenosis secondary to the chronic degenerative changes. Discs/Spinal canal/Neural foramina: Severe Degenerative change with osteophyte formation and disc space narrowing from the C2-C3 level through the C6-C7 level. Multilevel facet arthropathy noted. Soft tissues: Partial calcification of the nuchal ligament. This is of no clinical significance. Lungs: There is biapical pleural thickening consistent with chronic inflammation. Vasculature: There is atherosclerotic calcification of the carotid arteries. Severe degenerative changes as described above. No fracture. This report has been electronically signed by Dhaval Price MD.    Xr Chest Portable    Result Date: 2020  Patient MRN:  [id number] : 3/9/1927 Age: 80 years Gender: Male Order Date:  2020 7:00 PM EXAM: XR CHEST PORTABLE COMPARISON: 2020 INDICATION:  ams, falls ams, falls FINDINGS: There are chronic interstitial changes bilaterally. There is stable mild cardiomegaly. No focal airspace opacity or pleural effusion. There are median sternotomy wires. No pneumothorax. 1. Chronic interstitial changes bilaterally. 2. No focal airspace opacity or pleural effusion. 3. Stable cardiomegaly.     CBC:   Lab Results   Component Value Date    WBC 6.5 02/15/2020    RBC 3.92 02/15/2020    HGB 9.9 02/15/2020    HCT 32.7 02/15/2020    MCV 83.4 02/15/2020    MCH 25.3 02/15/2020    MCHC 30.3 02/15/2020    RDW 14.5 02/15/2020     02/15/2020    MPV 9.6 02/15/2020     BMP:    Lab Results   Component Value Date     02/15/2020    K 4.5 02/15/2020    K 4.5 02/15/2020     02/15/2020    CO2 20 02/15/2020    BUN 44 02/15/2020    LABALBU 3.5 2020    LABALBU 4.2 2012    CREATININE 2.2 02/15/2020    CALCIUM 8.4 02/15/2020    GFRAA 34 02/15/2020    LABGLOM 28 02/15/2020    GLUCOSE 90 02/15/2020    GLUCOSE 120 03/19/2012      tamsulosin  0.4 mg Oral Daily    lisinopril  5 mg Oral Daily    gabapentin  200 mg Oral TID    isosorbide mononitrate  60 mg Oral BID    sodium bicarbonate  650 mg Oral BID    docusate sodium  100 mg Oral Daily    patiromer sorbitex calcium  8.4 g Oral Daily    levETIRAcetam  750 mg Oral BID     Remains neuro intact  Assessment:  Patient Active Problem List   Diagnosis    HTN (hypertension)    CAD (coronary artery disease)    [facility]VD [facility]ipheral vascular [facility] [facility]    RBBB (right bundle branch block with left anterior fascicular block)    Cellulitis of right lower extremity    Acute on chronic renal insufficiency    Cellulitis of right foot    Bradycardia    Essential hypertension    Lumbar spondylosis    Benign prostatic hyperplasia without lower urinary tract symptoms    Stage 4 chronic kidney disease (HCC)    Vitamin D deficiency    Chronic atrial fibrillation    Orthostatic hypotension    Subdural hematoma (HCC)    Bilateral subdural hematomas (HCC)    AKIRA (acute kidney injury) ([facility] [address] [facility]     Plan: For asif hole evac [facility]H Monday Continue current care  Kyle Lane M.D.

## 2020-02-15 NOTE — PROGRESS NOTES
[facility] MD Luisa Regan, MD Reina Tim, MD Edward Matos, CNP   Silvana Melgoza, HAMZAH  Progress Note    2/15/2020    SUBJECTIVE:   2/15: Denies complaint. Good appetite. ROS unremarkable. PROBLEM LIST:    Active Problems:    Subdural hematoma (HCC)    Bilateral subdural hematomas (HCC)    AKIRA (acute kidney injury) ([address]  Resolved Problems:    * No resolved hospital problems.  *         DIET:    DIET GENERAL;     MEDS (scheduled):    tamsulosin  0.4 mg Oral Daily    lisinopril  5 mg Oral Daily    gabapentin  200 mg Oral TID    isosorbide mononitrate  60 mg Oral BID    sodium bicarbonate  650 mg Oral BID    docusate sodium  100 mg Oral Daily    patiromer sorbitex calcium  8.4 g Oral Daily    levETIRAcetam  750 mg Oral BID       MEDS (infusions):      MEDS (prn):  acetaminophen, hydrALAZINE, labetalol    PHYSICAL EXAM:     Patient Vitals for the past 24 hrs:   BP Temp Temp src Pulse Resp SpO2   02/15/20 0731 131/60 97.6 °F (36.4 °C) -- 92 18 95 %   02/14/20 2315 (!) 169/64 98.4 °F (36.9 °C) Temporal 62 18 96 %   02/14/20 1901 (!) 132/51 98.5 °F (36.9 °C) Temporal 61 18 96 %   02/14/20 1700 (!) 163/86 -- -- 51 17 (!) 87 %   @      Intake/Output Summary (Last 24 hours) at 2/15/2020 1606  Last data filed at 2/15/2020 1029  Gross per 24 hour   Intake 180 ml   Output 375 ml   Net -195 ml         Wt Readings from Last 3 Encounters:   02/14/20 127 lb 13.9 oz (58 kg)   02/10/20 129 lb (58.5 kg)   01/20/20 145 lb 1 oz (65.8 kg)       Constitutional:  in no acute distress  Oral: mucus membranes moist  Neck: no JVD  Cardiovascular: S1, S2 regular rhythm, no murmur,or rub  Respiratory:  No crackles, no wheeze  Gastrointestinal:  Soft, nontender, nondistended, NABS  Ext: no edema, feet warm  Skin: dry, no rash  Neuro: awake, alert, interactive      DATA:    Recent Labs     02/13/20  1912 02/14/20  0900 02/15/20  0653   WBC 5.2 9.4 6.5   HGB 10.8* 11.4* 9.9*   HCT 35.9* 36.4* 32.7*   MCV 83.7 82.5 83.4    149 148     Recent Labs     02/13/20  1912 02/14/20  0900 02/15/20  0653    138 140   K 4.8 5.0 4.5  4.5    102 104   CO2 25 23 20*   PHOS  --  2.4*  --    BUN 55* 48* 44*   CREATININE 2.8* 2.3* 2.2*   ALT 11  --   --    AST 20  --   --    BILITOT 0.4  --   --    ALKPHOS 85  --   --        Lab Results   Component Value Date    LABPROT 1.4 (H) 01/18/2020    LABPROT 1.4 01/18/2020    LABPROT 1.5 (H) 01/07/2020    LABPROT 1.5 01/07/2020       ASSESSMENT / RECOMMENDATIONS:    -Chronic kidney disease stage IV baseline cr 2.3-2.9   -B/L subdural hematomas : Mr David Gleason has chosen no surgical intervention  -Multiple falls   -CAD s/p 2 bypasses in the past   -AF   -PVD   -HTN     Creatinine stable at baseline .    He is off cardene drip and BP stable   He is on veltassa as outpatient and might need to consider restarting it if K drft up       Continue supportive care  Continue current management  Follow labs, UO     Electronically signed by Fani Mcmahon MD on 2/15/2020 at 4:06 PM

## 2020-02-15 NOTE — PROGRESS NOTES
General Progress Note  2/15/2020 4:36 PM  Subjective:   Admit Date: 2/13/2020  PCP: Olivier Mahoney DO  Interval History: no acute issues overnight. Covering for . Diet: DIET GENERAL;  Pain is:Mild  Nausea:None  Bowel Movement/Flatus yes    Data:   Scheduled Meds:   tamsulosin  0.4 mg Oral Daily    lisinopril  5 mg Oral Daily    gabapentin  200 mg Oral TID    isosorbide mononitrate  60 mg Oral BID    sodium bicarbonate  650 mg Oral BID    docusate sodium  100 mg Oral Daily    patiromer sorbitex calcium  8.4 g Oral Daily    levETIRAcetam  750 mg Oral BID     Continuous Infusions:  PRN Meds:acetaminophen, hydrALAZINE, labetalol  I/O last 3 completed shifts: In: 180 [P.O.:180]  Out: 375 [Urine:375]  No intake/output data recorded.     Intake/Output Summary (Last 24 hours) at 2/15/2020 1636  Last data filed at 2/15/2020 1029  Gross per 24 hour   Intake 180 ml   Output 375 ml   Net -195 ml       CBC with Differential:    Lab Results   Component Value Date    WBC 6.5 02/15/2020    RBC 3.92 02/15/2020    HGB 9.9 02/15/2020    HCT 32.7 02/15/2020     02/15/2020    MCV 83.4 02/15/2020    MCH 25.3 02/15/2020    MCHC 30.3 02/15/2020    RDW 14.5 02/15/2020    SEGSPCT 63 03/19/2012    LYMPHOPCT 13.3 02/15/2020    MONOPCT 11.0 02/15/2020    BASOPCT 0.2 02/15/2020    MONOSABS 0.72 02/15/2020    LYMPHSABS 0.87 02/15/2020    EOSABS 0.22 02/15/2020    BASOSABS 0.01 02/15/2020     CMP:    Lab Results   Component Value Date     02/15/2020    K 4.5 02/15/2020    K 4.5 02/15/2020     02/15/2020    CO2 20 02/15/2020    BUN 44 02/15/2020    CREATININE 2.2 02/15/2020    GFRAA 34 02/15/2020    LABGLOM 28 02/15/2020    GLUCOSE 90 02/15/2020    GLUCOSE 120 03/19/2012    PROT 6.7 02/13/2020    LABALBU 3.5 02/13/2020    LABALBU 4.2 03/19/2012    CALCIUM 8.4 02/15/2020    BILITOT 0.4 02/13/2020    ALKPHOS 85 02/13/2020    AST 20 02/13/2020    ALT 11 02/13/2020     Magnesium:    Lab Results   Component Value appropriate    Assessment:   Active Problems:    Subdural hematoma (HCC)    Bilateral subdural hematomas (HCC)    AKIRA (acute kidney injury) ([facility] [address]  Resolved Problems:    * No resolved hospital problems. *    Plan:   1. Continue present care.     [address] [place] [address] [address], D.O.  4:36 PM  2/15/2020

## 2020-02-15 NOTE — CONSULTS
Department of Orthopedic Surgery  Resident Consult Note          CHIEF COMPLAINT: Right shoulder pain    HISTORY OF PRESENT ILLNESS:                The patient is a 80 y.o. male who presents with chronic right shoulder pain that is been bothering him for years. No recent falls or traumas. Patient is right-hand dominant. Denies any numbness paresthesias. Denies any other orthopedic complaints at this time. Past Medical History:        Diagnosis Date    Acute MI ([address]     Arrhythmia atrial     Bradycardia     CAD (coronary artery disease)     Hemiparesis (HCC)     Hypertension     Moderate aortic stenosis 08/05/2019     Past Surgical History:        Procedure Laterality Date    CORONARY ARTERY BYPASS GRAFT      LEG SURGERY      VASCULAR SURGERY       Current Medications:   Current Facility-Administered Medications: tamsulosin (FLOMAX) capsule 0.4 mg, 0.4 mg, Oral, Daily  lisinopril (PRINIVIL;ZESTRIL) tablet 5 mg, 5 mg, Oral, Daily  gabapentin (NEURONTIN) capsule 200 mg, 200 mg, Oral, TID  isosorbide mononitrate (IMDUR) extended release tablet 60 mg, 60 mg, Oral, BID  sodium bicarbonate tablet 650 mg, 650 mg, Oral, BID  docusate sodium (COLACE) capsule 100 mg, 100 mg, Oral, Daily  acetaminophen (TYLENOL) tablet 650 mg, 650 mg, Oral, Q4H PRN  patiromer sorbitex calcium (VELTASSA) packet 8.4 g, 8.4 g, Oral, Daily  levETIRAcetam (KEPPRA) tablet 750 mg, 750 mg, Oral, BID  hydrALAZINE (APRESOLINE) injection 10 mg, 10 mg, Intravenous, Q4H PRN  labetalol (NORMODYNE;TRANDATE) injection 10 mg, 10 mg, Intravenous, Q4H PRN  Allergies:  Lipitor    Social History:   TOBACCO:   reports that he quit smoking about 33 years ago. His smoking use included cigarettes. He started smoking about 75 years ago. He has a 21.00 pack-year smoking history. He has never used smokeless tobacco.  ETOH:   reports no history of alcohol use. DRUGS:   reports no history of drug use.   ACTIVITIES OF DAILY LIVING:    OCCUPATION:    Family History:   History reviewed. No pertinent family history. General ROS: negative  Cardiovascular ROS: no chest pain or dyspnea on exertion  Respiratory ROS: no cough, shortness of breath, or wheezing  Gastrointestinal ROS: no abdominal pain, change in bowel habits, or black or bloody stools  Neurological ROS: no TIA or stroke symptoms  Musculoskeletal ROS: Right shoulder pain    PHYSICAL EXAM:    VITALS:  BP (!) 169/64   Pulse 62   Temp 98.4 °F (36.9 °C) (Temporal)   Resp 18   Ht 5' 7\" (1.702 m)   Wt 127 lb 13.9 oz (58 kg)   SpO2 96%   BMI 20.03 kg/m²   CONSTITUTIONAL:  awake, alert, cooperative, no apparent distress, and appears stated age  MUSCULOSKELETAL:  RUE  · Skin intact  · Crepitance with range of motion  · Plus radial pulse  · Sensation intact light touch to median, ulnar, radial, axillary nerves  · Positive AIN, PIN, ulnar nerve function  · No significant tenderness palpation about the shoulder elbow wrist  · Patient has weakness with abduction and forward flexion compared to the contralateral side.   He does have some discomfort with cross body motions      SECONDARY EXAM    LUE: skin intact, -TTP, Radial pulses +2, cap refill <2 seconds, +sensation to radial/ulnar/median nerves sensation, +motor to AIN/PIN/ulnar nerves, compartments soft and compressible    RLE: skin intack, -TTP, DP/PT pulses +2, cap refill < 2 seconds, + sensation to sp/dp/pt/s/s nerves intact, demonstrates active plantar and dorsiflexion of ankle, compartments soft and compressible    LLE:skin intack, -TTP, DP/PT pulses +2, cap refill < 2 seconds, + sensation to sp/dp/pt/s/s nerves intact, demonstrates active plantar and dorsiflexion of ankle, compartments soft and compressible        DATA:    CBC:   Lab Results   Component Value Date    WBC 9.4 02/14/2020    RBC 4.41 02/14/2020    HGB 11.4 02/14/2020    HCT 36.4 02/14/2020    MCV 82.5 02/14/2020    MCH 25.9 02/14/2020    MCHC 31.3 02/14/2020    RDW 14.5 02/14/2020

## 2020-02-16 NOTE — PLAN OF CARE
Problem: Risk for Impaired Skin Integrity  Goal: Tissue integrity - skin and mucous membranes  Description  Structural intactness and normal physiological function of skin and  mucous membranes.   Outcome: Met This Shift     Problem: Falls - Risk of:  Goal: Will remain free from falls  Description  Will remain free from falls  Outcome: Met This Shift  Goal: Absence of physical injury  Description  Absence of physical injury  Outcome: Met This Shift     Problem: Coping:  Goal: Ability to remain calm will improve  Description  Ability to remain calm will improve  Outcome: Met This Shift     Problem: Safety:  Goal: Ability to remain free from injury will improve  Description  Ability to remain free from injury will improve  Outcome: Met This Shift     Problem: Self-Care:  Goal: Ability to participate in self-care as condition permits will improve  Description  Ability to participate in self-care as condition permits will improve  Outcome: Met This Shift     Problem: Neurological  Goal: Maximum potential motor/sensory/cognitive function  Outcome: Met This Shift

## 2020-02-16 NOTE — PROGRESS NOTES
General Progress Note  2/16/2020 8:32 AM  Subjective:   Admit Date: 2/13/2020  PCP: Brett Mahoney DO  Interval History: no acute issues overnight. [facility] tomorrow, per Neurosurgery. Diet: DIET GENERAL;  Pain is:None  Nausea:None  Bowel Movement/Flatus yes    Data:   Scheduled Meds:   tamsulosin  0.4 mg Oral Daily    lisinopril  5 mg Oral Daily    gabapentin  200 mg Oral TID    isosorbide mononitrate  60 mg Oral BID    sodium bicarbonate  650 mg Oral BID    docusate sodium  100 mg Oral Daily    patiromer sorbitex calcium  8.4 g Oral Daily    levETIRAcetam  750 mg Oral BID     Continuous Infusions:  PRN Meds:acetaminophen, hydrALAZINE, labetalol  I/O last 3 completed shifts: In: 180 [P.O.:180]  Out: 400 [Urine:400]  No intake/output data recorded.     Intake/Output Summary (Last 24 hours) at 2/16/2020 0832  Last data filed at 2/15/2020 2258  Gross per 24 hour   Intake 180 ml   Output 400 ml   Net -220 ml       CBC with Differential:    Lab Results   Component Value Date    WBC 6.5 02/15/2020    RBC 3.92 02/15/2020    HGB 9.9 02/15/2020    HCT 32.7 02/15/2020     02/15/2020    MCV 83.4 02/15/2020    MCH 25.3 02/15/2020    MCHC 30.3 02/15/2020    RDW 14.5 02/15/2020    SEGSPCT 63 03/19/2012    LYMPHOPCT 13.3 02/15/2020    MONOPCT 11.0 02/15/2020    BASOPCT 0.2 02/15/2020    MONOSABS 0.72 02/15/2020    LYMPHSABS 0.87 02/15/2020    EOSABS 0.22 02/15/2020    BASOSABS 0.01 02/15/2020     CMP:    Lab Results   Component Value Date     02/16/2020    K 5.0 02/16/2020    K 4.5 02/15/2020     02/16/2020    CO2 23 02/16/2020    BUN 47 02/16/2020    CREATININE 2.4 02/16/2020    GFRAA 31 02/16/2020    LABGLOM 25 02/16/2020    GLUCOSE 92 02/16/2020    GLUCOSE 120 03/19/2012    PROT 6.7 02/13/2020    LABALBU 3.5 02/13/2020    LABALBU 4.2 03/19/2012    CALCIUM 8.5 02/16/2020    BILITOT 0.4 02/13/2020    ALKPHOS 85 02/13/2020    AST 20 02/13/2020    ALT 11 02/13/2020 Magnesium:    Lab Results   Component Value Date    MG 2.0 02/16/2020     Phosphorus:    Lab Results   Component Value Date    PHOS 3.3 02/16/2020     PT/INR:    Lab Results   Component Value Date    PROTIME 12.4 02/16/2020    PROTIME 16.8 04/16/2012    INR 1.1 02/16/2020     Last 3 Troponin:    Lab Results   Component Value Date    TROPONINI 0.05 02/13/2020    TROPONINI 0.06 01/16/2020    TROPONINI 0.05 08/03/2019     U/A:    Lab Results   Component Value Date    COLORU Yellow 02/13/2020    PHUR 6.0 02/13/2020    LABCAST RARE 01/16/2020    WBCUA 0-1 02/13/2020    WBCUA 0-1 11/28/2011    RBCUA 0-1 02/13/2020    RBCUA NONE 01/29/2014    BACTERIA NONE SEEN 02/13/2020    CLARITYU Clear 02/13/2020    SPECGRAV 1.020 02/13/2020    LEUKOCYTESUR Negative 02/13/2020    UROBILINOGEN 0.2 02/13/2020    BILIRUBINUR Negative 02/13/2020    BILIRUBINUR NEGATIVE 11/28/2011    BLOODU Negative 02/13/2020    GLUCOSEU Negative 02/13/2020    GLUCOSEU NEGATIVE 11/28/2011    AMORPHOUS FEW 02/10/2016     HgBA1c:  No components found for: HGBA1C  TSH:    Lab Results   Component Value Date    TSH 1.870 02/13/2020     -----------------------------------------------------------------    Objective:   Vitals: /60   Pulse 58   Temp 97.7 °F (36.5 °C) (Temporal)   Resp 16   Ht 5' 7\" (1.702 m)   Wt 127 lb 13.9 oz (58 kg)   SpO2 95%   BMI 20.03 kg/m²   General appearance: alert, appears stated age and cooperative  Skin: Skin color, texture, turgor normal. No rashes or lesions  HEENT: Head: Normal, normocephalic, atraumatic.   Neck: no adenopathy, no carotid bruit, no JVD, supple, symmetrical, trachea midline and thyroid not enlarged, symmetric, no tenderness/mass/nodules  Lungs: clear to auscultation bilaterally  Heart: regular rate and rhythm, S1, S2 normal, no murmur, click, rub or gallop  Abdomen: soft, non-tender; bowel sounds normal; no masses,  no organomegaly  Extremities: extremities normal, atraumatic, no cyanosis or

## 2020-02-17 NOTE — PROGRESS NOTES
capillary refill. LINES:  Peripheral     Recent Labs     02/15/20  0653   WBC 6.5   HGB 9.9*   HCT 32.7*   MCV 83.4          Recent Labs     02/15/20  0653 02/16/20  0711    140   K 4.5  4.5 5.0   CO2 20* 23   PHOS  --  3.3   BUN 44* 47*   CREATININE 2.2* 2.4*       Recent Labs     02/15/20  0653 02/16/20  0711   INR 1.1 1.1       ASSESSMENT/PLAN:     Active Problems:    Subdural hematoma (HCC)    Bilateral subdural hematomas (HCC)    AKIRA (acute kidney injury) ([facility] [address]  Resolved Problems:    * No resolved hospital problems. *    Neuro:  Chronic SDH. Falls. S/P bur hole craniotomy with drain placement. Monitor neuro status. Neurosurgery following. Monitor drainage. Keppra for seizure prophylaxis. Gabapentin. Keppra. For HCT in am.    CV:  No acute issues. Hx of CAD with MI, HTN & aortic stenosis. Monitor hemodynamics. BP goal systolic less than 451 mm Hg. PRN hydralzine & labetalol. Imdur. Lisinopril. Pulm: No acute issues. At risk for respiratory insufficiency. Monitor RR & SpO2. O2 as needed. Encourage cough, SMI  & deep breathing. GI: No acute issues. Protein calore malnutrition. BMI 21. Monitor bowel function. NPO. Zofran. Renal:  CKD. Hx of hyperkalemia  Monitor BUN & Cr., electrolytes & replace as needed. Monitor I & O.    Voids. [place]. Bicarbonate. Nephrology following. Flomax  Proscar. Bladder scan as needed. ID: No acute issues  Endocrine: No acute issues. Monitor BS.    MSK: Deconditioned.   ROM. Turn & reposition. PT & OT pending recommendations. Monitor for skin breakdown. Heme: No acute issues. Anemia of chronic disease. Hx of warfarin use    Monitor CBC. Bowel regime: Colace. MOM. Pain control/Sedation:  Tylenol. Dilaudid. DVT prophylaxis: SCD. NO Lovenox/heparin due to SDH. GI prophylaxis: Will start protonix. Ancillary consults: Medicine. Neurosurgery.

## 2020-02-17 NOTE — ANESTHESIA POSTPROCEDURE EVALUATION
Department of Anesthesiology  Postprocedure Note    Patient: Es Davenport  MRN: [id number]  YOB: 1927  Date of evaluation: 2/17/2020  Time:  11:28 AM     Procedure Summary     Date:  02/17/20 Room / Location:  [facility] OR 06 / [facility]    Anesthesia Start:  5559 Anesthesia Stop:  1115    Procedure:  SUBDURAL HEMATOMA BILATERAL  BRIAN HOLES (Right Head) Diagnosis:  (.)    Surgeon:  Judy Coleman MD Responsible Provider:  Racheal Polk MD    Anesthesia Type:  general, MAC ASA Status:  4          Anesthesia Type: general, MAC    Antonino Phase I: Antonino Score: 9    Antonino Phase II:      Last vitals: Reviewed and per EMR flowsheets.        Anesthesia Post Evaluation    Patient location during evaluation: PACU  Patient participation: complete - patient participated  Level of consciousness: awake and alert  Airway patency: patent  Nausea & Vomiting: no nausea and no vomiting  Complications: no  Cardiovascular status: hemodynamically stable and blood pressure returned to baseline  Respiratory status: acceptable  Hydration status: euvolemic

## 2020-02-17 NOTE — PROGRESS NOTES
Telemetry pack arrived to OR 6 with pt. Pack removed from pt's gown, cleaned, placed into a bag and sent to the floor in the tube system.

## 2020-02-17 NOTE — ANESTHESIA PRE PROCEDURE
Historical Provider, MD       Current medications:    Current Facility-Administered Medications   Medication Dose Route Frequency Provider Last Rate Last Dose    ceFAZolin (ANCEF) 500 mg in dextrose 5 % 50 mL IVPB  500 mg Intravenous See Admin Instructions Amira Hernández MD        dextrose 5 % and 0.9 % sodium chloride infusion   Intravenous Continuous Bud Camara MD 50 mL/hr at 02/16/20 2150      tamsulosin (FLOMAX) capsule 0.4 mg  0.4 mg Oral Daily Jada Graver, APRN - CNP   0.4 mg at 02/16/20 0915    lisinopril (PRINIVIL;ZESTRIL) tablet 5 mg  5 mg Oral Daily Jada Graver, APRN - CNP   5 mg at 02/16/20 0915    gabapentin (NEURONTIN) capsule 200 mg  200 mg Oral TID Jada Graver, APRN - CNP   200 mg at 02/16/20 2103    isosorbide mononitrate (IMDUR) extended release tablet 60 mg  60 mg Oral BID Jada Graver, APRN - CNP   60 mg at 02/16/20 2316    sodium bicarbonate tablet 650 mg  650 mg Oral BID Jada Graver, APRN - CNP   650 mg at 02/16/20 2103    docusate sodium (COLACE) capsule 100 mg  100 mg Oral Daily Jada Graver, APRN - CNP   100 mg at 02/16/20 0915    acetaminophen (TYLENOL) tablet 650 mg  650 mg Oral Q4H PRN Jada Graver, APRN - CNP        patiromer sorbitex calcium (VELTASSA) packet 8.4 g  8.4 g Oral Daily Jada Graver, APRN - CNP   8.4 g at 02/15/20 5535    levETIRAcetam (KEPPRA) tablet 750 mg  750 mg Oral BID Jada Graver, APRN - CNP   750 mg at 02/16/20 2103    hydrALAZINE (APRESOLINE) injection 10 mg  10 mg Intravenous Q4H PRN Jada Graver, APRN - CNP        labetalol (NORMODYNE;TRANDATE) injection 10 mg  10 mg Intravenous Q4H PRN Jada Graver, APRN - CNP           Allergies:     Allergies   Allergen Reactions    Lipitor      \"makes me weak\"       Problem List:    Patient Active Problem List   Diagnosis Code    HTN (hypertension) I10    CAD (coronary artery disease) I25.10    PVD (peripheral vascular disease) ([facility] I73.9    Time of last liquid consumption: 2300                        Time of last solid consumption: 1900                        Date of last liquid consumption: 02/16/20                        Date of last solid food consumption: 02/16/20    BMI:   Wt Readings from Last 3 Encounters:   02/14/20 127 lb 13.9 oz (58 kg)   02/10/20 129 lb (58.5 kg)   01/20/20 145 lb 1 oz (65.8 kg)     Body mass index is 20.03 kg/m². CBC:   Lab Results   Component Value Date    WBC 6.5 02/15/2020    RBC 3.92 02/15/2020    HGB 9.9 02/15/2020    HCT 32.7 02/15/2020    MCV 83.4 02/15/2020    RDW 14.5 02/15/2020     02/15/2020       CMP:   Lab Results   Component Value Date     02/16/2020    K 5.0 02/16/2020    K 4.5 02/15/2020     02/16/2020    CO2 23 02/16/2020    BUN 47 02/16/2020    CREATININE 2.4 02/16/2020    GFRAA 31 02/16/2020    LABGLOM 25 02/16/2020    GLUCOSE 92 02/16/2020    GLUCOSE 120 03/19/2012    PROT 6.7 02/13/2020    CALCIUM 8.5 02/16/2020    BILITOT 0.4 02/13/2020    ALKPHOS 85 02/13/2020    AST 20 02/13/2020    ALT 11 02/13/2020       POC Tests: No results for input(s): POCGLU, POCNA, POCK, POCCL, POCBUN, POCHEMO, POCHCT in the last 72 hours. Coags:   Lab Results   Component Value Date    PROTIME 12.4 02/16/2020    PROTIME 16.8 04/16/2012    INR 1.1 02/16/2020    APTT 36.7 02/13/2020       HCG (If Applicable): No results found for: PREGTESTUR, PREGSERUM, HCG, HCGQUANT     ABGs: No results found for: PHART, PO2ART, ULJ9WFK, XFB2CXA, BEART, C0OLVJTT     Type & Screen (If Applicable):  No results found for: LABABO, LABRH    EKG: Narrative & Impression     Atrial fibrillation with slow ventricular response  Left axis deviation  Right bundle branch block  Abnormal ECG  When compared with ECG of 16-JAN-2020 08:58,  Significant changes have occurred  Confirmed by Ival Gottron ([id number]) on 2/14/2020 7:51:31 AM        ECHO Summary   Mild left ventricular concentric hypertrophy noted.    Normal LV segmental wall motion. Ejection fraction is visually estimated at 70%. Stage II diastolic dysfunction. Borderline dilated right ventricle. Mildly enlarged right atrium size. The left atrium is mildly dilated. Moderate mitral annular calcification. Mild mitral regurgitation is present. Moderate aortic stenosis. Mild aortic regurgitation is noted. Mild tricuspid regurgitation. Pulmonary hypertension is mild to moderate . Electronically signed by Lanette Huitron MD(Interpreting   physician) on 08/05/2019 12:32 PM      Anesthesia Evaluation  Patient summary reviewed and Nursing notes reviewed no history of anesthetic complications:   Airway: Mallampati: II  TM distance: >3 FB   Neck ROM: full  Mouth opening: > = 3 FB Dental:          Pulmonary: breath sounds clear to auscultation                             Cardiovascular:    (+) hypertension:, valvular problems/murmurs (Moderate AS, Mild MR & Mild TR): AS and MR, past MI:, CAD:, CABG/stent (CABG in 90's per pt Cannot find records):, dysrhythmias (Right Bundle Branch Block; not currently in a-fib): atrial fibrillation, CHF (W/ preserved EF): diastolic, pulmonary hypertension: moderate,       ECG reviewed  Rhythm: regular  Rate: normal  Echocardiogram reviewed         Beta Blocker:  Not on Beta Blocker         Neuro/Psych:   (+) CVA (Current admission for Subdural Hematoma):,             GI/Hepatic/Renal:   (+) renal disease: CRI,          ROS comment: BPH, . Endo/Other:                     Abdominal:           Vascular:   + PVD, aortic or cerebral, . Anesthesia Plan      general and MAC     ASA 4       Induction: intravenous and inhalational.  BIS and arterial line  MIPS: Postoperative opioids intended, Prophylactic antiemetics administered and Postoperative trial extubation. Anesthetic plan and risks discussed with patient. Use of blood products discussed with patient whom consented to blood products.    Plan discussed with CRNA and attending. Anjali Fang RN   2/17/2020      DOS STAFF ADDENDUM:    Patient seen and chart reviewed. Physical exam and history updated as indicated. NPO status confirmed. Anesthesia options and plan discussed including risks benefits with patient/legal guardian and family as available. Concerns and questions addressed. Consent verbalized to proceed.   Anesthesia plan, options and intraoperative/postoperative concerns discussed with care team.    Candis Dai MD  2/17/2020  9:08 AM

## 2020-02-17 NOTE — PLAN OF CARE
Problem: Risk for Impaired Skin Integrity  Goal: Tissue integrity - skin and mucous membranes  Outcome: Met This Shift     Problem: Falls - Risk of:  Goal: Will remain free from falls  Outcome: Met This Shift     Problem: Falls - Risk of:  Goal: Absence of physical injury  Outcome: Met This Shift     Problem: Coping:  Goal: Ability to remain calm will improve  Outcome: Met This Shift     Problem: Safety:  Goal: Ability to remain free from injury will improve  Outcome: Met This Shift     Problem: Neurological  Goal: Maximum potential motor/sensory/cognitive function  Outcome: Met This Shift

## 2020-02-18 PROBLEM — I35.0 AORTIC STENOSIS: Status: ACTIVE | Noted: 2020-01-01

## 2020-02-18 PROBLEM — E43 SEVERE PROTEIN-CALORIE MALNUTRITION (HCC): Status: ACTIVE | Noted: 2020-01-01

## 2020-02-18 NOTE — CONSULTS
strain: Not on file    Food insecurity:     Worry: Not on file     Inability: Not on file    Transportation needs:     Medical: Not on file     Non-medical: Not on file   Tobacco Use    Smoking status: Former Smoker     Packs/day: 0.50     Years: 42.00     Pack years: 21.00     Types: Cigarettes     Start date: 80     Last attempt to quit:      Years since quittin.1    Smokeless tobacco: Never Used   Substance and Sexual Activity    Alcohol use: No     Comment: occasionally    Drug use: Never    Sexual activity: Not on file   Lifestyle    Physical activity:     Days per week: Not on file     Minutes per session: Not on file    Stress: Not on file   Relationships    Social connections:     Talks on phone: Not on file     Gets together: Not on file     Attends Tenriism service: Not on file     Active member of club or organization: Not on file     Attends meetings of clubs or organizations: Not on file     Relationship status: Not on file    Intimate partner violence:     Fear of current or ex partner: Not on file     Emotionally abused: Not on file     Physically abused: Not on file     Forced sexual activity: Not on file   Other Topics Concern    Not on file   Social History Narrative    Not on file       Allergies: Allergies   Allergen Reactions    Lipitor      \"makes me weak\"       Home Meds:  Prior to Admission medications    Medication Sig Start Date End Date Taking?  Authorizing Provider   tamsulosin (FLOMAX) 0.4 MG capsule Take 1 capsule by mouth daily 2/10/20  Yes Katy Mahoney DO   warfarin (COUMADIN) 3 MG tablet Take 1 tablet by mouth See Admin Instructions Take 3mg on Tues, Thurs and Saturday along with a 2.5mg tablet for total  Of 5.5 mg 2/10/20  Yes Katy Mahoney DO   warfarin (COUMADIN) 2.5 MG tablet Take 1 tablet by mouth See Admin Instructions Take one po qd on Tue, Thur, and Saturday along with a 3 mg tablet for a total of 5.5mg 2/10/20  Yes Katy Mahoney DO   lisinopril Intravenous Continuous Shyla Serrato MD 50 mL/hr at 02/17/20 1531      tamsulosin (FLOMAX) capsule 0.4 mg  0.4 mg Oral Daily CINDY Burrows CNP   0.4 mg at 02/16/20 0915    lisinopril (PRINIVIL;ZESTRIL) tablet 5 mg  5 mg Oral Daily CINDY Burrows CNP   5 mg at 02/16/20 0915    gabapentin (NEURONTIN) capsule 200 mg  200 mg Oral TID CINDY Burrows CNP   200 mg at 02/17/20 2241    isosorbide mononitrate (IMDUR) extended release tablet 60 mg  60 mg Oral BID CINDY Burrows CNP   Stopped at 02/17/20 2153    sodium bicarbonate tablet 650 mg  650 mg Oral BID CINDY Burrows CNP   650 mg at 02/17/20 2241    docusate sodium (COLACE) capsule 100 mg  100 mg Oral Daily CINDY Burrows CNP   100 mg at 02/16/20 0915    acetaminophen (TYLENOL) tablet 650 mg  650 mg Oral Q4H PRN CINDY Burrows CNP   650 mg at 02/18/20 9294    patiromer sorbitex calcium (VELTASSA) packet 8.4 g  8.4 g Oral Daily CINDY Burrows CNP   8.4 g at 02/15/20 5666    levETIRAcetam (KEPPRA) tablet 750 mg  750 mg Oral BID Marlee Soria MD   750 mg at 02/17/20 2241    hydrALAZINE (APRESOLINE) injection 10 mg  10 mg Intravenous Q4H PRN CINDY Burrows CNP   10 mg at 02/17/20 1902    labetalol (NORMODYNE;TRANDATE) injection 10 mg  10 mg Intravenous Q4H PRN CINDY Burrows CNP   10 mg at 02/17/20 1616      dextrose 5 % and 0.9 % NaCl 50 mL/hr at 02/17/20 1531       Review of Systems:   Constitutional: No fever, chills, sweats  Cardiac: As per HPI  Pulmonary: No cough, wheeze, hemoptysis  HEENT: No visual disturbances or difficult swallowing  GI: No nausea, vomiting, diarrhea, abdominal pain, rectal bleeding  : No dysuria or hematuria  Endocrine: No excessive thirst, heat or cold intolerance. Musculoskeletal: No joint pain or muscle aches.  No claudication  Skin: No skin breakdown or rashes  Neuro: Positive headache headache, no confusion, or seizures  Psych: No depression, anxiety    Physical Exam:  /69   Pulse (!) 34   Temp 96.8 °F (36 °C) (Bladder)   Resp 20   Ht 5' 7\" (1.702 m)   Wt 132 lb 3.2 oz (60 kg)   SpO2 98%   BMI 20.71 kg/m²   Weight change: Wt Readings from Last 3 Encounters:   02/18/20 132 lb 3.2 oz (60 kg)   02/10/20 129 lb (58.5 kg)   01/20/20 145 lb 1 oz (65.8 kg)       General: Awake, alert, oriented x3, no acute distress    HEENT: Normocephalic status post right bur hole craniotomy/drain. Pupils equal and round. Sclera nonicteric and oral mucosa moist.  Tongue and trachea midline. Neck: No JVD or bruits. No thyroid enlargement or adenopathy    Cardiac: Irregular rhythm, grade 2 systolic murmur at the base, diminished S2 intensity. No diastolic murmurs. Apical impulse is normal.  No murmurs, rubs or clicks. No carotid bruits and no abdominal bruits. No peripheral edema, cyanosis or clubbing. Normal pulses in the upper and lower extremities bilaterally. Resp: Unlabored respirations at rest.  No wheezes, rales or rhonchi. Abdomen: soft, nontender, nondistended, no gross organomegaly or mass    Skin: Warm and dry, no cyanosis. Musculoskeletal: normal tone and strength in the upper and lower extremities bilaterally    Neuro: Moves all extremities appropriately to command.   Normal sensation to light touch in the upper and lower extremities bilaterally    Psych: Cooperative, and normal affect    Intake/Output:    Intake/Output Summary (Last 24 hours) at 2/18/2020 0954  Last data filed at 2/18/2020 0800  Gross per 24 hour   Intake 2218.27 ml   Output 1440 ml   Net 778.27 ml     I/O this shift:  In: -   Out: 40 [Urine:40]    Laboratory Tests:  Lab Results   Component Value Date    CREATININE 2.3 (H) 02/18/2020    CREATININE 2.3 (H) 02/18/2020    BUN 43 (H) 02/18/2020    BUN 42 (H) 02/18/2020     02/18/2020     02/18/2020    K 4.9 02/18/2020    K 4.8 02/18/2020     02/18/2020     02/18/2020    CO2 22 02/18/2020    CO2 21 (L) 02/18/2020     Recent Labs     02/18/20  0345   TROPONINI 0.10*     Lab Results   Component Value Date    PROBNP 2,941 (H) 02/13/2020     Lab Results   Component Value Date    WBC 5.0 02/18/2020    RBC 3.67 02/18/2020    HGB 9.4 02/18/2020    HCT 30.6 02/18/2020    MCV 83.4 02/18/2020    MCH 25.6 02/18/2020    MCHC 30.7 02/18/2020    RDW 14.4 02/18/2020     02/18/2020    MPV 9.4 02/18/2020     Recent Labs     02/18/20  0345   ALKPHOS 129   ALT 13   AST 27   PROT 6.0*   BILITOT 0.5   LABALBU 2.9*     Lab Results   Component Value Date    MG 2.0 02/18/2020     Lab Results   Component Value Date    PROTIME 11.7 02/18/2020    PROTIME 16.8 04/16/2012    INR 1.0 02/18/2020     Lab Results   Component Value Date    TSH 1.870 02/13/2020     No components found for: CHLPL  Lab Results   Component Value Date    TRIG 139 02/10/2020    TRIG 91 08/17/2019    TRIG 95 02/13/2019     Lab Results   Component Value Date    HDL 52 02/10/2020    HDL 67 08/17/2019    HDL 63 02/13/2019     Lab Results   Component Value Date    LDLCALC 123 (H) 02/10/2020    [address] 08/17/2019    LDLCALC 79 02/13/2019     Chest x-ray and telemetry reviewed:  EKG: Atrial fibrillation/flutter with SVR, chronic right bundle branch block  Chest x-ray: No vascular congestion or effusions. CT head: Bilateral subdural hematomas  Telemetry: Chronic atrial flutter with slow ventricular rate but no pauses or advanced heart block. 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  Chronic atrial fibrillation/flutter with chronic slow ventricular rate, that is aggravated by SDH, but no prolonged pauses or heart block. Continue to follow conservatively. No indication for pacemaker. Avoid bradycardic drugs or AV maria dolores blocking agents. Do not restart oral anticoagulation. 2. Coronary artery disease is stable. 3. Stable moderate aortic valve stenosis. 4. Hypertension is now reasonably controlled. 5. Chronic stable right bundle branch block    6.  Stable CKD          Electronically signed by Asim Jack MD on 2/18/2020 at 9:54 AM

## 2020-02-18 NOTE — OP NOTE
[address]                  [address] [facility]  [facility]                                OPERATIVE REPORT    PATIENT NAME: Dariana Blakely                    :        1927  MED REC NO:   [id number]                            ROOM:       7899  ACCOUNT NO:   [de-identified]                           ADMIT DATE: 2020  PROVIDER:     Cy Irby MD      DATE OF PROCEDURE:  2020    PREOPERATIVE DIAGNOSIS:  Right hemispheric chronic subdural hematoma. POSTOPERATIVE DIAGNOSIS:  Right hemispheric chronic subdural hematoma. PROCEDURE PERFORMED:  Tarry Fregoso hole evacuation of a right frontoparietal  subdural hematoma. ANESTHESIA:  Monitored. ATTENDING SURGEON:  Cy Irby MD.    INDICATION:  This gentleman presented with history of falls and some  subtle confusion. Imaging showed bilateral subdural hematomas, the  right much larger than the left with mass effect. After obtaining  informed consent and discussion with power of , it was decided  to take him to the OR and only treat the right side under monitored  anesthesia. DESCRIPTION OF PROCEDURE:  He was placed in a left lateral decubitus  position on a beanbag with an axillary roll and given intravenous  sedation and monitored by anesthesia. The right parietofrontal region  was shaved, prepped, and draped per routine fashion. A single incision  was made over the posterior frontal boss in the right region 1 inch in  length. Self-retaining retractor placed in wound. Tarry Fregoso hole placed in  a standard fashion. Dura was cauterized, immediately noted was chronic  subdural fluid. The ventricular catheter tunneled from the incision  posteriorly 2 or 3 inches placed into the subdural space, connected to a  sterile closed drainage system. Wounds closed in layers. We did put a  asif hole cover over the asif hole. No apparent complications.    Estimated blood loss less than 50

## 2020-02-18 NOTE — CARE COORDINATION
2/18 Harinder cont. In NSICU. Discharge plan still remains [facility]. Jackie Aquino is following. Developed SB last pm.CArdiology consult. Inc BP need 4 doses of IV Antihypertensive. POD#1 from [employer]. Hens and Envelope in Soft Chart. Amrita Mackenzie RN cm

## 2020-02-18 NOTE — PROGRESS NOTES
Neurosurg progress note  VITALS:  BP (!) 141/63   Pulse (!) 33   Temp 97.3 °F (36.3 °C) (Temporal)   Resp 14   Ht 5' 7\" (1.702 m)   Wt 132 lb 3.2 oz (60 kg)   SpO2 98%   BMI 20.71 kg/m²   24HR INTAKE/OUTPUT:    Intake/Output Summary (Last 24 hours) at 2020 0758  Last data filed at 2020 0600  Gross per 24 hour   Intake 2218.27 ml   Output 1400 ml   Net 818.27 ml     Xr Pelvis (1-2 Views)    Result Date: 2020  Patient MRN:  [id number] : 3/9/1927 Age: 80 years Gender: Male Order Date:  2020 8:30 PM EXAM: XR PELVIS (1-2 VIEWS) NUMBER OF IMAGES: views INDICATION:  trauma trauma COMPARISON: None The bones appear to be in anatomic alignment. No evidence of displaced fracture. Multiple clips overlie the groin and lower abdomen. No evidence of displaced fracture. Xr Shoulder Right (min 2 Views)    Result Date: 2020  Patient MRN:  [id number] : 3/9/1927 Age: 80 years Gender: Male Order Date:  2020 6:30 AM EXAM: XR SHOULDER RIGHT (MIN 2 VIEWS) NUMBER OF IMAGES:  5 INDICATION:  R shoulder pain, h/o multiple falls COMPARISON: Right shoulder radiographs were 2020 RESULT: No fracture or dislocation. Superior subluxation of the humeral head with narrowing of the acromiohumeral interval compatible with chronic full-thickness rotator cuff tear. Moderate to severe degenerative changes of the acromioclavicular joint. Mild degenerative changes of the glenohumeral joint. Osteopenia. 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 Status post median sternotomy with a fracture sternal wire, unchanged since prior exam.     No acute osseous abnormality. Findings compatible with chronic full-thickness rotator cuff tear. 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     Ct Head Wo Contrast    Result Date: 2020  PROCEDURE INFORMATION: Exam: CT Head Without Contrast Exam date and time: 2020 11:00 PM Age: 80years old Clinical indication: Other: Intercranial hemmorrhage; Additional info: Follow up bilateral acute on chronic subdural hematomas TECHNIQUE: Imaging protocol: Computed tomography of the head without contrast. Radiation optimization: All CT scans at this facility use at least one of these dose optimization techniques: automated exposure control; mA and/or kV adjustment per patient size (includes targeted exams where dose is matched to clinical indication); or iterative reconstruction. COMPARISON: CT HEAD WO CONTRAST 2020 7:16 PM FINDINGS: Brain: Age consistent cerebral and cerebellar atrophy. Age consistent periventricular white matter abnormality. Bilateral mixed density subdural hematomas again noted. Right-sided subdural hematoma measures up to 2.0 cm in thickness. This measurement was performed on the coronal views. No significant change of the amount or characteristic of blood within the right subdural space. Moderate mass effect is unchanged. The smaller mixed density left subdural hematoma is also unchanged and is also best evaluated on the coronal images. No mass effect on the left side subdural hematoma. No intraparenchymal hemorrhage. Ventricles: Normal. No ventriculomegaly. Bones/joints: Normal. Sinuses: Visualized sinuses are unremarkable. No fluid levels. Mastoid air cells: Visualized mastoid air cells are well aerated. Soft tissues: Unremarkable. Report of prior examination is not available. This is usually provided. Bilateral mixed density subdural hematomas are unchanged as described above. The moderate mass-effect from the right subdural hematoma is stable. No intraparenchymal hemorrhage. If patient's neurologic status deteriorates recommend correlation with MRI.  This report has been electronically signed by Patel Chan MD.    Ct Head Wo Contrast    Result Date: 2020  Patient MRN:  [id number] : 3/9/1927 Age: 80 years Gender: Male Order Date:  2020 7:00 PM TECHNIQUE/NUMBER OF

## 2020-02-18 NOTE — PROGRESS NOTES
Nutrition Assessment    Type and Reason for Visit: Initial    Nutrition Recommendations: Continue current diet, Start Ensure BID    Nutrition Assessment: Pt severely malnourished w/ ongoing poor PO intake. Now at increased nutritional risk w/ increased nutrient needs s/p asif hole crani for SDH evac. Will provide ONS and monitor    Malnutrition Assessment:  · Malnutrition Status: Meets the criteria for severe malnutrition  · Context: Chronic illness  · Findings of the 6 clinical characteristics of malnutrition (Minimum of 2 out of 6 clinical characteristics is required to make the diagnosis of moderate or severe Protein Calorie Malnutrition based on AND/ASPEN Guidelines):  1. Energy Intake-Less than or equal to 75% of estimated energy requirement, Greater than or equal to 3 months    2. Weight Loss-10% loss or greater, in 6 months  3. Fat Loss-Moderate subcutaneous fat loss, Orbital, Triceps, Fat overlying the ribs  4. Muscle Loss-Moderate muscle mass loss, Temples (temporalis muscle), Clavicles (pectoralis and deltoids), Thigh (quadriceps), Calf (gastrocnemius)  5. Fluid Accumulation-No significant fluid accumulation    6.  Strength-Not measured    Nutrition Risk Level:  Moderate    Nutrient Needs:  · Estimated Daily Total Kcal: (MSJ 1193 x 1.2 SF)  · Estimated Daily Protein (g): 75-85(1.3-1.5 gm/kg adm wt)  · Estimated Daily Total Fluid (ml/day): per neuro management    Nutrition Diagnosis:   · Problem: Severe malnutrition, In context of chronic illness  · Etiology: related to Early satiety     Signs and symptoms:  as evidenced by Intake 25-50%, Diet history of poor intake, Weight loss greater than or equal to 10% in 6 months, Moderate loss of subcutaneous fat, Moderate muscle loss    Objective Information:  · Nutrition-Focused Physical Findings: pt sleeping soundly, missing teeth, soft abd, hypoactive BS, R subdural drain, s/p asif hole for SDH evac, no noted edema, +I/Os  · Wound Type: Multiple, Surgical Wound, Open Wounds, Skin Tears  · Current Nutrition Therapies:  · Oral Diet Orders: General   · Oral Diet intake: 26-50%(average per RN discussion)  · Oral Nutrition Supplement (ONS) Orders: None  · Anthropometric Measures:  · Ht: 5' 7\" (170.2 cm)   · Current Body Wt: 132 lb (59.9 kg)(bed scale 2/18)  · Admission Body Wt: 127 lb (57.6 kg)(bed scale 2/14)  · Usual Body Wt: 145 lb (65.8 kg)(actual per EMR 08/2019)  · % Weight Change: noted wt gain since adm w/ +fluid balance at this time, 12.4% wt loss x6 months PTA per EMR hx  · Ideal Body Wt: 148 lb (67.1 kg), % Ideal Body 86%(using adm wt)  · BMI Classification: BMI 18.5 - 24.9 Normal Weight    Nutrition Interventions:   Continued Inpatient Monitoring, Education not appropriate at this time, Coordination of Care    Nutrition Evaluation:   · Evaluation: Goals set   · Goals: Consume >50% meals/ONS    · Monitoring: Meal Intake, Supplement Intake, Diet Tolerance, Skin Integrity, Wound Healing, I&O, Mental Status/Confusion, Weight, Pertinent Labs, Monitor Bowel Function      Electronically signed by Apolinar Haq MS, RD, LD on 2/18/20 at 4:02 PM    Contact Number: [phone]

## 2020-02-18 NOTE — PROGRESS NOTES
Patient refusing meds at this time, states \"I don't want to take my medicine anymore, I don't want to live anymore\". 1:1 without positive outcome. Denies pain, VSS, no acute distress noted. NP & Dr. Perez Means aware, Palliative consulted.

## 2020-02-18 NOTE — PROGRESS NOTES
Physical Therapy  Physical Therapy Reevaluation    Name: Tesha Gould  : 3/9/1927  MRN: [id number]    Referring Provider:  Piper Diaz MD     Date of Service: 2020    Evaluating PT:  Jesus Mahmood PT, DPT. [id number]    Room #:  0832/2297-M  Diagnosis:  Subdural hematoma   Reason for admission:  Frequent falls, R shoulder pain   Precautions:  Falls, [place], SBP <160, subdural drain  Procedures:  B asif holes   Equipment Needs:  [facility]    SUBJECTIVE:  Pt lives alone in a 1 story home with 2 stair(s) to enter and 1 rail(s). Bed is on 1st floor and bath is on 1st floor. Pt ambulated with [facility] PTA. Pt independent for ADL performance and has nieces assist with IADLS PRN. OBJECTIVE:   ReEvaluation  Date:  Treatment   Short Term/ Long Term   Goals   AM-PAC 6 Clicks      Was pt agreeable to Eval/treatment? Yes      Does pt have pain?  Back of neck 5/10      Bed Mobility  Rolling: NT  Supine to sit: [place]  Sit to supine: NT  Scooting: [place]  Independent    Transfers Sit to stand: [place]  Stand to sit: [place]  Stand pivot: NT  supervision   Ambulation    70 ft with [facility] ModA    >150 feet with [facility] supervision   Stair negotiation: ascended and descended  NT  >2 steps with 1 rail supervision   ROM BUE:  See OT eval   BLE:  WFL     Strength BUE:  See OT eval   BLE:  4-/5   4+/5   Balance Sitting EOB:  SBA  Dynamic Standing:  ModA  Sitting EOB:  indep  Dynamic Standing:  supervision     -Pt is A & O x 4  -RASS: 0  -CAM-ICU: negative   -Sensation:  Unremarkable   -Edema:  Unremarkable     Vitals:  Heart Rate at rest 47 Heart Rate post session 54   SPO2 at rest 97% SPO2 post session 100%   Blood pressure at rest 137/69 Blood Pressure post session 165/83     Functional Status Score-Intensive Care Unit (FSS-ICU)   Rolling 3/7   Supine to sit transfer 3/7   Unsupported sitting  4/7   Sit to stand transfers 4/7   Ambulation 2/7   Total  16/35     Therapeutic Exercises:  Functional activity     Patient education  Pt educated on goals. Patient and family education will also be administered as needed. Frequency of treatments: 2-5x/week x 1-2 weeks. Time in  0910  Time out  0950    Total Treatment Time 30 minutes     Evaluation Time includes thorough review of current medical information, gathering information on past medical history/social history and prior level of function, completion of standardized testing/informal observation of tasks, assessment of data and education on plan of care and goals.     CPT codes:  [] Low Complexity PT evaluation 94460  [] Moderate Complexity PT evaluation 33239  [] High Complexity PT evaluation 68606  [x] PT Re-evaluation 34103  [] Gait training 33495 0 minutes  [] Manual therapy 86640 0 minutes  [x] Therapeutic activities 83470 30 minutes  [] Therapeutic exercises 86378 0 minutes  [] Neuromuscular reeducation 65436 0 minutes     Liza Weston, PT, DPT  [id number]

## 2020-02-18 NOTE — PROGRESS NOTES
Neuro Science Intensive Care Unit  Critical Care  Daily Progress Note 2/18/2020    Date of Admission: 02/13/20  Date of ICU Admission: 02/17/20    CC: Follow up for bur hole craniotomy for SDH. HOSPITAL COURSE/OVERNIGHT EVENTS:    02/13 Presented with AMS, head and neck pain. Trauma consulted dt hx of fall. Coumadin reversed with Vitamin K and Kcentra. 02/14 No significant events. Transferred to monitored floor. 02/17 Admitted to NSICU following bur hole craniotomy for SDH.    02/18 Developed SB during the night. Did require 4 doses of antihypertensive agent. PHYSICAL EXAM:  BP (!) 185/65   Pulse (!) 41   Temp 97.6 °F (36.4 °C) (Temporal)   Resp 17   Ht 5' 7\" (1.702 m)   Wt 132 lb 3.2 oz (60 kg)   SpO2 98%   BMI 20.71 kg/m²     Intake/Output Summary (Last 24 hours) at 2/18/2020 1219  Last data filed at 2/18/2020 0800  Gross per 24 hour   Intake 1818.27 ml   Output 1440 ml   Net 378.27 ml     General appearance:  Comfortable. Pain Description: slight headache. NEUROLOGIC:   RASS Score:  0  GCS:  14  4 - Opens eyes on own   6 - Follows simple motor commands  4 - Seems confused, disoriented       Pupil size:  Left 4 mm  Right 4 mm  Pupil reaction: Yes   PERRLA  Wiggles fingers: Left   Yes  Right Yes  Hand grasp:   Left: Yes     Right    Yes  Wiggles toes: Left   Yes Right  Yes  Plantar flexion: Left  Yes  Right   Yes  Facial droop:   none   Speech:  none  Crani incision:  Small amount drainage on dressing. Drain: reddish drainage. Previous 8 hours output: 5 ml. Previous 24 hour output: 25 ml. CONSTITUTIONAL: No acute distress, lying in hospital bed. CARDIOVASCULAR: S1 S2, regular rate, regular rhythm, no murmur/gallop/rub. Monitor: NSR. PULMONARY: Bilaterally clear. No rhonchi/rales/wheezes, no use of accessory muscles. Room air. RENAL:  Voids. Fluid balance for previous 24 hours: + 378 ml.     ABDOMEN: Soft, nontender, nondistended, nontympanic, normal bowel sounds. MUSCULOSKELETAL:  Moves all extremities purposefully, 5/5 strength   SKIN/EXTREMITIES: No rashes/ecchymosis, no edema/clubbing, warm/dry, good capillary refill. LINES:  Peripheral     Recent Labs     02/18/20  0345   WBC 5.0   HGB 9.4*   HCT 30.6*   MCV 83.4          Recent Labs     02/16/20  0711 02/17/20  1654 02/18/20  0345    139 139  137   K 5.0 6.4* 4.8  4.9   CO2 23 23 22  21*   PHOS 3.3  --  3.6   BUN 47* 46* 43*  42*   CREATININE 2.4* 2.2* 2.3*  2.3*       Recent Labs     02/16/20  0711 02/18/20  0345   PROT  --  6.0*   INR 1.1 1.0       ASSESSMENT/PLAN:     Active Problems:    HTN (hypertension)    CAD (coronary artery disease)    RBBB (right bundle branch block with left anterior fascicular block)    Bradycardia    Chronic atrial fibrillation    Subdural hematoma (HCC)    Bilateral subdural hematomas (HCC)    AKIRA (acute kidney injury) ([facility] [address]    Aortic stenosis  Resolved Problems:    * No resolved hospital problems. *    Neuro:  Chronic SDH. Falls. S/P bur hole craniotomy with drain placement. Monitor neuro status. Neurosurgery following. Monitor drainage. Keppra for seizure prophylaxis. Gabapentin. Keppra. For HCT in am.    CV:  No acute issues. Hx of CAD with MI, HTN & aortic stenosis. Monitor hemodynamics. BP goal systolic less than 443 mm Hg. PRN hydralzine & labetalol. Imdur. Lisinopril. Pulm: No acute issues. At risk for respiratory insufficiency. Monitor RR & SpO2. O2 as needed. Encourage cough, SMI  & deep breathing. GI: No acute issues. Protein calore malnutrition. BMI 21. Monitor bowel function. NPO. Zofran. Renal:  CKD. Hx of hyperkalemia  Monitor BUN & Cr., electrolytes & replace as needed. Monitor I & O.    Voids. [place]. Bicarbonate. Nephrology following. Flomax  Proscar. Bladder scan as needed. ID: No acute issues  Endocrine: No acute issues.     Monitor BS.    MSK: Deconditioned.   ROM. Turn & reposition. PT & OT pending recommendations. Monitor for skin breakdown. Heme: No acute issues. Anemia of chronic disease. Hx of warfarin use    Monitor CBC. Bowel regime: Colace. MOM. Pain control/Sedation:  Tylenol. Dilaudid. DVT prophylaxis: SCD. NO Lovenox/heparin due to SDH. GI prophylaxis: Will start protonix. Ancillary consults: Medicine. Neurosurgery. Nephrology. Patient/Family update: Will update when available. Code status: Limited code. Disposition: Re-admitted to NSICU.       Electronically signed by Sabrina Jones RN MSN APRN-NP [facility] NP  CCNS CCRN 2/18/2020 12:19 PM

## 2020-02-18 NOTE — PROGRESS NOTES
Admit Date: 2/13/2020      Subjective:     Patient: no acute issues reported  Medication side effects: none    Scheduled Meds:   ceFAZolin  1,000 mg Intravenous Q12H    ceFAZolin  500 mg Intravenous See Admin Instructions    tamsulosin  0.4 mg Oral Daily    lisinopril  5 mg Oral Daily    gabapentin  200 mg Oral TID    isosorbide mononitrate  60 mg Oral BID    sodium bicarbonate  650 mg Oral BID    docusate sodium  100 mg Oral Daily    patiromer sorbitex calcium  8.4 g Oral Daily    levETIRAcetam  750 mg Oral BID     Continuous Infusions:   dextrose 5 % and 0.9 % NaCl 50 mL/hr at 02/17/20 1531     PRN Meds:magnesium hydroxide, ondansetron, HYDROmorphone **OR** HYDROmorphone, acetaminophen, hydrALAZINE, labetalol    Objective:   I/O last 3 completed shifts: In: 2218.3 [P.O.:720; I.V.:1448.3;  IV Piggyback:50]  Out: 1400 [RYKID:6082; Drains:25]  I/O this shift:  In: -   Out: 40 [Urine:40]    /69   Pulse (!) 34   Temp 96.8 °F (36 °C) (Bladder)   Resp 20   Ht 5' 7\" (1.702 m)   Wt 132 lb 3.2 oz (60 kg)   SpO2 98%   BMI 20.71 kg/m²   General appearance: no distress  Skin:negative  Heent:negative  Lungs: clear to auscultation bilaterally  Heart: irregularly irregular rhythm  Abdomen: soft, non-tender; bowel sounds normal; no masses,  no organomegaly  Extremities:no edema  Neurologic:  Awake and follows commands    Labs:  CBC with Differential:    Lab Results   Component Value Date    WBC 5.0 02/18/2020    RBC 3.67 02/18/2020    HGB 9.4 02/18/2020    HCT 30.6 02/18/2020     02/18/2020    MCV 83.4 02/18/2020    MCH 25.6 02/18/2020    MCHC 30.7 02/18/2020    RDW 14.4 02/18/2020    SEGSPCT 63 03/19/2012    LYMPHOPCT 13.3 02/15/2020    MONOPCT 11.0 02/15/2020    BASOPCT 0.2 02/15/2020    MONOSABS 0.72 02/15/2020    LYMPHSABS 0.87 02/15/2020    EOSABS 0.22 02/15/2020    BASOSABS 0.01 02/15/2020     BMP:    Lab Results   Component Value Date     02/18/2020     02/18/2020    K 4.9

## 2020-02-19 NOTE — CONSULTS
Palliative Care Department  Palliative Care Initial Consult  Provider: Kenia WHITE-CNP, MONTSEP-BC    Hospital Day: 7    Referring Provider: CINDY Denson - CNS     Reason for Consult:  [x]  Code status Discussion  [x]  Assist with goals of care  [x]  Psychosocial support  []  Symptom Management  []  Advanced Care Planning    Chief Complaint: Clifton Ramsey is a 80 y.o. male with chief complaint of R evacuation of Subdural hematoma, [facility] . [facility]    Diagnosis Date Noted    Aortic stenosis [I35.0] 02/18/2020    Severe protein-calorie malnutrition ([facility] [address] [E43] 02/18/2020    AKIRA (acute kidney injury) ([facility] [address] [N17.9] 02/14/2020    Subdural hematoma ([facility] [address] [S06.5X9A] 02/13/2020    Bilateral subdural hematomas ([facility] [address] [S06.5X9A] 02/13/2020    Chronic atrial fibrillation [I48.20] 02/13/2019    HTN (hypertension) [I10] 05/10/2016    CAD (coronary artery disease) [I25.10] 05/10/2016    RBBB (right bundle branch block with left anterior fascicular block) [I45.2] 05/10/2016    Bradycardia [R00.1]            Palliative Care Encounter/Recommendations:      - Goals of care: continue current management and to be determined     - Code Status: limited code- YES to resuscitative medications      - Symptom Management: Per NSICU       Subjective:     HPI:  Clifton Ramsey is a 80 y.o. male with significant past medical history of previous smoker, acute MI, atrial arrhythmia, CAD, chronic anticoagulation, bradycardia, hemiparesis, HTN, moderate Aortic Stenosis,several falls, subtle confusion who presented on 2/13 with AMS, head and neck pain,  increased confusion, nausea with vomiting. Karlene Carvalho asleep on 2/10 in chair, fell and hit his head. 2/13 CT head showed bilateral subdural hematomas, large on the R side. 2/14 CT cervical spine showed sever degenerative changes.  Patient with chronic bilateral subdural hematomas, R>L. 2/17 Had asif hole evacuation of R frontoparietal.    Subjective/Events/Discussions:  Patient from home, one story, 2 steps to enter premises. Patient no longer able to drive. Patient is going to a Nursing Home. Stated that he is not thrilled with the idea, but that is where he needs to be. Reviewed code status and he stated that he does not want any heroics, but will take IV fluids , resuscitative medications if needed. Limited Code- YES to resuscitative medications. Stated that he has never been , no children. His niece Minor Flaming is his HC-POA. Woman approached when I was leaving, I said\"is this your niece Minor Flaming? \"  He said no, that is my niece Shimon Railing. Alert and oriented x 4. NO c/o pain at this time. Stated he has lost 20 lbs over the last few weeks. Encouraged him to eat. Will follow along closely for support of patient and family. - Family Meeting:   Participants:patient and No family meeting held today   Family meeting was held to discuss:Code status, family suport , goals of care, prior expressed wishes and discharge plan    -Surrogate/Legal NOK: Niece Minor Flaming     Contacts: Thomas Leroy [phone]      Past Medical History:   Diagnosis Date    Acute [facility])     Arrhythmia atrial     Bradycardia     CAD (coronary artery disease)     Hemiparesis ([facility] [address]     Hypertension     Moderate aortic stenosis 08/05/2019       Past Surgical History:   Procedure Laterality Date    CORONARY ARTERY BYPASS GRAFT      CRANIOTOMY Right 2/17/2020    SUBDURAL HEMATOMA BILATERAL  BRIAN HOLES performed by Osmar Pike MD at [address] [place] [address]         History reviewed. No pertinent family history.     Social history:   status: yes- [employer]   Marital status: Single- never , no children   Living status: alone   Work history: unknown     Allergies   Allergen Reactions    Lipitor      \"makes me weak\"       ROS: UNLESS STATED PATIENT DENIES: NO complaints   CONSTITUTIONAL:  fever, chill, rigors, nausea, vomiting, fatigue. HEENT: blurry vision, double vision, hearing problem, tinnitus, hoarseness, dysphagia, odynophagia  RESPIRATORY: cough, shortness of breath, sputum expectoration. CARDIOVASCULAR:  Chest pain/pressure, palpitation, syncope, irregular beats  GASTROINTESTINAL:  abdominal or rectal pain, diarrhea, constipation, . GENITOURINARY:  Burning, frequency, urgency, incontinence, discharge  INTEGUMENTARY: rash, wound, pruritis  HEMATOLOGIC/LYMPHATIC:  Swelling, sores, gum bleeding, easy bruising, pica. MUSCULOSKELETAL:  pain, edema, joint swelling or redness  NEUROLOGICAL:  light headed, dizziness, loss of consciousness, weakness, change in memory, seizures, tremors    Objective:     2/18/2020 CT head WO contrast   Impression       Substantial evacuation of right-sided subdural hematoma. There is a   modest residual. No significant midline shift. 2/14/2020 CT head WO contrast  Impression   Bilateral mixed density subdural hematomas are unchanged as described above. The moderate mass-effect from the right subdural hematoma is stable.       No intraparenchymal hemorrhage.       If patient's neurologic status deteriorates recommend correlation with MRI.     2/13/2020 CT head WO contrast  Impression   Bilateral subdural hematomas, large on the right side of   mixed density with areas of more recent or acute bleed.         Physical Exam  BP (!) 164/62   Pulse 53   Temp 98.6 °F (37 °C) (Bladder)   Resp 15   Ht 5' 7\" (1.702 m)   Wt 132 lb 3.2 oz (60 kg)   SpO2 96%   BMI 20.71 kg/m²     Gen:  Alert, appears stated age, mal nourished, chronic ill appearance  in no acute distress, stated was cold- gave him a couple blankets   HEENT:  Normocephalic, conjunctiva pink, no drainage, mucosa moist, chronic palsy, drain to R side of head    Neck:  Supple  Lungs:  Diminished bilaterally, no audible rhonchi or wheezes noted  Heart[de-identified]  SB, + murmur, rub, or gallop noted during exam  Abd:  Soft, non tender, non

## 2020-02-19 NOTE — PROGRESS NOTES
K 4.9 02/18/2020    K 4.8 02/18/2020     02/18/2020     02/18/2020    CO2 22 02/18/2020    CO2 21 02/18/2020    BUN 43 02/18/2020    BUN 42 02/18/2020    LABALBU 2.9 02/18/2020    LABALBU 4.2 03/19/2012    CREATININE 2.3 02/18/2020    CREATININE 2.3 02/18/2020    CALCIUM 8.2 02/18/2020    CALCIUM 8.3 02/18/2020    GFRAA 32 02/18/2020    GFRAA 32 02/18/2020    LABGLOM 27 02/18/2020    LABGLOM 27 02/18/2020     PT/INR:    Lab Results   Component Value Date    PROTIME 11.7 02/18/2020    PROTIME 16.8 04/16/2012    INR 1.0 02/18/2020     Last 3 Troponin:    Lab Results   Component Value Date    TROPONINI 0.10 02/18/2020    TROPONINI 0.05 02/13/2020    TROPONINI 0.06 01/16/2020     TSH:    Lab Results   Component Value Date    TSH 1.870 02/13/2020      Assessment:     Active Problems:    HTN (hypertension)    CAD (coronary artery disease)    RBBB (right bundle branch block with left anterior fascicular block)    Bradycardia    Chronic atrial fibrillation    Subdural hematoma (HCC)    Bilateral subdural hematomas (HCC)    AKIRA (acute kidney injury) ([facility] [address]    Aortic stenosis    Severe protein-calorie malnutrition ([facility] [address]        Plan:       Continue same plan and orders

## 2020-02-19 NOTE — PROGRESS NOTES
laundry, shopping, and transportation  Driving: no  Occupation: retired from [employer] store     Pain Level: 0/10  Cognition: A&O: 4/4; Follows 2-3 step directions              Memory: F-; P immediate word recall 2/3              Sequencing: F              Problem solving: F              Judgement/safety: F at ww level; cues for hand placement, ww positioning during ADL routine. Increased awareness of situation- asking about drain and purpose. RASS: 0  CAM-ICU: Negative, x2 mistakes for inattention                Functional Assessment:    RE-Eval Status  Date: 2/14/20 Treatment Status  Date: 2/19/20 STG=LTG (~5-14  days)      Feeding Set up A         improve self feeding task to independent   Grooming Mod A overall  To brush teeth, wipe face, and wash hands in standing; Assistance for packaging mgmt d/t FM impairment; mod A for standing balance (posterior lean) with fatigue, corrected with cues to CGA  Min A  For dyn standing at sink with ww; safety cues for positioning and hand placement for balance.    Washing hands/face and brushing teeth.     improve grooming/hygiene tasks to SBA while standing at sink    UB Dressing Min A     Min A  Seated EOB donning gown like jacket; educated on compensatory techniques d/t R shoulder ROM limitation/pain, F carryover.    improve UB dressing to mod I   LB Dressing Min A overall  SBA to doff/norma socks while seated at bedside chair  Min A  For dyn standing balance during gown mgmt; seated bedside arm chair to chair socks with cues to cross leg over opposite vs bending for fall prevention.     improve LB dressing to SBA with AE PRN   Bathing Min A  NT    improve UB/LB bathing to SBA   Toileting Min A overall  Min A  Simulation; for dyn balance    improve toileting task and all clothing mgmt to Supervision   Bed Mobility  Supine to sit: Min A  Sit to supine: NT  Max cues for appropriate sequencing  Supine to sit: SBA  Sit to supine: NT   improve bed mobility to SBA in ww when ambulating. Poor safety when maneuvering in small spaces within the room- LLE noted to be outside perimeter of ww. Completed grooming tasks at sink as stated above. HR stable with activity. LB dressing seated in arm chair. Increased SOB but SpO2 stable. Overall, pt continues to demo decreased dynamic standing balance, endurance and safety impacting independent participation in ADL/IADL tasks, functional transfers and functional mobility. After session, patient left sitting upright in arm chair with all devices within reach, all lines and tubes intact, nursing notified of pt status. Treatment: OT services provided included instruction/training on: safety, compensatory techniques and hand placement for balance support for completion of UB/LB dressing and grooming tasks at ww level; dyn standing balance/ endurance training;  proper hand/feet placement,  positioning/safety techniques, sequencing, and midline posture during transfers and functional mobility ww level; energy conservation/pacing/breathing techniques; R shoulder AAROM self ranging into shoulder flexion x5 reps as stated above- educated on exercises in supine to prevent stiffness/pain during UB ADLs. G return demo. Therapist provided skilled monitoring of HR, O2 saturation, blood pressure and patients response during treatment session; line mgmt required for pt mobilization. Pt additionally educated on OT POC, increasing OOB activity, fall risk precautions/call light use, purpose of subdural drain. G understanding. Vitals:   BP at rest: 171/73 BP at end of session: 145/74   HR at rest: 64 bpm HR at end of session: 66 bpm   Spo2 at rest: 93% room air  Spo2 at end of session: --     · Pt has made G progress towards set goals.    · Continue with current plan of care    Treatment Time In: 9080            Treatment Time Out: 1028            Treatment Charges: Mins Units   Ther Ex  [id number]     Manual Therapy Jean Quan [phone]

## 2020-02-19 NOTE — PLAN OF CARE
Problem: Risk for Impaired Skin Integrity  Goal: Tissue integrity - skin and mucous membranes  Description  Structural intactness and normal physiological function of skin and  mucous membranes.   2/19/2020 1609 by Marlee Sicard, RN  Outcome: Met This Shift     Problem: Falls - Risk of:  Goal: Will remain free from falls  Description  Will remain free from falls  2/19/2020 1609 by Marlee Sicard, RN  Outcome: Met This Shift     Problem: Falls - Risk of:  Goal: Absence of physical injury  Description  Absence of physical injury  2/19/2020 1609 by Marlee Sicard, RN  Outcome: Met This Shift     Problem: Coping:  Goal: Ability to remain calm will improve  Description  Ability to remain calm will improve  2/19/2020 1609 by Marlee Sicard, RN  Outcome: Met This Shift     Problem: Safety:  Goal: Ability to remain free from injury will improve  Description  Ability to remain free from injury will improve  2/19/2020 1609 by Marlee Sicard, RN  Outcome: Met This Shift     Problem: Self-Care:  Goal: Ability to participate in self-care as condition permits will improve  Description  Ability to participate in self-care as condition permits will improve  2/19/2020 1609 by Marlee Sicard, RN  Outcome: Met This Shift     Problem: Neurological  Goal: Maximum potential motor/sensory/cognitive function  2/19/2020 1609 by Marlee Sicard, RN  Outcome: Ongoing

## 2020-02-19 NOTE — PROGRESS NOTES
-- 66 -- 99 % --   02/19/20 0941 (!) 171/73 -- -- 64 -- 93 % --   02/19/20 0900 (!) 193/60 -- -- 54 -- 98 % --   02/19/20 0800 (!) 180/69 97.8 °F (36.6 °C) Temporal 56 -- 98 % --   02/19/20 0700 (!) 174/60 -- -- 55 -- 97 % --   02/19/20 0600 (!) 164/62 -- -- 53 15 96 % 132 lb 3.2 oz (60 kg)   02/19/20 0500 (!) 145/63 -- -- 53 16 96 % --   02/19/20 0400 (!) 145/52 98.6 °F (37 °C) Bladder 54 20 97 % --   02/19/20 0309 (!) 169/72 -- -- -- -- -- --   02/19/20 0300 (!) 169/72 -- -- 52 22 97 % --   02/19/20 0200 (!) 168/65 -- -- 52 18 97 % --   02/19/20 0100 (!) 161/75 -- -- 52 14 95 % --   02/19/20 0000 (!) 154/57 99.5 °F (37.5 °C) Bladder 52 16 94 % --   02/18/20 2300 (!) 156/55 -- -- 52 12 96 % --   02/18/20 2200 (!) 154/52 -- -- 52 21 94 % --   02/18/20 2100 (!) 162/59 -- -- 54 20 96 % --   02/18/20 2000 (!) 147/58 99.3 °F (37.4 °C) Bladder 53 11 97 % --   02/18/20 1900 (!) 158/52 -- -- 52 19 95 % --   02/18/20 1800 (!) 147/100 -- -- 56 15 95 % --   02/18/20 1700 (!) 159/56 -- -- 55 17 96 % --   @      Intake/Output Summary (Last 24 hours) at 2/19/2020 1631  Last data filed at 2/19/2020 1600  Gross per 24 hour   Intake 1462.26 ml   Output 1425 ml   Net 37.26 ml         Wt Readings from Last 3 Encounters:   02/19/20 132 lb 3.2 oz (60 kg)   02/10/20 129 lb (58.5 kg)   01/20/20 145 lb 1 oz (65.8 kg)       Constitutional:  in no acute distress  Oral: mucus membranes moist  Neck: no JVD  Cardiovascular: S1, S2 regular rhythm, no murmur,or rub  Respiratory:  No crackles, no wheeze  Gastrointestinal:  Soft, nontender, nondistended, NABS  Ext: no edema, feet warm  Skin: dry, no rash  Neuro: awake, alert, interactive      DATA:    Recent Labs     02/18/20  0345   WBC 5.0   HGB 9.4*   HCT 30.6*   MCV 83.4        Recent Labs     02/17/20  1654 02/18/20  0345    139  137   K 6.4* 4.8  4.9   * 106  104   CO2 23 22  21*   MG  --  2.0   PHOS  --  3.6   BUN 46* 43*  42*   CREATININE 2.2* 2.3*  2.3*   ALT  --

## 2020-02-19 NOTE — PROGRESS NOTES
Unremarkable. LABS:    Recent Labs     02/17/20  1654 02/18/20  0345    139  137   CREATININE 2.2* 2.3*  2.3*       Recent Labs     02/18/20  0345   HGB 9.4*       Recent Labs     02/18/20  0345   INR 1.0         IMPRESSION:    #1.  Bradycardia-rate is mildly bradycardic at this time but no permanent or temporary pacemaker is indicated. Avoid beta-blockers, rate slowing calcium channel blockers, or clonidine. #2.  Permanent atrial fibrillation- rate currently controlled. No antiarrhythmics added nor required. #3. Anticoagulation status-do not restart anticoagulation. Given CAD and AF, would at least consider enteric-coated baby aspirin daily if felt appropriate by neurosurgery. #4.  CAD-no indication of unstable angina. #5. Hypertension-blood pressure elevated at this time but was controlled yesterday. No new medication changes made. If hypertension is persistent would increase lisinopril to 10 mg daily and change Imdur to 120 mg daily not 60 mg twice daily. #6. Hyperlipidemia- when adding a statin medication as outpatient given history of CAD. #7.  Moderate aortic stenosis-no acute issues. #8.  With the above in mind, no further inpatient cardiac recommendations pending. Sign off and I arranged follow-up in my office in 3 months.

## 2020-02-19 NOTE — PROGRESS NOTES
[facility]  Critical Care  Daily Progress Note 2/19/2020    Date of Admission: 2/13/20  CC:  Chronic SDH      HOSPITAL EVENTS  2/13 Presented with AMS, head and neck pain. Trauma consulted dt hx of fall. Coumadin reversed with Vitamin K and Kcentra. 2/14 No significant events. 02/17 Admitted to NSICU following bur hole craniotomy for SDH.    02/18 Developed SB during the night. Did require 4 doses of antihypertensive agent. OVERNIGHT EVENTS: T max 99.5 F. Did  Require 2 additional doses of antihypertensive agent. Did not require  insulin in the previous 24 hours. PHYSICAL EXAM:    BP (!) 164/62   Pulse 53   Temp 98.6 °F (37 °C) (Bladder)   Resp 15   Ht 5' 7\" (1.702 m)   Wt 132 lb 3.2 oz (60 kg)   SpO2 96%   BMI 20.71 kg/m²       Intake/Output Summary (Last 24 hours) at 2/19/2020 0714  Last data filed at 2/19/2020 0600  Gross per 24 hour   Intake 1629.41 ml   Output 1245 ml   Net 384.41 ml       General appearance:  Comfortable. NEUROLOGIC:    GCS:    4 - Opens eyes on own   6 - Follows simple motor commands  5 - Alert and oriented       Pupil size:  Left 3 mm  Right 3 mm  Pupil reaction: Yes   PERRLA  Wiggles fingers: Left Yes Right Yes  Hand grasp:   Left present     Right present  Wiggles toes: Left Yes    Right Yes  Plantar flexion: Left present    Right present  SDH drain- serosanguinous drainage. 0cc last 8 hours, 10cc last 24 hours. CONSTITUTIONAL: No acute distress  CARDIOVASCULAR: S1 S2, regular rate, irregular rhythm,Monitor: Afib   PULMONARY:  Respirations unlabored. No rhonchi/rales/wheezes. ABDOMEN: Soft, nontender, nondistended, nontympanic, normal bowel sounds. MUSCULOSKELETAL: FELIZ   SKIN/EXTREMITIES: No rashes/ecchymosis, no edema/clubbing, warm/dry, good capillary refill.      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

## 2020-02-19 NOTE — PROGRESS NOTES
(36.2 °C) Bladder (!) 41 17 98 % --   02/18/20 1100 (!) 154/55 -- -- (!) 39 18 97 % --   02/18/20 1000 (!) 154/77 -- -- 52 17 98 % --   02/18/20 0900 137/69 -- -- (!) 34 20 98 % --   02/18/20 0800 (!) 155/63 96.8 °F (36 °C) Bladder (!) 40 17 98 % --   02/18/20 0745 -- -- -- (!) 47 19 98 % --   02/18/20 0700 (!) 141/63 -- -- (!) 33 14 98 % --   02/18/20 0600 (!) 163/64 -- -- (!) 30 15 97 % 132 lb 3.2 oz (60 kg)   02/18/20 0500 (!) 145/60 -- -- 51 16 96 % --   02/18/20 0400 (!) 161/54 97.3 °F (36.3 °C) Temporal 50 14 96 % --   02/18/20 0300 (!) 158/77 -- -- 51 18 97 % --   02/18/20 0200 (!) 148/58 -- -- 51 18 97 % --   02/18/20 0100 (!) 162/59 -- -- 51 18 99 % --   02/18/20 0000 (!) 143/62 97.6 °F (36.4 °C) Temporal 51 16 100 % --   02/17/20 2300 (!) 158/58 -- -- 51 16 100 % --   @      Intake/Output Summary (Last 24 hours) at 2/18/2020 2239  Last data filed at 2/18/2020 2200  Gross per 24 hour   Intake 1936.27 ml   Output 1745 ml   Net 191.27 ml         Wt Readings from Last 3 Encounters:   02/18/20 132 lb 3.2 oz (60 kg)   02/10/20 129 lb (58.5 kg)   01/20/20 145 lb 1 oz (65.8 kg)       Constitutional:  in no acute distress  Oral: mucus membranes moist  Neck: no JVD  Cardiovascular: S1, S2 regular rhythm, no murmur,or rub  Respiratory:  No crackles, no wheeze  Gastrointestinal:  Soft, nontender, nondistended, NABS  Ext: no edema, feet warm  Skin: dry, no rash  Neuro: awake, alert, interactive      DATA:    Recent Labs     02/18/20  0345   WBC 5.0   HGB 9.4*   HCT 30.6*   MCV 83.4        Recent Labs     02/16/20  0711 02/17/20  1654 02/18/20  0345    139 139  137   K 5.0 6.4* 4.8  4.9    110* 106  104   CO2 23 23 22  21*   MG 2.0  --  2.0   PHOS 3.3  --  3.6   BUN 47* 46* 43*  42*   CREATININE 2.4* 2.2* 2.3*  2.3*   ALT  --   --  13   AST  --   --  27   BILITOT  --   --  0.5   ALKPHOS  --   --  129       Lab Results   Component Value Date    LABPROT 1.4 (H) 01/18/2020    LABPROT 1.4 01/18/2020

## 2020-02-19 NOTE — PROGRESS NOTES
Supine to sit transfer 3/7   Unsupported sitting  4/7   Sit to stand transfers 4/7   Ambulation 2/7   Total  16/35     Therapeutic Exercises:  Functional activity     Patient education  Pt educated on safety, sequencing of transfers, gait pattern, use of [employer], [employer] approximation, and PT POC    Patient response to education:   Pt verbalized understanding Pt demonstrated skill Pt requires further education in this area   Yes  With cues  Yes      ASSESSMENT:    Comments:  Pt received supine in bed and agreeable to PT session with OT collaboration. RN cleared pt for participation prior to session. Pt with improved ability to sit up to EOB this date. Ambulation this date slightly worse than previous date d/t increased L sided lean with more difficulty advancing LLE into walker. Informed RN of these changes. Pt also standing with feet posterior to COG causing hard anterior lean. Tolerated 4 minutes standing activity. Seated in chair to end session. Discussed with RN. Treatment:  Patient practiced and was instructed in the following treatment:     Patient education provided continuously throughout session for sequencing, safety maintenance, and improving any deficits found during the evaluation.  Bed mobility with cues for sequencing and management of tubes/lines.  Standing x 4 minutes to increase activity tolerance.  Gait training with use of [employer] cued and assisted to correct L sided lean, increased step length of LLE, and improve body positioning inside walker. · Seated in chair to end session to improve OOB time and activity tolerance. · Non-pharmacological treatment and prevention of ICU delirium - Pt oriented to date, time, time of day, place, and situation as well as provided with visual and auditory stimuli in order to improve cognition and combat effects of ICU delirium.        PLAN:    Continue PT POC    Time in  0950  Time out  1028    Total Treatment Time 38 minutes     Evaluation Time includes

## 2020-02-19 NOTE — PROGRESS NOTES
Neurosurg progress note  VITALS:  BP (!) 145/74   Pulse 66   Temp 97.8 °F (36.6 °C) (Temporal)   Resp 15   Ht 5' 7\" (1.702 m)   Wt 132 lb 3.2 oz (60 kg)   SpO2 99%   BMI 20.71 kg/m²   24HR PULSE RANGE: Pulse  Av.4  Min: 51  Max: 66  24HR INTAKE/OUTPUT:    Intake/Output Summary (Last 24 hours) at 2020 1204  Last data filed at 2020 1116  Gross per 24 hour   Intake 1739.41 ml   Output 1225 ml   Net 514.41 ml     Xr Pelvis (1-2 Views)    Result Date: 2020  Patient MRN:  [id number] : 3/9/1927 Age: 80 years Gender: Male Order Date:  2020 8:30 PM EXAM: XR PELVIS (1-2 VIEWS) NUMBER OF IMAGES: views INDICATION:  trauma trauma COMPARISON: None The bones appear to be in anatomic alignment. No evidence of displaced fracture. Multiple clips overlie the groin and lower abdomen. No evidence of displaced fracture. Xr Shoulder Right (min 2 Views)    Result Date: 2020  Patient MRN:  [id number] : 3/9/1927 Age: 80 years Gender: Male Order Date:  2020 6:30 AM EXAM: XR SHOULDER RIGHT (MIN 2 VIEWS) NUMBER OF IMAGES:  5 INDICATION:  R shoulder pain, h/o multiple falls COMPARISON: Right shoulder radiographs were 2020 RESULT: No fracture or dislocation. Superior subluxation of the humeral head with narrowing of the acromiohumeral interval compatible with chronic full-thickness rotator cuff tear. Moderate to severe degenerative changes of the acromioclavicular joint. Mild degenerative changes of the glenohumeral joint. Osteopenia. 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 Status post median sternotomy with a fracture sternal wire, unchanged since prior exam.     No acute osseous abnormality. Findings compatible with chronic full-thickness rotator cuff tear. 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     Ct Head Wo Contrast    Result Date: 2020  PROCEDURE INFORMATION: Exam: CT Head Without Contrast Exam date and time: adjustment per patient size (includes targeted exams where dose is matched to clinical indication); or iterative reconstruction. COMPARISON: CT CERVICAL SPINE WO CONTRAST 2020 9:48 AM FINDINGS: Vertebrae: No vertebral fracture. Mild spinal canal stenosis secondary to the chronic degenerative changes. Discs/Spinal canal/Neural foramina: Severe Degenerative change with osteophyte formation and disc space narrowing from the C2-C3 level through the C6-C7 level. Multilevel facet arthropathy noted. Soft tissues: Partial calcification of the nuchal ligament. This is of no clinical significance. Lungs: There is biapical pleural thickening consistent with chronic inflammation. Vasculature: There is atherosclerotic calcification of the carotid arteries. Severe degenerative changes as described above. No fracture. This report has been electronically signed by Shahnaz Dixon MD.    Xr Chest Portable    Result Date: 2020  Patient MRN:  [id number] : 3/9/1927 Age: 80 years Gender: Male Order Date:  2020 7:00 PM EXAM: XR CHEST PORTABLE COMPARISON: 2020 INDICATION:  ams, falls ams, falls FINDINGS: There are chronic interstitial changes bilaterally. There is stable mild cardiomegaly. No focal airspace opacity or pleural effusion. There are median sternotomy wires. No pneumothorax. 1. Chronic interstitial changes bilaterally. 2. No focal airspace opacity or pleural effusion. 3. Stable cardiomegaly.     CBC:   Lab Results   Component Value Date    WBC 5.0 2020    RBC 3.67 2020    HGB 9.4 2020    HCT 30.6 2020    MCV 83.4 2020    MCH 25.6 2020    MCHC 30.7 2020    RDW 14.4 2020     2020    MPV 9.4 2020     BMP:    Lab Results   Component Value Date     2020     2020    K 4.9 2020    K 4.8 2020     2020     2020    CO2 22 2020    CO2 21 2020    BUN 43 2020

## 2020-02-20 NOTE — PROGRESS NOTES
Neurosurg progress note  VITALS:  BP (!) 96/44   Pulse 57   Temp 97.3 °F (36.3 °C) (Temporal)   Resp 22   Ht 5' 7\" (1.702 m)   Wt 132 lb 3.2 oz (60 kg)   SpO2 94%   BMI 20.71 kg/m²   24HR INTAKE/OUTPUT:    Intake/Output Summary (Last 24 hours) at 2020 1601  Last data filed at 2020 1300  Gross per 24 hour   Intake 580 ml   Output 1037 ml   Net -457 ml     Xr Pelvis (1-2 Views)    Result Date: 2020  Patient MRN:  [id number] : 3/9/1927 Age: 80 years Gender: Male Order Date:  2020 8:30 PM EXAM: XR PELVIS (1-2 VIEWS) NUMBER OF IMAGES: views INDICATION:  trauma trauma COMPARISON: None The bones appear to be in anatomic alignment. No evidence of displaced fracture. Multiple clips overlie the groin and lower abdomen. No evidence of displaced fracture. Xr Shoulder Right (min 2 Views)    Result Date: 2020  Patient MRN:  [id number] : 3/9/1927 Age: 80 years Gender: Male Order Date:  2020 6:30 AM EXAM: XR SHOULDER RIGHT (MIN 2 VIEWS) NUMBER OF IMAGES:  5 INDICATION:  R shoulder pain, h/o multiple falls COMPARISON: Right shoulder radiographs were 2020 RESULT: No fracture or dislocation. Superior subluxation of the humeral head with narrowing of the acromiohumeral interval compatible with chronic full-thickness rotator cuff tear. Moderate to severe degenerative changes of the acromioclavicular joint. Mild degenerative changes of the glenohumeral joint. Osteopenia. 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 Status post median sternotomy with a fracture sternal wire, unchanged since prior exam.     No acute osseous abnormality. Findings compatible with chronic full-thickness rotator cuff tear. 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     Ct Head Wo Contrast    Result Date: 2020  PROCEDURE INFORMATION: Exam: CT Head Without Contrast Exam date and time: 2020 11:00 PM Age: 80years old Clinical indication: Other: Intercranial hemmorrhage; Additional info: Follow up bilateral acute on chronic subdural hematomas TECHNIQUE: Imaging protocol: Computed tomography of the head without contrast. Radiation optimization: All CT scans at this facility use at least one of these dose optimization techniques: automated exposure control; mA and/or kV adjustment per patient size (includes targeted exams where dose is matched to clinical indication); or iterative reconstruction. COMPARISON: CT HEAD WO CONTRAST 2020 7:16 PM FINDINGS: Brain: Age consistent cerebral and cerebellar atrophy. Age consistent periventricular white matter abnormality. Bilateral mixed density subdural hematomas again noted. Right-sided subdural hematoma measures up to 2.0 cm in thickness. This measurement was performed on the coronal views. No significant change of the amount or characteristic of blood within the right subdural space. Moderate mass effect is unchanged. The smaller mixed density left subdural hematoma is also unchanged and is also best evaluated on the coronal images. No mass effect on the left side subdural hematoma. No intraparenchymal hemorrhage. Ventricles: Normal. No ventriculomegaly. Bones/joints: Normal. Sinuses: Visualized sinuses are unremarkable. No fluid levels. Mastoid air cells: Visualized mastoid air cells are well aerated. Soft tissues: Unremarkable. Report of prior examination is not available. This is usually provided. Bilateral mixed density subdural hematomas are unchanged as described above. The moderate mass-effect from the right subdural hematoma is stable. No intraparenchymal hemorrhage. If patient's neurologic status deteriorates recommend correlation with MRI.  This report has been electronically signed by Shawn Chapa MD.    Ct Head Wo Contrast    Result Date: 2020  Patient MRN:  [id number] : 3/9/1927 Age: 80 years Gender: Male Order Date:  2020 7:00 PM TECHNIQUE/NUMBER OF IMAGES/COMPARISON/CLINICAL HISTORY: CT brain Axial images were obtained sagittal and coronal reconstructions 153 images Comparison January 12 80year-old male patient history intracranial hemorrhage. FINDINGS: Presence of bilateral subdural hemorrhages. The they have a mixed density area with areas of isointensity to the brain parenchyma and hyperdensity in some areas of hypodensity as well. The they tend to form a CSF fluid level bilaterally. The subdural hematoma is more prominent on the right side measuring up to 15 mm. On the left side measures up to 5 mm are. The they are new development since the previous examination October 10 6. Midline shift at the level of the anterior septal pellucidum is to the left of approximately 2 mm are. There is no indication for intraparenchymal hemorrhage. There is no intraventricular hemorrhage. There is no monique subarachnoid hemorrhage. There is no evidence for a sizable area of an acute or recent insult in progression to the brain parenchyma. The discrete hypodensities seen in the white matter can be relate with chronic small vessel changes in the were seen previously. Images with bone window settings demonstrate no significant findings     Bilateral subdural hematomas, large on the right side of mixed density with areas of more recent or acute bleed. Preliminary report given to Dr. Neli Rosales. ER Physician. ALERT:  ABNORMAL REPORT. Ct Cervical Spine Wo Contrast    Result Date: 2/14/2020  PROCEDURE INFORMATION: Exam: CT Cervical Spine Without Contrast Exam date and time: 2/13/2020 11:00 PM Age: 80years old Clinical indication: Injury or trauma; Fall;  Follow-up exam; Bleeding/hemorrhage TECHNIQUE: Imaging protocol: Computed tomography images of the cervical spine without contrast. Radiation optimization: All CT scans at this facility use at least one of these dose optimization techniques: automated exposure control; mA and/or kV adjustment per patient size (includes targeted exams where dose is matched to clinical indication); or iterative reconstruction. COMPARISON: CT CERVICAL SPINE WO CONTRAST 2020 9:48 AM FINDINGS: Vertebrae: No vertebral fracture. Mild spinal canal stenosis secondary to the chronic degenerative changes. Discs/Spinal canal/Neural foramina: Severe Degenerative change with osteophyte formation and disc space narrowing from the C2-C3 level through the C6-C7 level. Multilevel facet arthropathy noted. Soft tissues: Partial calcification of the nuchal ligament. This is of no clinical significance. Lungs: There is biapical pleural thickening consistent with chronic inflammation. Vasculature: There is atherosclerotic calcification of the carotid arteries. Severe degenerative changes as described above. No fracture. This report has been electronically signed by Joni Ormond MD.    Xr Chest Portable    Result Date: 2020  Patient MRN:  [id number] : 3/9/1927 Age: 80 years Gender: Male Order Date:  2020 7:00 PM EXAM: XR CHEST PORTABLE COMPARISON: 2020 INDICATION:  ams, falls ams, falls FINDINGS: There are chronic interstitial changes bilaterally. There is stable mild cardiomegaly. No focal airspace opacity or pleural effusion. There are median sternotomy wires. No pneumothorax. 1. Chronic interstitial changes bilaterally. 2. No focal airspace opacity or pleural effusion. 3. Stable cardiomegaly.     CBC:   Lab Results   Component Value Date    WBC 4.3 2020    RBC 3.99 2020    HGB 10.4 2020    HCT 33.8 2020    MCV 84.7 2020    MCH 26.1 2020    MCHC 30.8 2020    RDW 14.2 2020     2020    MPV 9.7 2020     BMP:    Lab Results   Component Value Date     2020    K 4.7 2020     2020    CO2 19 2020    BUN 35 2020    LABALBU 2.7 2020    LABALBU 4.2 2012    CREATININE 2.3 2020    CALCIUM 8.1 2020    GFRAA 32 2020    LABGLOM 27 2 weeks

## 2020-02-20 NOTE — PROGRESS NOTES
Occupational Therapy  OT BEDSIDE TREATMENT NOTE                       Date:2020  Patient Name: Katharina Paiz  MRN: [id number]  : 3/9/1927  Room: [address] [place] [address]-A      Modified [place] Scale   Score     Description  0             No symptoms  1             No significant disability despite symptoms  2             Slight disability; able to look after own affairs  3             Moderate disability; able to ambulate without assist/ requires assist with ADLs  4             Moderate/Severe disability;requires assist to ambulate/assist with ADLs  5             Severe disability;bedridden/incontinent   6               Score:   4     Evaluating OT: Asad Medina, OTR/L  -Pt re-evaluated s/p asif hole procedure  Referring Provider: CINDY Wallace - CNS     AM-PAC Daily Activity Raw Score:   Recommended Adaptive Equipment: continue to assess for bathroom DME, ww     Comments: Based on patient's functional performance as stated above and level of assistance needed prior to admission, this therapist believes that the patient would benefit from further skilled [place] [address] hospital stay in an effort to increase safety, functional independence, and quality of life.     Diagnosis: Subdural hematoma ([address] [S06.5X9A]  Bilateral subdural hematomas ([address] [S06.5X9A]    Surgery: [place] hole evacuation of a right frontoparietal SDH  Pertinent Medical History: acute MI, atrial arrhythmia, bradycardia, CAD, hemiparesis, HTN, moderate aortic stenosis     Precautions:  Falls, mild [place], bed alarm, R shoulder pain- rotator cuff injury (no intervention per ortho, f/u outpt), SBP goal <160,     Home Living: Pt lives alone in a 1 story with 2 step(s) to enter and 1 rail(s); bed/bath on main floor. Bathroom setup: pt reports he sponge bathes at baseline  Equipment owned: ww     Prior Level of Function: independent with ADLs and assistance with IADLs; using ww for ambulation.  Pt reports nieces come in to assist with food, laundry, shopping, and transportation  Driving: no  Occupation: retired from jewelry store     Pain Level: 7/10 headache/ upper back/neck following ambulation; 2/10 headache once positioned into supine  Cognition: A&O: 4/4; Follows 2-3 step directions              Memory: F-; P immediate word recall 2/3              Sequencing: F              Problem solving: F              Judgement/safety: F at ww level; cues for hand placement, ww positioning.  Increased awareness of situation- asking about discharge planning              RASS: 0  CAM-ICU: Negative     Functional Assessment:    RE-Eval Status  Date: 2/14/20 Treatment Status  Date: 2/20/20 STG=LTG (~5-14  days)      Feeding Set up A         improve self feeding task to independent   Grooming Mod A overall  To brush teeth, wipe face, and wash hands in standing; Assistance for packaging mgmt d/t FM impairment; mod A for standing balance (posterior lean) with fatigue, corrected with cues to CGA  Min A  Observed prior to session using electric razor bed level;   Per last tx session; declined this date d/t headache    improve grooming/hygiene tasks to SBA while standing at sink    UB Dressing Min A     Min A  Seated EOB donning gown like jacket; educated on compensatory techniques d/t R shoulder ROM limitation/pain, F carryover.    improve UB dressing to mod I   LB Dressing Min A overall  SBA to doff/norma socks while seated at bedside chair  Min A  For dyn standing balance during gown mgmt  improve LB dressing to SBA with AE PRN   Bathing Min A  NT    improve UB/LB bathing to SBA   Toileting Min A overall  Min A  Simulation; for dyn balance during gown mgmt    improve toileting task and all clothing mgmt to Supervision   Bed Mobility  Supine to sit: Min A  Sit to supine: NT  Max cues for appropriate sequencing  Supine to sit: SBA  Sit to supine: SBA    improve bed mobility to SBA in prep for EOB ADL tasks   Functional Transfers Sit to stand: Min A  Stand to sit: Min 76518 5    Neuro Re-ed [address]       Group Therapy        Orthotic manage/training  [address]       Non-Billable Time       Total Timed Treatment 30 2         Lavone Metro OTR/L   License #  [id number]

## 2020-02-20 NOTE — PROGRESS NOTES
Admit Date: 2/13/2020      Subjective:     Patient: no acute issues reported overnight  Medication side effects: none    Scheduled Meds:   isosorbide mononitrate  120 mg Oral Daily    lisinopril  10 mg Oral Daily    ceFAZolin  1,000 mg Intravenous Q12H    ceFAZolin  500 mg Intravenous See Admin Instructions    tamsulosin  0.4 mg Oral Daily    gabapentin  200 mg Oral TID    sodium bicarbonate  650 mg Oral BID    docusate sodium  100 mg Oral Daily    patiromer sorbitex calcium  8.4 g Oral Daily    levETIRAcetam  750 mg Oral BID     Continuous Infusions:  PRN Meds:HYDROcodone 5 mg - acetaminophen, magnesium hydroxide, ondansetron, acetaminophen, hydrALAZINE, labetalol    Objective:   I/O last 3 completed shifts: In: 1702.3 [P.O.:420;  I.V.:1182.3; IV Piggyback:100]  Out: 1435 [Urine:1335; Drains:100]  I/O this shift:  In: -   Out: 97 [Urine:97]    BP (!) 160/57   Pulse (!) 44   Temp 98.3 °F (36.8 °C) (Oral)   Resp 20   Ht 5' 7\" (1.702 m)   Wt 132 lb 3.2 oz (60 kg)   SpO2 97%   BMI 20.71 kg/m²   General appearance: no distress  Skin:negative  Heent:negative  Lungs: clear to auscultation bilaterally  Heart: irregularly irregular rhythm  Abdomen: soft, non-tender; bowel sounds normal; no masses,  no organomegaly  Extremities:no edema  Neurologic: follows commands    Labs:  CBC with Differential:    Lab Results   Component Value Date    WBC 4.3 02/20/2020    RBC 3.99 02/20/2020    HGB 10.4 02/20/2020    HCT 33.8 02/20/2020     02/20/2020    MCV 84.7 02/20/2020    MCH 26.1 02/20/2020    MCHC 30.8 02/20/2020    RDW 14.2 02/20/2020    SEGSPCT 63 03/19/2012    LYMPHOPCT 13.3 02/15/2020    MONOPCT 11.0 02/15/2020    BASOPCT 0.2 02/15/2020    MONOSABS 0.72 02/15/2020    LYMPHSABS 0.87 02/15/2020    EOSABS 0.22 02/15/2020    BASOSABS 0.01 02/15/2020     BMP:    Lab Results   Component Value Date     02/20/2020    K 4.7 02/20/2020     02/20/2020    CO2 19 02/20/2020    BUN 35 02/20/2020 LABALBU 2.7 02/20/2020    LABALBU 4.2 03/19/2012    CREATININE 2.3 02/20/2020    CALCIUM 8.1 02/20/2020    GFRAA 32 02/20/2020    LABGLOM 27 02/20/2020     PT/INR:    Lab Results   Component Value Date    PROTIME 11.8 02/20/2020    PROTIME 16.8 04/16/2012    INR 1.0 02/20/2020     Last 3 Troponin:    Lab Results   Component Value Date    TROPONINI 0.10 02/18/2020    TROPONINI 0.05 02/13/2020    TROPONINI 0.06 01/16/2020     TSH:    Lab Results   Component Value Date    TSH 1.870 02/13/2020      Assessment:     1. Chronic right frontoparietal SH s/p [place] hole evacuation   2. Palliative care encounter  3. CKD stage IV  4. CAD s/p CABG  5. Persistent atrial fibrillation/flutter with chronic slow VR with no prolonged pauses or heart block  6. Moderate aortic stenosis  7.  HTN    Plan:       Continue same plan and orders

## 2020-02-20 NOTE — PROGRESS NOTES
transfer 4/7   Unsupported sitting  4/7   Sit to stand transfers 4/7   Ambulation 2/7   Total  18/35     Therapeutic Exercises:  Functional activity     Patient education  Pt educated on safety, sequencing of transfers, gait pattern, extended step of LLE    Patient response to education:   Pt verbalized understanding Pt demonstrated skill Pt requires further education in this area   Yes  With cues  Yes      ASSESSMENT:    Comments:  Pt received supine in bed and agreeable to PT session with OT collaboration. RN cleared pt for participation prior to session. Pt limited by fatigue and c/o headache. Ambulation distance increased and gait pattern improved with cueing. Pt still leaning to L and having difficulty advancing LLE but better from previous session. X 1 standing rest break needed. Pt c/o 7/10 HA and request to return to supine. HA improved to 2/10 once supine. All needs met. Treatment:  Patient practiced and was instructed in the following treatment:     Patient education provided continuously throughout session for sequencing, safety maintenance, and improving any deficits found during the evaluation.  Standing x 3 minutes to increase activity tolerance.  Gait training with use of [address] cued to take larger step length and increase foot clearance with LLE. · Skilled positioning - Pt placed in the chair position with pillows utilized to facilitate upright posture, joint and skin integrity, and interaction with environment. PLAN:  Continue PT POC    Time in  1040  Time out  1110    Total Treatment Time 30 minutes     Evaluation Time includes thorough review of current medical information, gathering information on past medical history/social history and prior level of function, completion of standardized testing/informal observation of tasks, assessment of data and education on plan of care and goals.     CPT codes:  [] Gait training 27510 0 minutes  [] Manual therapy 25692 0 minutes  [x] Therapeutic activities 60574 30 minutes  [] Therapeutic exercises 60100 0 minutes  [] Neuromuscular reeducation 06768 0 minutes     Kamini Gomez, PT, DPT  [id number]

## 2020-02-20 NOTE — CARE COORDINATION
2/20 Harinder cont. In NSICU. Discharge plan still remains [employer]. Asuncion Hartman is following. Hens and Envelope in Soft Chart.  Sal Preciado RN CM

## 2020-02-20 NOTE — PROGRESS NOTES
[facility] MD Rubia Cruz, MD Guillermina Karimi, MD Samantha Sevilla, CNP   Silvana Melgoza, HAMZAH  Progress Note    2/20/2020    SUBJECTIVE:   2/15: Denies complaint. Good appetite. ROS unremarkable. 2/18: seen today , on RA , stable BP , creatinine stable   2/19 : sitting in chair  , stable vitals, good breakfast this am  2/20 : seen today , feels ok . Stable vitals . The drain is out     PROBLEM LIST:    Active Problems:    HTN (hypertension)    CAD (coronary artery disease)    RBBB (right bundle branch block with left anterior fascicular block)    Bradycardia    Chronic atrial fibrillation    Subdural hematoma (HCC)    Bilateral subdural hematomas (HCC)    AKIRA (acute kidney injury) ([facility]    Aortic stenosis    Severe protein-calorie malnutrition ([facility]  Resolved Problems:    * No resolved hospital problems. *         DIET:    DIET GENERAL;  Dietary Nutrition Supplements: Standard High Calorie Oral Supplement     MEDS (scheduled):    isosorbide mononitrate  120 mg Oral Daily    lisinopril  10 mg Oral Daily    ceFAZolin  1,000 mg Intravenous Q12H    ceFAZolin  500 mg Intravenous See Admin Instructions    tamsulosin  0.4 mg Oral Daily    gabapentin  200 mg Oral TID    sodium bicarbonate  650 mg Oral BID    docusate sodium  100 mg Oral Daily    patiromer sorbitex calcium  8.4 g Oral Daily    levETIRAcetam  750 mg Oral BID       MEDS (infusions):      MEDS (prn):   HYDROcodone 5 mg - acetaminophen, magnesium hydroxide, ondansetron, acetaminophen, hydrALAZINE, labetalol    PHYSICAL EXAM:     Patient Vitals for the past 24 hrs:   BP Temp Temp src Pulse Resp SpO2   02/20/20 0925 (!) 162/89 -- -- 52 18 98 %   02/20/20 0905 (!) 191/66 -- -- 55 17 99 %   02/20/20 0900 (!) 202/71 98.7 °F (37.1 °C) Temporal 54 -- 99 %   02/20/20 0800 (!) 189/70 -- -- 54 -- 98 %   02/20/20 0700 (!) 184/68 -- -- 54 -- 99 %   02/20/20 0600 (!) 155/56 -- -- (!) 48 -- 98 %   02/20/20 0500 (!) 165/67 -- -- 53 -- 98 %   02/20/20 0400 136/60 98.4 °F (36.9 °C) -- (!) 42 -- 95 %   02/20/20 0300 (!) 160/57 -- -- (!) 44 -- 97 %   02/20/20 0200 (!) 156/62 -- -- 54 -- 98 %   02/20/20 0100 (!) 165/61 -- -- 57 -- 95 %   02/20/20 0000 (!) 147/52 98.3 °F (36.8 °C) Oral 54 -- 96 %   02/19/20 2300 (!) 159/63 -- -- 56 -- 98 %   02/19/20 2200 (!) 157/61 -- -- 54 -- 97 %   02/19/20 2100 (!) 162/59 -- -- 54 -- 98 %   02/19/20 2000 (!) 155/58 97.8 °F (36.6 °C) -- 61 -- 96 %   02/19/20 1900 (!) 164/65 -- -- 55 -- 97 %   02/19/20 1800 (!) 157/61 -- -- 55 -- 96 %   02/19/20 1700 (!) 160/60 -- -- 60 -- 97 %   02/19/20 1600 (!) 162/58 98 °F (36.7 °C) Temporal 55 20 97 %   02/19/20 1500 (!) 160/59 -- -- 54 16 97 %   02/19/20 1400 (!) 141/51 -- -- 58 18 97 %   02/19/20 1300 (!) 162/64 -- -- 57 22 94 %   02/19/20 1200 (!) 187/70 -- -- 54 16 97 %   @      Intake/Output Summary (Last 24 hours) at 2/20/2020 1110  Last data filed at 2/20/2020 0900  Gross per 24 hour   Intake 910.85 ml   Output 1317 ml   Net -406.15 ml         Wt Readings from Last 3 Encounters:   02/19/20 132 lb 3.2 oz (60 kg)   02/10/20 129 lb (58.5 kg)   01/20/20 145 lb 1 oz (65.8 kg)       Constitutional:  in no acute distress  Oral: mucus membranes moist  Neck: no JVD  Cardiovascular: S1, S2 regular rhythm, no murmur,or rub  Respiratory:  No crackles, no wheeze  Gastrointestinal:  Soft, nontender, nondistended, NABS  Ext: no edema, feet warm  Skin: dry, no rash  Neuro: awake, alert, interactive      DATA:    Recent Labs     02/18/20  0345 02/20/20  0510   WBC 5.0 4.3*   HGB 9.4* 10.4*   HCT 30.6* 33.8*   MCV 83.4 84.7    158     Recent Labs     02/17/20  1654 02/18/20  0345 02/20/20  0510    139  137 137   K 6.4* 4.8  4.9 4.7   * 106  104 106   CO2 23 22  21* 19*   MG  --  2.0 2.0   PHOS  --  3.6 2.9   BUN 46* 43*  42* 35*   CREATININE 2.2* 2.3*  2.3* 2.3*   ALT  --  13 8   AST  --  27 43*   BILITOT  --  0.5 0.4   ALKPHOS  --  129 246*       Lab

## 2020-02-21 NOTE — PROGRESS NOTES
disease) ([facility] 05/10/2016    RBBB (right bundle branch block with left anterior fascicular block) 05/10/2016    Cellulitis of right lower extremity 05/10/2016    Acute on chronic renal insufficiency 05/10/2016    Cellulitis of right foot     Bradycardia        SDH--s/p asif hole crani--monitor neuro exam  Hypoalbuminemia/moderate protein calorie malnutrition--diet as tolerated  Chronic renal insuff--monitor BUN/Cr/UO  PT/OT evals  DVT risk--PCDs    Juana Cruz MD, FACS  2/21/2020  3:28 PM

## 2020-02-21 NOTE — PROGRESS NOTES
Occupational Therapy  OT BEDSIDE TREATMENT NOTE      Date:2020  Patient Name: Harinder Hughes  KNP: [id number]  : 3/9/1927  RWNI: [id number]      Modified [place]ado Scale   Score     Description  0             No symptoms  1             No significant disability despite symptoms  2             Slight disability; able to look after own affairs  3             Moderate disability; able to ambulate without assist/ requires assist with ADLs  4             Moderate/Severe disability;requires assist to ambulate/assist with ADLs  5             Severe disability;bedridden/incontinent   6               Score:   9     Evaluating OT: Ginger Valerio, OTR/L  -Pt re-evaluated s/p asif hole procedure  Referring Provider: CINDY Wallace     AM-PAC Daily Activity Raw Score:   Recommended Adaptive Equipment: continue to assess for bathroom DME, ww     Comments: Based on patient's functional performance as stated above and level of assistance needed prior to admission, this therapist believes that the patient would benefit from further skilled [place] [address] hospital stay in an effort to increase safety, functional independence, and quality of life.     Diagnosis: Subdural hematoma ([address] [S06.5X9A]  Bilateral subdural hematomas ([address] [S06.5X9A]    Surgery: [place] hole evacuation of a right frontoparietal SDH  Pertinent Medical History: acute MI, atrial arrhythmia, bradycardia, CAD, hemiparesis, HTN, moderate aortic stenosis     Precautions:  Falls, mild [place], bed alarm, R shoulder pain- rotator cuff injury (no intervention per ortho, f/u outpt), SBP goal <160,     Home Living: Pt lives alone in a 1 story with 2 step(s) to enter and 1 rail(s); bed/bath on main floor. Bathroom setup: pt reports he sponge bathes at baseline  Equipment owned: ww     Prior Level of Function: independent with ADLs and assistance with IADLs; using ww for ambulation.  Pt reports nieces come in to assist with food, laundry, shopping, and transportation  Driving: no  Occupation: retired from jewelry store     Pain Level: 0/10  Cognition: A&O: 4/4; Follows 2-3 step directions              Memory: F  Improved awareness of discharge planning              Sequencing: F              Problem solving: F at ww level during clothing retrieval tasks              Judgement/safety: F at ww level; continued cues for hand placement, ww positioning during functional tasks despite repetitive education                RASS: 0  CAM-ICU:NT     Functional Assessment:    RE-Eval Status  Date: 2/14/20 Treatment Status  Date: 2/21/20 STG=LTG (~5-14  days)      Feeding Set up A         improve self feeding task to independent   Grooming Mod A overall  To brush teeth, wipe face, and wash hands in standing; Assistance for packaging mgmt d/t FM impairment; mod A for standing balance (posterior lean) with fatigue, corrected with cues to CGA  SBA  Seated  Min A standing  Per last tx    improve grooming/hygiene tasks to SBA while standing at sink    UB Dressing Min A     Min A  Seated EOB donning gown like jacket; educated on compensatory techniques d/t R shoulder ROM limitation/pain, F carryover.   Per last tx    improve UB dressing to mod I   LB Dressing Min A overall  SBA to doff/norma socks while seated at bedside chair  Min A  For dyn standing balance during pants mgmt   -clothing retrieval from drawer at ww level, see comments improve LB dressing to SBA with AE PRN   Bathing Min A  NT    improve UB/LB bathing to SBA   Toileting Min A overall  Min A  Simulation; for dyn balance during pants mgmt ww level, safety cues hand placement on ww for balance support    improve toileting task and all clothing mgmt to Supervision   Bed Mobility  Supine to sit: Min A  Sit to supine: NT  Max cues for appropriate sequencing NT sitting upright in chair    improve bed mobility to SBA in prep for EOB ADL tasks   Functional Transfers Sit to stand: Min A  Stand to sit: Min A  Mod cues for hand balance, endurance and safety impacting independent participation in ADL/IADL tasks, functional transfers and functional mobility. Continue with current POC.      Treatment: OT services provided included instruction/training on: endurance training at ww level;  proper hand/feet placement, positioning/safety/fall prevention techniques during transfers and functional mobility within ADL routine, energy conservation/pacing/breathing techniques; ww/body positioning when reaching outside MINE, ww safety techniques.  Therapist provided skilled monitoring of HR, O2 saturation, blood pressure and patients response during treatment session. Pt additionally educated on OT POC, increasing OOB activity/sitting in chair for meals, fall risk precautions/call light use, G understanding.      Vitals:   BP at rest: 104/62 BP at end of session: 134/52   HR at rest: 59 bpm HR at end of session: 67 bpm   Spo2 at rest: 96%  Spo2 at end of session: 96%      · Pt has made G progress towards set goals.    · Continue with current plan of care     Treatment Time ZS: 1969      BCDIKYDNB Time XYT: 4530           Treatment Charges: Mins Units   Ther Ex  48790     Manual Therapy 23082       Thera Activities 08501     ADL/Home Mgt 59607 25  2   Neuro Re-ed 27113       Group Therapy        Orthotic manage/training  90310       Non-Billable Time       Total Timed Treatment 25 2         La Lassiter OTR/L   License #  [id number]

## 2020-02-21 NOTE — PROGRESS NOTES
Neurosurg progress note  VITALS:  /64   Pulse 55   Temp 99.2 °F (37.3 °C) (Temporal)   Resp 22   Ht 5' 7\" (1.702 m)   Wt 132 lb 3.2 oz (60 kg)   SpO2 95%   BMI 20.71 kg/m²   24HR INTAKE/OUTPUT:    Intake/Output Summary (Last 24 hours) at 2020 0910  Last data filed at 2020 0840  Gross per 24 hour   Intake 1369 ml   Output 1010 ml   Net 359 ml     Xr Pelvis (1-2 Views)    Result Date: 2020  Patient MRN:  [id number] : 3/9/1927 Age: 80 years Gender: Male Order Date:  2020 8:30 PM EXAM: XR PELVIS (1-2 VIEWS) NUMBER OF IMAGES: views INDICATION:  trauma trauma COMPARISON: None The bones appear to be in anatomic alignment. No evidence of displaced fracture. Multiple clips overlie the groin and lower abdomen. No evidence of displaced fracture. Xr Shoulder Right (min 2 Views)    Result Date: 2020  Patient MRN:  [id number] : 3/9/1927 Age: 80 years Gender: Male Order Date:  2020 6:30 AM EXAM: XR SHOULDER RIGHT (MIN 2 VIEWS) NUMBER OF IMAGES:  5 INDICATION:  R shoulder pain, h/o multiple falls COMPARISON: Right shoulder radiographs were 2020 RESULT: No fracture or dislocation. Superior subluxation of the humeral head with narrowing of the acromiohumeral interval compatible with chronic full-thickness rotator cuff tear. Moderate to severe degenerative changes of the acromioclavicular joint. Mild degenerative changes of the glenohumeral joint. Osteopenia. 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 Status post median sternotomy with a fracture sternal wire, unchanged since prior exam.     No acute osseous abnormality. Findings compatible with chronic full-thickness rotator cuff tear. 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     Ct Head Wo Contrast    Result Date: 2020  PROCEDURE INFORMATION: Exam: CT Head Without Contrast Exam date and time: 2020 11:00 PM Age: 80years old Clinical indication: Other: Intercranial hemmorrhage; Additional info: Follow up bilateral acute on chronic subdural hematomas TECHNIQUE: Imaging protocol: Computed tomography of the head without contrast. Radiation optimization: All CT scans at this facility use at least one of these dose optimization techniques: automated exposure control; mA and/or kV adjustment per patient size (includes targeted exams where dose is matched to clinical indication); or iterative reconstruction. COMPARISON: CT HEAD WO CONTRAST 2020 7:16 PM FINDINGS: Brain: Age consistent cerebral and cerebellar atrophy. Age consistent periventricular white matter abnormality. Bilateral mixed density subdural hematomas again noted. Right-sided subdural hematoma measures up to 2.0 cm in thickness. This measurement was performed on the coronal views. No significant change of the amount or characteristic of blood within the right subdural space. Moderate mass effect is unchanged. The smaller mixed density left subdural hematoma is also unchanged and is also best evaluated on the coronal images. No mass effect on the left side subdural hematoma. No intraparenchymal hemorrhage. Ventricles: Normal. No ventriculomegaly. Bones/joints: Normal. Sinuses: Visualized sinuses are unremarkable. No fluid levels. Mastoid air cells: Visualized mastoid air cells are well aerated. Soft tissues: Unremarkable. Report of prior examination is not available. This is usually provided. Bilateral mixed density subdural hematomas are unchanged as described above. The moderate mass-effect from the right subdural hematoma is stable. No intraparenchymal hemorrhage. If patient's neurologic status deteriorates recommend correlation with MRI.  This report has been electronically signed by Gale Bowen MD.    Ct Head Wo Contrast    Result Date: 2020  Patient MRN:  [id number] : 3/9/1927 Age: 80 years Gender: Male Order Date:  2020 7:00 PM TECHNIQUE/NUMBER OF IMAGES/COMPARISON/CLINICAL 02/21/2020    GLUCOSE 84 02/21/2020    GLUCOSE 120 03/19/2012      isosorbide mononitrate  120 mg Oral Daily    lisinopril  10 mg Oral Daily    tamsulosin  0.4 mg Oral Daily    gabapentin  200 mg Oral TID    sodium bicarbonate  650 mg Oral BID    docusate sodium  100 mg Oral Daily    patiromer sorbitex calcium  8.4 g Oral Daily    levETIRAcetam  750 mg Oral BID     Awake and alert, drain out follows commands perrl eomi motor nonfocal  Assessment:  Patient Active Problem List   Diagnosis    HTN (hypertension)    CAD (coronary artery disease)    PVD [facility]ipheral vascular [facility]) [facility]    RBBB (right bundle branch block with left anterior fascicular block)    Cellulitis of right lower extremity    Acute on chronic renal insufficiency    Cellulitis of right foot    Bradycardia    Essential hypertension    Lumbar spondylosis    Benign prostatic hyperplasia without lower urinary tract symptoms    Stage 4 chronic kidney disease (HCC)    Vitamin D deficiency    Chronic atrial fibrillation    Orthostatic hypotension    Subdural hematoma (HCC)    Bilateral subdural hematomas (HCC)    AKIRA (acute kidney injury) ([facility]    Aortic stenosis    Severe protein-calorie malnutrition (HCC)     Plan:Continue current care  Marlee Soria M.D.

## 2020-02-21 NOTE — PROGRESS NOTES
Order for discharge to outside facility placed by Dr. Rosas Kapoor. Transportation by [facility] scheduled for 1800 2/21/2020. Called nurse to nurse report to [facility] and spoke to [facility]. Joe CONRAD, at bedside and notified of discharge and transport. Stated that she will meet the patient at [facility] after discharge/arrival.     Hema Funes NP notified of pending discharge.      Electronically signed by Sonido Hunter RN on 2/21/2020 at 2:42 PM

## 2020-02-21 NOTE — DISCHARGE INSTR - COC
extremity L03.115    Acute on chronic renal insufficiency N28.9, N18.9    Cellulitis of right foot L03.115    Bradycardia R00.1    Essential hypertension I10    Lumbar spondylosis M47.816    Benign prostatic hyperplasia without lower urinary tract symptoms N40.0    Stage 4 chronic kidney disease (HCC) N18.4    Vitamin D deficiency E55.9    Chronic atrial fibrillation I48.20    Orthostatic hypotension I95.1    Subdural hematoma (HCC) S06.5X9A    Bilateral subdural hematomas (HCC) S06.5X9A    AKIRA (acute kidney injury) ([facility] [address] N17.9    Aortic stenosis I35.0    Severe protein-calorie malnutrition (HCC) E43       Isolation/Infection:   Isolation          No Isolation        Patient Infection Status     None to display          Nurse Assessment:  Last Vital Signs: BP (!) 110/55   Pulse 60   Temp 98.5 °F (36.9 °C) (Temporal)   Resp 17   Ht 5' 7\" (1.702 m)   Wt 132 lb 3.2 oz (60 kg)   SpO2 97%   BMI 20.71 kg/m²     Last documented pain score (0-10 scale): Pain Level: 0  Last Weight:   Wt Readings from Last 1 Encounters:   02/19/20 132 lb 3.2 oz (60 kg)     Mental Status:  oriented, alert, coherent, logical, thought processes intact and able to concentrate and follow conversation    IV Access:  - None    Nursing Mobility/ADLs:  Walking   Assisted  Transfer  Assisted  Bathing  Assisted  Dressing  Assisted  Toileting  Assisted  Feeding  Independent  Med Admin  Independent  Med Delivery   whole    Wound Care Documentation and Therapy:  Wound 01/16/20 Shoulder Left (Active)   Wound Skin Tear 2/21/2020 10:00 AM   Dressing Changed Other (Comment) 1/21/2020  8:00 AM   Dressing/Treatment Open to air 2/21/2020 12:00 PM   Wound Cleansed Soap and water 2/21/2020 10:00 AM   Dressing Change Due 01/25/20 1/21/2020  8:00 AM   Wound Length (cm) 2.4 cm 2/17/2020 11:08 AM   Wound Width (cm) 1 cm 2/17/2020 11:08 AM   Wound Depth (cm) 0.01 cm 2/17/2020 11:08 AM   Wound Surface Area (cm^2) 2.4 cm^2 2/17/2020 11:08 AM   Change in Wound Size % (l*w) 94.29 2/17/2020 11:08 AM   Wound Volume (cm^3) 0.02 cm^3 2/17/2020 11:08 AM   Wound Healing % 100 2/17/2020 11:08 AM   Wound Assessment Clean;Brown;Red 2/21/2020 12:00 PM   Drainage Amount None 2/21/2020 12:00 PM   Drainage Description Serosanguinous 1/21/2020  8:00 AM   Odor None 2/21/2020 12:00 PM   Margins Unattached edges 2/20/2020  6:00 AM   Roxanne-wound Assessment Clean;Dry; Intact 2/21/2020 12:00 PM   Red%Wound Bed 100 1/21/2020  8:00 AM   Number of days: 35       Wound 01/16/20 Hand Anterior; Left (Active)   Wound Skin Tear 1/21/2020  8:00 AM   Dressing Status Changed 1/16/2020  7:48 PM   Dressing Changed Changed/New 1/16/2020  7:48 PM   Dressing/Treatment Open to air 2/20/2020  6:00 AM   Wound Cleansed Soap and water; Wound cleanser 2/18/2020  8:00 PM   Wound Length (cm) 0.6 cm 1/17/2020  2:17 PM   Wound Width (cm) 0.6 cm 1/17/2020  2:17 PM   Wound Depth (cm) 0.1 cm 1/17/2020  2:17 PM   Wound Surface Area (cm^2) 0.36 cm^2 1/17/2020  2:17 PM   Wound Volume (cm^3) 0.04 cm^3 1/17/2020  2:17 PM   Wound Assessment Dry;Clean; Intact; Red 1/21/2020  8:00 AM   Drainage Amount None 1/21/2020  8:00 AM   Drainage Description Serous 1/16/2020  3:30 PM   Odor None 1/21/2020  8:00 AM   Margins Unattached edges 1/16/2020  3:30 PM   Roxanne-wound Assessment Ecchymosis; Intact 1/21/2020  8:00 AM   Red%Wound Bed 100 1/21/2020  8:00 AM   Number of days: 35       Wound 02/14/20 Knee Anterior;Right (Active)   Wound Traumatic 2/21/2020 10:00 AM   Dressing Status Other (Comment) 2/21/2020 12:00 PM   Dressing Changed Changed/New 2/14/2020  6:00 AM   Dressing/Treatment Open to air 2/21/2020 12:00 PM   Wound Cleansed Soap and water 2/21/2020 10:00 AM   Wound Length (cm) 2 cm 2/14/2020  2:40 AM   Wound Width (cm) 2 cm 2/14/2020  2:40 AM   Wound Surface Area (cm^2) 4 cm^2 2/14/2020  2:40 AM   Wound Assessment Clean;Pale;Yellow 2/21/2020 12:00 PM   Drainage Amount None 2/21/2020 12:00 PM   Odor None 2/21/2020 12:00 PM Roxanne-wound Assessment Clean;Dry; Intact 2/21/2020 12:00 PM   Number of days: 7        Elimination:  Continence:   · Bowel: Yes  · Bladder: Yes  Urinary Catheter: None   Colostomy/Ileostomy/Ileal Conduit: No       Date of Last BM:       Intake/Output Summary (Last 24 hours) at 2/21/2020 1412  Last data filed at 2/21/2020 1300  Gross per 24 hour   Intake 1609 ml   Output 855 ml   Net 754 ml     I/O last 3 completed shifts: In: 7950 [P.O.:1040; I.V.:279; IV Piggyback:50]  Out: 1 [Urine:970]    Safety Concerns:     History of Falls (last 30 days) and At Risk for Falls    Impairments/Disabilities:      Hearing    Nutrition Therapy:  Current Nutrition Therapy:   - Oral Diet:  General    Routes of Feeding: Oral  Liquids: Thin Liquids  Daily Fluid Restriction: no  Last Modified Barium Swallow with Video (Video Swallowing Test): not done    Treatments at the Time of Hospital Discharge:   Respiratory Treatments: ***  Oxygen Therapy:  is not on home oxygen therapy.   Ventilator:    - No ventilator support    Rehab Therapies: Physical Therapy and Occupational Therapy  Weight Bearing Status/Restrictions: No weight bearing restirctions  Other Medical Equipment (for information only, NOT a DME order):  walker  Other Treatments:     Patient's personal belongings (please select all that are sent with patient):  {[facility] DME Belongings:[id number]}    RN SIGNATURE:  Electronically signed by Cuca Reddy RN on 2/21/20 at 2:27 PM    CASE MANAGEMENT/SOCIAL WORK SECTION    Inpatient Status Date: ***    Readmission Risk Assessment Score:  Readmission Risk              Risk of Unplanned Readmission:        23           Discharging to Facility/ Agency   · Name: [facility]  · Address:  · Phone:  · Fax:    Dialysis Facility (if applicable)   · Name:  · Address:  · Dialysis Schedule:  · Phone:  · Fax:    / signature: Electronically signed by JOSIE Villalba on 2/21/20 at 2:20 PM    PHYSICIAN SECTION    Prognosis:

## 2020-02-21 NOTE — PROGRESS NOTES
[facility] MD Gerry Noriega MD Betha Ricker, MD Andres Brow, JESUS Melgoza, HAMZAH  Progress Note    2/21/2020    SUBJECTIVE:   2/15: Denies complaint. Good appetite. ROS unremarkable. 2/18: seen today , on RA , stable BP , creatinine stable   2/19 : sitting in chair  , stable vitals, good breakfast this am  2/20 : seen today , feels ok . Stable vitals . The drain is out   2/21 : on RA . Stable BP . PROBLEM LIST:    Active Problems:    HTN (hypertension)    CAD (coronary artery disease)    RBBB (right bundle branch block with left anterior fascicular block)    Bradycardia    Chronic atrial fibrillation    Subdural hematoma (HCC)    Bilateral subdural hematomas (HCC)    AKIRA (acute kidney injury) ([facility] [address]    Aortic stenosis    Severe protein-calorie malnutrition ([facility] [address]  Resolved Problems:    * No resolved hospital problems. *         DIET:    DIET GENERAL;  Dietary Nutrition Supplements: Standard High Calorie Oral Supplement     MEDS (scheduled):    isosorbide mononitrate  120 mg Oral Daily    lisinopril  10 mg Oral Daily    tamsulosin  0.4 mg Oral Daily    gabapentin  200 mg Oral TID    sodium bicarbonate  650 mg Oral BID    docusate sodium  100 mg Oral Daily    patiromer sorbitex calcium  8.4 g Oral Daily    levETIRAcetam  750 mg Oral BID       MEDS (infusions):      MEDS (prn):   HYDROcodone 5 mg - acetaminophen, magnesium hydroxide, ondansetron, acetaminophen, hydrALAZINE, labetalol    PHYSICAL EXAM:     Patient Vitals for the past 24 hrs:   BP Temp Temp src Pulse Resp SpO2   02/21/20 1500 (!) 143/57 -- -- 52 18 99 %   02/21/20 1400 (!) 135/56 -- -- 56 16 96 %   02/21/20 1300 (!) 110/55 -- -- 60 17 97 %   02/21/20 1200 (!) 127/57 98.5 °F (36.9 °C) Temporal 56 19 98 %   02/21/20 1100 102/78 -- -- 60 23 98 %   02/21/20 1026 (!) 134/52 -- -- -- -- --   02/21/20 1015 104/62 -- -- -- -- --   02/21/20 1000 (!) 109/57 -- -- 52 18 97 %   02/21/20 0900 139/60 -- -- 58 16 97 %   02/21/20 0800 127/64 -- -- 55 22 95 %   02/21/20 0727 -- 99.2 °F (37.3 °C) Temporal 67 19 98 %   02/21/20 0700 (!) 148/61 -- -- 55 21 97 %   02/21/20 0600 120/61 97.8 °F (36.6 °C) Temporal 55 19 95 %   02/21/20 0500 (!) 126/52 -- -- 50 20 95 %   02/21/20 0400 (!) 123/58 97.2 °F (36.2 °C) Temporal (!) 48 23 96 %   02/21/20 0300 (!) 119/52 -- -- 50 17 95 %   02/21/20 0200 (!) 111/55 -- -- 51 17 95 %   02/21/20 0103 -- -- -- 51 -- --   02/21/20 0100 (!) 106/56 -- -- (!) 42 17 95 %   02/21/20 0000 (!) 103/42 97.4 °F (36.3 °C) Temporal (!) 39 17 97 %   02/20/20 2300 (!) 119/52 -- -- 51 16 97 %   02/20/20 2200 (!) 105/52 97.6 °F (36.4 °C) Temporal 56 16 98 %   02/20/20 2100 (!) 125/58 -- -- 66 16 96 %   02/20/20 2000 (!) 118/52 97.4 °F (36.3 °C) Temporal 56 20 96 %   02/20/20 1900 (!) 105/49 -- -- 51 20 97 %   02/20/20 1800 (!) 119/53 -- -- 57 21 96 %   02/20/20 1700 (!) 108/52 -- -- 57 18 97 %   @      Intake/Output Summary (Last 24 hours) at 2/21/2020 1623  Last data filed at 2/21/2020 1300  Gross per 24 hour   Intake 1609 ml   Output 830 ml   Net 779 ml         Wt Readings from Last 3 Encounters:   02/19/20 132 lb 3.2 oz (60 kg)   02/10/20 129 lb (58.5 kg)   01/20/20 145 lb 1 oz (65.8 kg)       Constitutional:  in no acute distress  Oral: mucus membranes moist  Neck: no JVD  Cardiovascular: S1, S2 regular rhythm, no murmur,or rub  Respiratory:  No crackles, no wheeze  Gastrointestinal:  Soft, nontender, nondistended, NABS  Ext: no edema, feet warm  Skin: dry, no rash  Neuro: awake, alert, interactive      DATA:    Recent Labs     02/20/20  0510 02/21/20  0516   WBC 4.3* 4.6   HGB 10.4* 8.4*   HCT 33.8* 27.8*   MCV 84.7 84.0    157     Recent Labs     02/20/20  0510 02/21/20  0516    136   K 4.7 4.4    106   CO2 19* 18*   MG 2.0 1.9   PHOS 2.9 3.5   BUN 35* 37*   CREATININE 2.3* 2.2*   ALT 8 <5   AST 43* 30   BILITOT 0.4 0.3   ALKPHOS 246* 184*       Lab Results   Component Value Date    LABPROT 1.4 (H) 01/18/2020    LABPROT 1.4 01/18/2020    LABPROT 1.5 (H) 01/07/2020    LABPROT 1.5 01/07/2020       ASSESSMENT / RECOMMENDATIONS:    -Chronic kidney disease stage IV baseline cr 2.3-2.9   -B/L subdural hematomas : Mr Luz Marina Christiansen has chosen no surgical intervention  -Multiple falls   -CAD s/p 2 bypasses in the past   -AF   -PVD   -HTN     Plan:   Creatinine stable at baseline .    Encourage good PO intake        Electronically signed by González Geronimo MD on 2/21/2020 at 4:23 PM

## 2020-02-21 NOTE — PROGRESS NOTES
to stand transfers 4/7   Ambulation 2/7   Total  18/35     Therapeutic Exercises:  Functional activity     Patient education  Pt educated on safety, gait and transfer training, fall prevention education     Patient response to education:   Pt verbalized understanding Pt demonstrated skill Pt requires further education in this area   Yes  With cues  Yes      ASSESSMENT:    Comments:  Pt received sitting in chiar and agreeable to PT session with OT collaboration. RN cleared pt for participation prior to session. Pt ambulated from chair to cabinet to obtain pants and gown and ambulated back to chair for seated ADLs. Ambulated in hallway. Has difficulty with 90 and 180 degree turns using [employer]. Poor safety during stand>sit. Returned to room and sat in chair. Pt felt \"flutter\" in his eyes. Rn notified. /52, HR 59 bpm, and SpO2 98%. Pt rested and ambulated again to cabinet. Performed dynamic standing tasks getting pillow cases from cabinet and returning to chair. Left sitting up in chair with call button and needs met. Pt is a fall risk and would benefit from further therapy to improve independence. Treatment:  Patient practiced and was instructed in the following treatment:     Patient education provided continuously throughout session for sequencing, safety maintenance, and improving any deficits observed   Standing >7 minutes to increase activity tolerance.  STS and pivot transfer training - pt educated on proper hand and foot placement, safety and sequencing, and use of an assistive device to safely complete sit<>stand and pivot transfers with hands on assistance to complete task safely     Gait training with use of [employer] cued to take larger step length and increase foot clearance with LLE.      PLAN:  Continue PT POC    Time in  1005  Time out  1040    Total Treatment Time 35 minutes     Evaluation Time includes thorough review of current medical information, gathering information on past medical

## 2020-02-21 NOTE — PROGRESS NOTES
[facility]  Critical Care  Daily Progress Note 2/21/2020    Date of Admission: 02/13/20  Date of ICU Admission:  02/17/20    CC: Follow up for bilateral Juliano Perez  02/13 Presented with AMS, head and neck pain. Trauma consulted dt hx of fall. Coumadin reversed with Vitamin K and Kcentra. 02/14 No significant events. Transferred to monitored floor. 02/17 Admitted to NSICU following bur hole craniotomy for SDH.    02/18 Developed SB during the night. Did require 4 doses of antihypertensive agent. 02/20  Drains removed this AM by Dr. Kayden Montenegro. Transfer ordered. 02/21  Afebrile. Vital signs stable. No issues overnight. Required 1 dose of hydralazine. PHYSICAL EXAM:  /64   Pulse 55   Temp 99.2 °F (37.3 °C) (Temporal)   Resp 22   Ht 5' 7\" (1.702 m)   Wt 132 lb 3.2 oz (60 kg)   SpO2 95%   BMI 20.71 kg/m²     Intake/Output Summary (Last 24 hours) at 2/21/2020 0843  Last data filed at 2/21/2020 0840  Gross per 24 hour   Intake 1369 ml   Output 1260 ml   Net 109 ml     General appearance:  Comfortable. Pain Description: none    NEUROLOGIC:   RASS Score:  0  GCS:  15  4 - Opens eyes on own   6 - Follows simple motor commands  5 - Alert and oriented       Pupil size:  Left 3 mm  Right 3 mm  Pupil reaction: Yes   PERRLA  Wiggles fingers: Left   Yes  Right Yes  Hand grasp:   Left: Yes     Right    Yes  Wiggles toes: Left   Yes Right  Yes  Plantar flexion: Left  Yes  Right   Yes  Facial droop:   none   Speech:  none  Crani incision:  CDI    CONSTITUTIONAL: No acute distress, lying in hospital bed. CARDIOVASCULAR: S1 S2, regular rate, regular rhythm, no murmur/gallop/rub. Monitor: SB.    PULMONARY: Bilaterally clear. No rhonchi/rales/wheezes, no use of accessory muscles. Room air. RENAL: Boucher to gravity, clear yellow urine. Fluid balance for previous 24 hours:  + 109 ml. ABDOMEN: Soft, nontender, nondistended, nontympanic, normal bowel sounds. Monitor BS.    MSK: Deconditioned.   ROM. Turn & reposition. PT & OT Am Pac 17/24. Monitor for skin breakdown. Heme: No acute issues. Anemia of chronic disease. Hx of warfarin use    Monitor CBC.       Bowel regime: Colace. MOM. Pain control/Sedation:  Tylenol. Norco.     DVT prophylaxis: SCD. No Lovenox/heparin due to SDH. GI prophylaxis: Diet. Ancillary consults: Medicine. Neurosurgery. Nephrology. Patient/Family update: Questions answered. Support given. Code status: Limited code.       Disposition:  NSICU. Transfer.       Electronically signed by Delores Curry RN MSN APRN-NP [facility] NP  CCNS CCRN 2/21/2020 8:49 AM

## 2020-02-21 NOTE — PROGRESS NOTES
[facility] transport at bedside to transport patient by wheelchair. Patient walked to wheelchair with assist. Family is to meet the patient at [facility]. Patient did not have any belongings and stated that his family took them home on admission.     Electronically signed by Tomas Morocho RN on 2/21/2020 at 6:15 PM

## 2020-02-21 NOTE — PROGRESS NOTES
Yuly Alfaro approved the patient to be transferred for rehab today or tomorrow per . Left message with Dr. Bello [facility] with information about [facility] [place] operated by [facility] and requested orders if Dr. Ethan Bear wants the patient to got to rehab before transferring to a telemetry unit.     Electronically signed by Melvin Hoffman RN on 2/21/2020 at 2:06 PM

## 2020-02-23 PROBLEM — R00.1 SYMPTOMATIC BRADYCARDIA: Status: ACTIVE | Noted: 2020-01-01

## 2020-02-23 NOTE — ED PROVIDER NOTES
Conjunctiva/sclera: Conjunctivae normal.      Pupils: Pupils are equal, round, and reactive to light. Neck:      Musculoskeletal: Normal range of motion and neck supple. Cardiovascular:      Rate and Rhythm: Bradycardia present. Rhythm irregular. Pulses: Normal pulses. Radial pulses are 2+ on the right side and 2+ on the left side. Dorsalis pedis pulses are 2+ on the right side and 2+ on the left side. Heart sounds: Murmur present. Pulmonary:      Effort: Pulmonary effort is normal.      Breath sounds: No wheezing, rhonchi or rales. Abdominal:      General: Bowel sounds are normal.      Palpations: Abdomen is soft. Abdomen is not rigid. There is no pulsatile mass. Tenderness: There is no abdominal tenderness. There is no guarding or rebound. Musculoskeletal:      Right lower leg: No edema. Left lower leg: No edema. Skin:     General: Skin is warm and dry. Capillary Refill: Capillary refill takes less than 2 seconds. Coloration: Skin is pale. Neurological:      General: No focal deficit present. Mental Status: He is alert and oriented to person, place, and time. Cranial Nerves: No cranial nerve deficit. Psychiatric:         Speech: Speech normal.          Procedures     MDM  Number of Diagnoses or Management Options  Chronic subdural hematoma ([address]):   Encephalopathy:   Symptomatic bradycardia:   Diagnosis management comments: Patient presents to the ED for AMS, although arrives A&Ox3. Patient's largest complaints were headache and lightheaded with recent history of subdural hematoma s/p asif hole. Patient arrived bradycardic. EKG showed concern for third degree AV block vs AV dissociation with variable block as p waves marched out. He had heart rate in 30s to 50s while in the ED. Case discussed with on-call EP and agreed on concerning EKG.  If patient were to be unstable, she recommended temporary placement of pacemaker, otherwise she recommends Differential   Result Value Ref Range    WBC 4.6 4.5 - 11.5 E9/L    RBC 3.28 (L) 3.80 - 5.80 E12/L    Hemoglobin 8.3 (L) 12.5 - 16.5 g/dL    Hematocrit 27.6 (L) 37.0 - 54.0 %    MCV 84.1 80.0 - 99.9 fL    MCH 25.3 (L) 26.0 - 35.0 pg    MCHC 30.1 (L) 32.0 - 34.5 %    RDW 14.0 11.5 - 15.0 fL    Platelets 206 595 - 730 E9/L    MPV 9.3 7.0 - 12.0 fL    Neutrophils % 61.5 43.0 - 80.0 %    Immature Granulocytes % 0.6 0.0 - 5.0 %    Lymphocytes % 22.5 20.0 - 42.0 %    Monocytes % 10.2 2.0 - 12.0 %    Eosinophils % 4.8 0.0 - 6.0 %    Basophils % 0.4 0.0 - 2.0 %    Neutrophils Absolute 2.85 1.80 - 7.30 E9/L    Immature Granulocytes # 0.03 E9/L    Lymphocytes Absolute 1.04 (L) 1.50 - 4.00 E9/L    Monocytes Absolute 0.47 0.10 - 0.95 E9/L    Eosinophils Absolute 0.22 0.05 - 0.50 E9/L    Basophils Absolute 0.02 0.00 - 0.20 E9/L   Comprehensive Metabolic Panel   Result Value Ref Range    Sodium 142 132 - 146 mmol/L    Potassium 4.7 3.5 - 5.0 mmol/L    Chloride 105 98 - 107 mmol/L    CO2 27 22 - 29 mmol/L    Anion Gap 10 7 - 16 mmol/L    Glucose 97 74 - 99 mg/dL    BUN 42 (H) 8 - 23 mg/dL    CREATININE 2.3 (H) 0.7 - 1.2 mg/dL    GFR Non-African American 27 >=60 mL/min/1.73    GFR African American 32     Calcium 8.4 (L) 8.6 - 10.2 mg/dL    Total Protein 5.8 (L) 6.4 - 8.3 g/dL    Alb 2.9 (L) 3.5 - 5.2 g/dL    Total Bilirubin 0.2 0.0 - 1.2 mg/dL    Alkaline Phosphatase 155 (H) 40 - 129 U/L    ALT <5 0 - 40 U/L    AST 22 0 - 39 U/L   Troponin   Result Value Ref Range    Troponin 0.09 (H) 0.00 - 0.03 ng/mL   Urinalysis   Result Value Ref Range    Color, UA Yellow Straw/Yellow    Clarity, UA Clear Clear    Glucose, Ur Negative Negative mg/dL    Bilirubin Urine Negative Negative    Ketones, Urine Negative Negative mg/dL    Specific Gravity, UA 1.025 1.005 - 1.030    Blood, Urine TRACE-INTACT Negative    pH, UA 5.5 5.0 - 9.0    Protein,  (A) Negative mg/dL    Urobilinogen, Urine 0.2 <2.0 E.U./dL    Nitrite, Urine Negative Negative Leukocyte Esterase, Urine Negative Negative   Protime-INR   Result Value Ref Range    Protime 11.7 9.3 - 12.4 sec    INR 1.0    APTT   Result Value Ref Range    aPTT 31.3 24.5 - 35.1 sec   Microscopic Urinalysis   Result Value Ref Range    WBC, UA 1-3 0 - 5 /HPF    RBC, UA 2-5 0 - 2 /HPF    Bacteria, UA RARE (A) None Seen /HPF       RADIOLOGY:    All Radiology results interpreted by Radiologist unless otherwise noted. CT Head WO Contrast   Final Result   Small bilateral subdural hematomas are not significantly changed. XR CHEST PORTABLE   Final Result   Very subtle interval increase in interstitial density in the lungs   could be mild edema, particularly given the history of sepsis. There is no evidence of organized pneumonia, pleural effusion, or CHF   at this time. Cardiomegaly and cardiac surgical changes are again documented. EKG: This EKG is signed and interpreted by ED Physician. Time: 1246  Rate: 39  Interpretation: Bradycardia, AV dissociation with abberancy  Comparison: changes compared to previous EKG.    ---------------------------- NURSING NOTES AND VITALS REVIEWED -------------------------   The nursing notes within the ED encounter and vital signs as below have been reviewed. BP (!) 176/76   Pulse 59   Temp 97.1 °F (36.2 °C) (Temporal)   Resp 16   Ht 5' 7\" (1.702 m)   Wt 135 lb (61.2 kg)   SpO2 97%   BMI 21.14 kg/m²   Oxygen Saturation Interpretation: Normal      ------------------------------------------PROGRESS NOTES -------------------------------------------    ED COURSE MEDICATIONS:              See MAR for details    CONSULTATIONS:            EP and Medicine. PROCEDURES:            none.       COUNSELING:   I have spoken with the patient and discussed todays results, in addition to providing specific details for the plan of care and counseling regarding the diagnosis and prognosis.     --------------------------------------- IMPRESSION & DISPOSITION --------------------------------     IMPRESSION(s):  1. Encephalopathy    2. Symptomatic bradycardia    3. Chronic subdural hematoma ([address]        This patient's ED course included: a personal history and physicial examination, cardiac monitoring and continuous pulse oximetry    This patient has been closely monitored during their ED course. DISPOSITION:  Disposition: Admit to telemetry. Patient condition is stable. END OF PROVIDER NOTE.        Mendez Oconnell DO  Resident  02/23/20 0753

## 2020-02-23 NOTE — PROGRESS NOTES
Cardiology consult called to dr. Papi Carrasco, covering    Ep consult called to dr. Ricco uFnes via perfect serve     Call placed to dr. wsift's answering service for admission orders for pt

## 2020-02-24 NOTE — PROGRESS NOTES
Spoke with dr. Ruth Ann Hyman, pt requesting a badi, dr. Wilian Kennedy would not like catheter for pt

## 2020-02-24 NOTE — H&P
[address]                  [address] [facility],  [facility]                              HISTORY AND PHYSICAL    PATIENT NAME: Javier Fregoso                    :        1927  MED REC NO:   [id number]                            ROOM:       8506  ACCOUNT NO:   [de-identified]                           ADMIT DATE: 2020  PROVIDER:     Miki Sanchez DO      CHIEF COMPLAINT AND REASON FOR THIS HOSPITAL ADMISSION:  Questionable  heart block. HISTORY OF PRESENT ILLNESS:  The patient is a 55-year-old  male  recently discharged from this hospital facility for chronic subdural  hematoma, requiring a asif hole evacuation. During his stay, he was  evaluated by Cardiology. The patient does have a history of chronic  atrial fibrillation with slow ventricular response. However, during his  last hospital stay, he demonstrated no evidence for heart block and/or  pauses. He was discharged to a skilled nursing facility for PT  purposes. He now returns back to this emergency room with onset of  altered mental status, sometime overnight complaining of  lightheadedness. In speaking with the emergency room physician, his  rhythm strips revealed the presence of questionable heart block. His  white blood cell count was 4.6, H and H of 8.3 and 27.6 respectively. Platelets were 449,924. His creatinine was 2.3, BUN was 42, potassium  was 4.7. UA was negative. CT of the brain revealed small bilateral  subdural hematomas. Chest x-ray revealed no evidence for pneumonia,  pleural effusion or CHF. He was admitted, assigned observation status. Both EP and Cardiology consultations were requested. MEDICATIONS:  His recent and present medications are noted. PAST SURGICAL HISTORY:  Consistent with aortocoronary bypass and recent  craniotomy. SOCIAL HISTORY:  The patient quit smoking 33 years ago. No use of  illicit drugs or alcohol.     FAMILY HISTORY:  Negative with regard to above chief complaint. ALLERGIES:  To LIPITOR. REVIEW OF SYSTEMS:  Remainder of systems is otherwise negative. PHYSICAL ASSESSMENT:  GENERAL:  Reveals an elderly male in no acute distress. VITAL SIGNS:  Blood pressure 176/76, pulse rate of 59, temperature of  97.9, respiratory rate of 16. ENT:  Exam is negative. CARDIAC:  Heart is irregularly irregular. RESPIRATORY:  The lungs demonstrate diminished breath sounds both lung  bases. GASTROINTESTINAL:  The abdomen is noted to be soft with positive bowel  sounds. EXTREMITIES:  Reveal no evidence for edema and/or cyanosis. ASSESSMENT:  1.  Questionable symptomatic atrial fibrillation with slow ventricular  response with EKG and monitor strips concerning for third-degree heart  block. 2.  Subdural hematoma, status post asif hole evacuation. 3.  Coronary artery disease, status post CABG. 4.  Chronic kidney disease. 5.  Hypertension. 6.  Moderate aortic stenosis. PLAN OF TREATMENT:  See orders. Appropriate consultations. The patient  is assigned to observation status. Discharge plan will be back to the  nursing home when medically stable.         Kevon Pride DO    D: 02/24/2020 6:56:15       T: 02/24/2020 6:59:55     [email or web address]  Job#: [id number]     Doc#: [id number]    CC:

## 2020-02-24 NOTE — CARE COORDINATION
Patient was admitted from [facility]. Spoke with Yadira Harp there, patient was there skilled, he is not a bedhold but they will take back on discharge. Will need therapy and a precert prior to discharge. Envelope and ambullette form on soft chart.

## 2020-02-24 NOTE — CONSULTS
[facility] [address] [place] [address]    Name: Addison Perez    Age: 80 y.o. Date of Admission: 2/23/2020 12:20 PM    Date of Service: 2/24/2020    Reason for Consultation: bradycardia    Referring Physician: Dr Oliver Gómez  Primary Care Physician: Nomi Felix DO    History of Present Illness: The patient is a 80y.o. year old male discharged to [facility] 2/21/2020 and readmitted to [facility] 2/23/2020. Just suffered a nonsyncopal fall resulting in bilateral subdural hematomas and had a asif hole craniotomy. Sent back to [facility] for headache and weakness. Was bradycardic rates 30's to 50' s without pauses. He has a history of bypass surgery 1985 and again redo in 1991, permanent A. fib/flutter, chronic kidney disease, hypertension, peripheral vascular disease with previous lower extremity bypass 2005. Echo 8/2019 cLVH normal EF 70% moderate AS, mild to mod MR/TR, AUDIE. He as recently also evaluated by EP for bradycardia and had a 4 week event recorder done 6 months ago- no pauses.     Past Medical History:   Past Medical History:   Diagnosis Date    [facility]     Arrhythmia atrial     Bradycardia     CAD (coronary artery disease)     [facility]iparesis [facility]     Hypertension     Moderate aortic stenosis 08/05/2019       Review of Systems:   Constitutional: No fever, chills, sweats. + weakness  Cardiac: As per HPI  Pulmonary: No cough, wheeze, hemoptysis  HEENT: No visual disturbances, difficult swallowing  GI: No nausea, vomiting, diarrhea, abdominal pain, rectal bleeding  : No dysuria or hematuria  Endocrine: No excessive thirst, heat or cold intolerance. Musculoskeletal: No joint pain or muscle aches. No claudication  Skin: No skin breakdown or rashes  Neuro:+ headache, confusion, no seizures  Psych: No depression, anxiety    Family History:  History reviewed. No pertinent family history.     Social History:  Social History     Socioeconomic History    Marital status: Single     Spouse Katy Buccino, DO   patiromer sorbitex calcium (VELTASSA) 8.4 g PACK packet Take 8.4 g by mouth daily   Yes Historical Provider, MD   gabapentin (NEURONTIN) 100 MG capsule Take 200 mg by mouth 3 times daily.    Yes Historical Provider, MD   isosorbide mononitrate (IMDUR) 60 MG extended release tablet Take 1 tablet by mouth 2 times daily 12/27/19  Yes Katy Buccino, DO   sodium bicarbonate 650 MG tablet Take 650 mg by mouth 2 times daily   Yes Historical Provider, MD   vitamin D (CHOLECALCIFEROL) 1000 UNIT TABS tablet Take 1,000 Units by mouth 2 times daily    Yes Historical Provider, MD       Current Medications:  Current Facility-Administered Medications   Medication Dose Route Frequency Provider Last Rate Last Dose    sodium chloride flush 0.9 % injection 10 mL  10 mL Intravenous 2 times per day Kamla Britton, DO   10 mL at 02/24/20 1006    sodium chloride flush 0.9 % injection 10 mL  10 mL Intravenous PRN Kamla Britton, DO        acetaminophen (TYLENOL) tablet 650 mg  650 mg Oral Q4H PRN Kamla Britton, DO        gabapentin (NEURONTIN) capsule 200 mg  200 mg Oral TID Katy Buccino, DO   200 mg at 02/24/20 1005    isosorbide mononitrate (IMDUR) extended release tablet 60 mg  60 mg Oral BID Katy Buccino, DO   60 mg at 02/24/20 1006    levETIRAcetam (KEPPRA) 100 MG/ML solution 750 mg  750 mg Oral BID Katy Buccino, DO   750 mg at 02/24/20 1006    lisinopril (PRINIVIL;ZESTRIL) tablet 10 mg  10 mg Oral Daily Katy Buccino, DO   10 mg at 02/24/20 1006    patiromer sorbitex calcium (VELTASSA) packet 8.4 g  8.4 g Oral Daily Katy Buccino, DO        sodium bicarbonate tablet 650 mg  650 mg Oral BID Katy Buccino, DO   650 mg at 02/24/20 1006    tamsulosin (FLOMAX) capsule 0.4 mg  0.4 mg Oral Daily Katy Buccino, DO   0.4 mg at 02/24/20 1011    vitamin D (CHOLECALCIFEROL) tablet 1,000 Units  1,000 Units Oral BID Katy Buccino, DO   1,000 Units at 02/24/20 1006         Physical Exam:  BP (!) 154/49   Pulse 52 pauses seen , but rates have dipped to 30's presently in the 50's        ASSESSMENT / PLAN:       #1.  Bradycardia-rate is mildly bradycardic at this time. No pauses identified. Avoid beta-blockers, rate slowing calcium channel blockers, or clonidine. EP consulted- they have been monitoring this past year. I'm not sure that pacemaker would alter his life expectancy or improve hi function, but will defer to EP.     #2. Permanent atrial fib/FL- rate currently controlled. No antiarrhythmics added nor required. #3. Anticoagulation status-do not restart anticoagulation. Given CAD and AF, would at least consider enteric-coated baby aspirin daily if felt appropriate by neurosurgery.     #4. CAD-no indication of unstable angina.     #5. Hypertension-blood pressure elevated at this time but was controlled yesterday. No new medication changes made. If hypertension is persistent could increase lisinopril further     #6. Hyperlipidemia- when adding a statin medication as outpatient given history of CAD.     #7. Moderate aortic stenosis-no acute issues. #8   Recent subdural hematoma, s/p asif hole. On keppra                             Thank you for consultation.     Alexia Mejia MD, [facility]  The [address] at [facility]    Electronically signed by Alexia Mejia MD on 2/24/2020 at 11:15 AM

## 2020-02-24 NOTE — CONSULTS
found to have atrial flutter with variable conduction heart rates in the 30's -40's. He completed a 30 day monitor in September that showed Atrial tachycardia with slow ventricular response at 40 to 50's bpm. His last admission was from 2/13/20 and 2/21 during which time he under went a [place] hole craniotomy, he was then discharged to a nursing facility for re hab. Cardiac electrophysiology service is consulted for Atrial flutter with variable degree block. Patient Active Problem List    Diagnosis Date Noted    Symptomatic bradycardia 02/23/2020    Aortic stenosis 02/18/2020    Severe protein-calorie malnutrition ([address] 02/18/2020    AKIRA (acute kidney injury) ([address] 02/14/2020    Subdural hematoma ([address] 02/13/2020    Bilateral subdural hematomas ([facility] 02/13/2020    Orthostatic hypotension 01/17/2020    Lumbar spondylosis 02/13/2019    Benign prostatic hyperplasia without lower urinary tract symptoms 02/13/2019    Stage 4 chronic kidney disease ([address] 02/13/2019    Vitamin D deficiency 02/13/2019    Chronic atrial fibrillation 02/13/2019    Essential hypertension     HTN (hypertension) 05/10/2016    CAD (coronary artery disease) 05/10/2016     Overview Note:     S/p CABG 1985,  Re-do 1991      PVD (peripheral vascular disease) ([address] 05/10/2016     Overview Note:     S/p BLE bypass 2005      RBBB (right bundle branch block with left anterior fascicular block) 05/10/2016    Cellulitis of right lower extremity 05/10/2016    Acute on chronic renal insufficiency 05/10/2016    Cellulitis of right foot     Bradycardia        Past Medical History:   Diagnosis Date    Acute MI ([address]     Arrhythmia atrial     Bradycardia     CAD (coronary artery disease)     Hemiparesis ([address]     Hypertension     Moderate aortic stenosis 08/05/2019       History reviewed. No pertinent family history.     Social History     Tobacco Use    Smoking status: Former Smoker     Packs/day: 0.50     Years: 42.00     Pack Cardiovascular: pertinent positives in HPI  Gastrointestinal: Negative for abdominal pain and blood in stool. All other review of systems are negative     PHYSICAL EXAM:   Vitals:    02/24/20 0730 02/24/20 1005 02/24/20 1533 02/24/20 1745   BP: (!) 149/66 (!) 154/49 (!) 188/60 (!) 190/62   Pulse: 52  54    Resp: 21  20    Temp: 98.1 °F (36.7 °C)  98 °F (36.7 °C)    TempSrc: Temporal  Temporal    SpO2: 95%  95%    Weight:       Height:          Constitutional: Well-developed, no acute distress, slightly confused   Eyes: conjunctivae normal, no xanthelasma   Ears, Nose, Throat: oral mucosa moist, no cyanosis   CV: no JVD. IRIR. Normal S1S2 and no S3. No murmurs, rubs, or gallops. PMI is nondisplaced  Lungs: clear to auscultation bilaterally, normal respiratory effort without used of accessory muscles  Abdomen: soft, non-tender, bowel sounds present, no masses or hepatomegaly   Musculoskeletal: no digital clubbing, no edema   Skin: warm, no rashes     I have personally reviewed the laboratory, cardiac diagnostic and radiographic testing as outlined below:    Data:    Recent Labs     02/23/20  1249   WBC 4.6   HGB 8.3*   HCT 27.6*        Recent Labs     02/23/20  1249      K 4.7      CO2 27   BUN 42*   CREATININE 2.3*   CALCIUM 8.4*      Lab Results   Component Value Date    MG 1.9 02/21/2020     No results for input(s): TSH in the last 72 hours. Recent Labs     02/23/20  1314   INR 1.0       CXR 2/23/20:     Findings: The lungs are symmetrically expanded, and show minimally increased   prominence of previously documented interstitial density, which could   be related to edema, perhaps even subtle ARDS change, given the   history of sepsis. There is no consolidation or effusion.       Cardiovascular shadows are prominent with evidence of prior cardiac   surgery and atherosclerotic aortic calcification, but no evidence of   acute decompensation. .       Skeletal structures show no evidence of moderate . Signature      ----------------------------------------------------------------   Electronically signed by Vijaya Knott MD(Interpreting   physician) on 08/05/2019 12:32 PM   ----------------------------------------------------------------      Outpatient Monitor:  30 Day MCOT 9/6/19 Atrial tachycardia with slow ventricular response at 40 to 50's bpm.      I have independently reviewed all of the ECGs and rhythm strips per above     Assessment/Plan: This is a 80 y.o. male with a history of   Patient Active Problem List   Diagnosis    HTN (hypertension)    CAD (coronary artery disease)    PVD (peripheral vascular disease) ([address]    RBBB (right bundle branch block with left anterior fascicular block)    Cellulitis of right lower extremity    Acute on chronic renal insufficiency    Cellulitis of right foot    Bradycardia    Essential hypertension    Lumbar spondylosis    Benign prostatic hyperplasia without lower urinary tract symptoms    Stage 4 chronic kidney disease (HCC)    Vitamin D deficiency    Chronic atrial fibrillation    Orthostatic hypotension    Subdural hematoma (HCC)    Bilateral subdural hematomas (HCC)    AKIRA (acute kidney injury) ([address]    Aortic stenosis    Severe protein-calorie malnutrition (HCC)    Symptomatic bradycardia    who presents with  maturation syncope and his chronic atrial fibrillation with slow ventricular responce. 1. Maturation syncope   - Syncope episode with urinating     2.  Permanent A.flutter/Fib with slow ventricular response  - Rates in to 35-60   - RBBB/LAFB present since 5/2016  - Thus far only one incidence of dizziness  - No Pauses greater than 5 sec   - no AV node blockers   - [id number] ( vascular disease, age, HTN)   - [address] on hold due to recent Subdural hematoma   - 2 prior DCCV     3. Subdural hematomas   - [place] hole Craniotomy 2/17/20    4. 2. CAD   - S/P CABG 1985, redo 1991  - Follow with the [address] [place] [address] Poli Dietz to Lipitor      3. HTN   - Uncontrolled   - Lisinopril, imdur, hydralazine      4. BPH   - Flomax      6. PVD   - S/p BLE bypass      7. CKD   - creat 2.3 yesterday      8. Moderate Aortic stenosis     Recommendations:  1. 30 day monitor to exclude bradyarrhythmias   2. Can consider ILR in future   3. No indication for Pacing therapy at this time. 4. EP to sign off please call if needed         Thank you for allowing me to participate in your patient's care. Please call me if there are any questions or concerns. Discussed with . CINDY Rodriguez - CNP  Cardiac Electrophysiology  [facility]) Physicians  [facility] [place]: [place] [facility]  5:53 PM  2/24/2020    ______________________________________________________________________    I have personally seen and evaluated the patient. I personally obtained the history and performed the physical exam. I personally reviewed all of the above labs and data. All of the assessments and recommendations are from me. I have reviewed the above documentation completed by the HA. Please see my additional contributions to the HPI, physical exam, and assessment, and recommendations below. History of Present Illness: The patient is a 80year-old gentleman with significant past medical history of CAD s/p CABG in 1985 with Redo in 1991, permanent AF/AFL, hypertension, CKD, PVD s/p BLE bypass in 2005 and chronic subdural hematomas s/p [place] hole evacuation on 2/17/20 who presented to the hospital complaining of syncope. The patient was recently admitted to the hospital from 2/13/20 to 2/21/20 after mechanical fall and subsequently diagnosed with bilateral subdural hematoma. He underwent right bur hole craniotomy and was discharged to a nursing facility for rehabilitation. Per patient yesterday while he was urinating, he became dizzy and then passed out.  He was transferred to [facility] ED where he was found to have atrial flutter with variable conduction at 38 bpm. Cardiac electrophysiology service is consulted for atrial flutter with variable degree block. The patient denies chest pain, dyspnea on exertion, palpitations, lightheadedness, PND, or orthopnea. ROS:   Constitutional: Negative for fever. Positive for activity change and negative for appetite change. HENT: Negative for epistaxis. Eyes: Negative for diploplia, blurred vision. Respiratory: Negative for cough, chest tightness, shortness of breath and wheezing. Cardiovascular: pertinent positives in HPI  Gastrointestinal: Negative for abdominal pain and blood in stool. All other review of systems are negative     PHYSICAL EXAM:   Vitals:    02/24/20 0730 02/24/20 1005 02/24/20 1533 02/24/20 1745   BP: (!) 149/66 (!) 154/49 (!) 188/60 (!) 190/62   Pulse: 52  54    Resp: 21  20    Temp: 98.1 °F (36.7 °C)  98 °F (36.7 °C)    TempSrc: Temporal  Temporal    SpO2: 95%  95%    Weight:       Height:          Constitutional: Well-developed, no acute distress  Eyes: conjunctivae normal, no xanthelasma   Ears, Nose, Throat: oral mucosa moist, no cyanosis   CV: no JVD. [place]. TIFFANY 2/6 at RUPSB. No rubs, or gallops. PMI is nondisplaced  Lungs: clear to auscultation bilaterally, normal respiratory effort without used of accessory muscles  Abdomen: soft, non-tender, bowel sounds present, no masses or hepatomegaly   Musculoskeletal: no digital clubbing, no edema   Skin: warm, no rashes     EKG 2/23/20: atrial flutter 38 bpm, RBBB    Telemetry 2/24/20:  Atrial flutter 53 bpm, no significant pause or AV block    Echocardiogram 8/3/19:       Findings      Left Ventricle   Mild left ventricular concentric hypertrophy noted. Normal LV segmental wall motion. Ejection fraction is visually estimated at 70%. Stage II diastolic dysfunction. Right Ventricle   Borderline dilated right ventricle. Left Atrium   The left atrium is mildly dilated.       Right Atrium   Mildly enlarged right stenosis   - Moderate AS per TTE 8/5/19.    4. Subdural hematomas   - [place] hole Craniotomy 2/17/20    5. Coronary artery disease  - S/p CABG in 1985. - S/p redo in 1991.  - Follow with the [address] [place] [address]      6. Hypertension  - Uncontrolled   - Lisinopril, imdur, hydralazine      7. BPH   - On Flomax      8. PVD   - S/p BLE bypass      9. CKD   Estimated Creatinine Clearance: 18 mL/min (A) (based on SCr of 2.3 mg/dL (H)). Recommendations:  1. Echocardiogram to follow up of aortic stenosis. 2. 30 day monitor to exclude significant bradyarrhythmias. 3. Consider implantable loop recorder insertion if 30 day cardiac monitor is negative. 4. No definite indication for pacing therapy at this time. 5. EP will sign off. Please call for any questions or concerns. I have spent a total of 90 minutes with the patient and the family reviewing the above stated recommendations. And a total of >50% of that time involved face-to-face time providing counseling and or coordination of care with the other providers. Thank you for allowing me to participate in your patient's care. Please call me if there are any questions or concerns.      Cheryl Bell MD  Cardiac Electrophysiology  [address]  [facility] [place]: [place] [facility]  9:44 PM  2/24/2020

## 2020-02-24 NOTE — PROGRESS NOTES
Sitting: SBA (static)  Min A (dynamic)  Standing: Min/mod A w/ w/w     Activity Tolerance Fair  Good   Visual/  Perceptual Glasses: readers                Hand dominance: R   Strength ROM Additional Info:    RUE  3+/5  WFL   good  and fair+ FMC/dexterity noted during ADL tasks       LUE 3+/5  WFL   good  and fair+ FMC/dexterity noted during ADL tasks       Hearing: [place]  Sensation:  No c/o numbness or tingling  Tone: WFL   Edema: none noted                  Treatment: Upon arrival, patient lying in bed. Pt agreeable to OT session this date in collaboration w/ PT. Therapist facilitated bed mobility (cues for body mechanics and sequencing), functional transfers (education/cues for safety/hand placement), standing tolerance tasks (addressing posture, balance and activity tolerance) and functional ambulation task with w/w (education/cuing on posture, w/w management and safety. Deviation to L/R noted during ambulation in hallway - cues/assist to correct. Mild LOB throughout ambulation task). Therapist facilitated self-care retraining: UB/LB self-care tasks (simulated gown, socks, simulated gown) and seated grooming task (unable to tolerate in standing) while educating pt on modified techniques, posture, safety and energy conservation techniques. Skilled monitoring of HR, O2 sats and pts response to treatment. At end of session, patient lying in bed (bed alarm on) with call light and phone within reach, all lines and tubes intact. Comments: Pt demonstrated decreased independence w/ self-care tasks and functional mobility secondary to standing balance deficits, generalized weakness, decreased safety awareness and limited activity tolerance. Pt would benefit from continued skilled OT to increase functional independence and quality of life.     mod  Profile and History- med (extensive chart review)  Assessment of Occupational Performance and Identification of Deficits- med  Clinical Decision Making- med    Evaluation time includes thorough review of current medical information, gathering information on past medical history/social history and prior level of function, completion of standardized testing/informal observation of tasks, assessment of data, and development of POC/Goals. Assessment of current deficits   Functional mobility [x]  ADLs [x] Strength [x]  Cognition []  Functional transfers  [x] IADLs [] Safety Awareness [x]  Endurance [x]  Fine Motor Coordination [] Balance [x] Vision/perception [] Sensation []   Gross Motor Coordination [] ROM [] Delirium []                  Motor Control []    Plan of Care:   5-7 days  ADL retraining [x]   Equipment needs [x]   Neuromuscular re-education [x] Energy Conservation Techniques [x]  Functional Transfer training [x] Patient and/or Family Education [x]  Functional Mobility training [x]  Environmental Modifications [x]  Cognitive re-training []   Compensatory techniques for ADLs [x]  Splinting Needs []   Positioning to improve overall function [x]   Therapeutic Activity [x]  Therapeutic Exercise  [x]  Visual/Perceptual: []    Delirium prevention/treatment  []   Other:  []    Rehab Potential: Good for established goals    Patient / Family Goal: Not stated     Patient and/or family were instructed on diagnosis, prognosis/goals and plan of care. Demonstrated fair understanding. [] Malnutrition indicators have been identified and nursing has been notified to ensure a dietitian consult is ordered.        Mod Evaluation completed +    Treatment Time In:09:41           Treatment Time Out:09:51               Treatment Charges: Mins Units   Ther Ex  13308     Manual Therapy 86193     Thera Activities 86503 10 1   ADL/Home Mgt [id number]     Neuro Re-ed [id number]     Group Therapy      Orthotic manage/training  [id number]     Non-Billable Time     Total Timed Treatment 10 [address], OTR/L #[id number]

## 2020-02-25 NOTE — PROGRESS NOTES
Constitutional: Negative for fever, activity change and appetite change. HENT: Negative for epistaxis. Eyes: Negative for diploplia, blurred vision. Respiratory: Negative for cough, chest tightness, shortness of breath and wheezing. Cardiovascular: pertinent positives in HPI  Gastrointestinal: Negative for abdominal pain and blood in stool. All other review of systems are negative     PHYSICAL EXAM:   Vitals:    02/24/20 2343 02/25/20 0500 02/25/20 0729 02/25/20 0800   BP: (!) 176/75 (!) 168/80 (!) 180/68 (!) 151/56   Pulse: 53 66 60 53   Resp: 20 16 20 20   Temp: 97 °F (36.1 °C) 98 °F (36.7 °C) 97.3 °F (36.3 °C) 98.1 °F (36.7 °C)   TempSrc: Temporal  Temporal Temporal   SpO2: 98% 99% 96% 97%   Weight:       Height:          Constitutional: Well-developed, no acute distress, slightly confused   Eyes: conjunctivae normal, no xanthelasma   Ears, Nose, Throat: oral mucosa moist, no cyanosis   CV: no JVD. IRIR. Normal S1S2 and no S3. No murmurs, rubs, or gallops. PMI is nondisplaced  Lungs: clear to auscultation bilaterally, normal respiratory effort without used of accessory muscles  Abdomen: soft, non-tender, bowel sounds present, no masses or hepatomegaly   Musculoskeletal: no digital clubbing, no edema   Skin: warm, no rashes     I have personally reviewed the laboratory, cardiac diagnostic and radiographic testing as outlined below:    Data:    Recent Labs     02/23/20  1249   WBC 4.6   HGB 8.3*   HCT 27.6*        Recent Labs     02/23/20  1249      K 4.7      CO2 27   BUN 42*   CREATININE 2.3*   CALCIUM 8.4*      Lab Results   Component Value Date    MG 1.9 02/21/2020     No results for input(s): TSH in the last 72 hours. Recent Labs     02/23/20  1314   INR 1.0       CXR 2/23/20:     Findings:    The lungs are symmetrically expanded, and show minimally increased   prominence of previously documented interstitial density, which could   be related to edema, perhaps even subtle ARDS Mildly enlarged right atrium size. The left atrium is mildly dilated. Moderate mitral annular calcification. Mild mitral regurgitation is present. Moderate aortic stenosis. Mild aortic regurgitation is noted. Mild tricuspid regurgitation. Pulmonary hypertension is mild to moderate . Signature      ----------------------------------------------------------------   Electronically signed by Mark Snyder MD(Interpreting   physician) on 08/05/2019 12:32 PM   ----------------------------------------------------------------      Outpatient Monitor:  30 Day MCOT 9/6/19 Atrial tachycardia with slow ventricular response at 40 to 50's bpm.      I have independently reviewed all of the ECGs and rhythm strips per above     Assessment/Plan: This is a 80 y.o. male with a history of   Patient Active Problem List   Diagnosis    Atypical atrial flutter (HCC)    HTN (hypertension)    CAD (coronary artery disease)    PVD (peripheral vascular disease) ([address]    RBBB (right bundle branch block with left anterior fascicular block)    Cellulitis of right lower extremity    Acute on chronic renal insufficiency    Cellulitis of right foot    Bradycardia    Essential hypertension    Lumbar spondylosis    Benign prostatic hyperplasia without lower urinary tract symptoms    Stage 4 chronic kidney disease ([address]    Vitamin D deficiency    Chronic atrial fibrillation    Orthostatic hypotension    Subdural hematoma (HCC)    Bilateral subdural hematomas (HCC)    AKIRA (acute kidney injury) ([address]    Aortic stenosis    Severe protein-calorie malnutrition (HCC)    Symptomatic bradycardia    Vasovagal syncope    who presents with  maturation syncope and his chronic atrial fibrillation with slow ventricular responce. 1. Maturation syncope   - Syncope episode with urinating     2.  Permanent A.flutter/Fib with slow ventricular response  - Rates in to 35-60   - RBBB/LAFB present since 5/2016  - Thus far only one incidence of dizziness  - No Pauses greater than 5 sec   - no AV node blockers   - [id number] ( vascular disease, age, HTN)   - [address] on hold due to recent Subdural hematoma   - 2 prior DCCV     3. Subdural hematomas   - [place] hole Craniotomy 2/17/20    4. 2. CAD   - S/P CABG 1985, redo 1991  - [facility] [address] [facility]      3. HTN   - Uncontrolled   - Lisinopril, imdur, hydralazine      4. BPH   - Flomax      6. PVD   - S/p BLE bypass      7. CKD   - creat 2.3 yesterday      8. Moderate Aortic stenosis     Recommendations:  1. 30 day monitor to exclude bradyarrhythmias   2. Can consider ILR in future   3. No indication for Pacing therapy at this time. 4. EP to sign off please call if needed         Thank you for allowing me to participate in your patient's care. Please call me if there are any questions or concerns. Discussed with . CINDY Foreman - ANA  Cardiac Electrophysiology  [facility]) Physicians  [facility] [place]: [place] [facility]  10:13 AM  2/25/2020    ______________________________________________________________________    I have personally seen and evaluated the patient. I personally obtained the history and performed the physical exam. I personally reviewed all of the above labs and data. All of the assessments and recommendations are from me. I have reviewed the above documentation completed by the HA. Please see my additional contributions to the HPI, physical exam, and assessment, and recommendations below. History of Present Illness: The patient is a 80year-old gentleman with significant past medical history of CAD s/p CABG in 1985 with Redo in 1991, permanent AF/AFL, hypertension, CKD, PVD s/p BLE bypass in 2005 and chronic subdural hematomas s/p [place] hole evacuation on 2/17/20 who presented to the hospital complaining of syncope.  The patient was recently admitted to the hospital from 2/13/20 to 2/21/20 after effort without used of accessory muscles  Abdomen: soft, non-tender, bowel sounds present, no masses or hepatomegaly   Musculoskeletal: no digital clubbing, no edema   Skin: warm, no rashes     EKG 2/23/20: atrial flutter 38 bpm, RBBB    Telemetry 02/25/20:  Atrial flutter 53 bpm, no significant pause or AV block    Echocardiogram 8/3/19:       Findings      Left Ventricle   Mild left ventricular concentric hypertrophy noted. Normal LV segmental wall motion. Ejection fraction is visually estimated at 70%. Stage II diastolic dysfunction. Right Ventricle   Borderline dilated right ventricle. Left Atrium   The left atrium is mildly dilated. Right Atrium   Mildly enlarged right atrium size. Mitral Valve   Moderate mitral annular calcification. Mild mitral regurgitation is present. Tricuspid Valve   Mild tricuspid regurgitation. Pulmonary hypertension is mild to moderate . Aortic Valve   Moderate aortic stenosis. Mild aortic regurgitation is noted. Pulmonic Valve   Normal pulmonic valve structure and function. Pleural Effusion   No evidence of pleural effusion. Aorta   Normal aortic root and ascending aorta. Conclusions      Summary   Mild left ventricular concentric hypertrophy noted. Normal LV segmental wall motion. Ejection fraction is visually estimated at 70%. Stage II diastolic dysfunction. Borderline dilated right ventricle. Mildly enlarged right atrium size. The left atrium is mildly dilated. Moderate mitral annular calcification. Mild mitral regurgitation is present. Moderate aortic stenosis. Mild aortic regurgitation is noted. Mild tricuspid regurgitation. Pulmonary hypertension is mild to moderate .       Signature      ----------------------------------------------------------------   Electronically signed by Patrick Bella MD(Interpreting   physician) on 08/05/2019 12:32 PM

## 2020-02-25 NOTE — PLAN OF CARE
Problem: Falls - Risk of:  Goal: Will remain free from falls  Description  Will remain free from falls  2/25/2020 0046 by Tommy Hobbs RN  Outcome: Met This Shift  2/24/2020 1851 by Debbi Edmond RN  Outcome: Met This Shift  Goal: Absence of physical injury  Description  Absence of physical injury  2/24/2020 1851 by Debbi Edmond RN  Outcome: Met This Shift

## 2020-02-25 NOTE — DISCHARGE INSTR - COC
Continuity of Care Form    Patient Name: Edvin Elena   :  3/9/1927  MRN:  [id number]    Admit date:  2020  Discharge date:      Code Status Order: Full Code   Advance Directives:   Advance Care Flowsheet Documentation     Date/Time Healthcare Directive Type of Healthcare Directive Copy in [address] Agent's Name Healthcare Agent's Phone Number    20 [phone]  Yes, patient has an advance directive for healthcare treatment  Health care treatment directive; Living will;Durable power of  for health care  No, copy requested from family -- --  --          Admitting Physician:  Gerri Alvarado DO  PCP: Gerri Alvarado DO    Discharging Nurse: Nicol Huitron Unit/Room#: 5787/0754-T  Discharging Unit Phone Number: [phone]    Emergency Contact:   Extended Emergency Contact Information  Primary Emergency Contact: Prakash Jimenez  Address: [address] Phone: [phone]  Mobile Phone: [phone]  Relation: Other  Secondary Emergency Contact: gisele de la cruz  [place] Phone: [phone]  Mobile Phone: [phone]  Relation: Other   needed?  No    Past Surgical History:  Past Surgical History:   Procedure Laterality Date    CORONARY ARTERY BYPASS GRAFT      CRANIOTOMY Right 2020    SUBDURAL HEMATOMA BILATERAL  BRIAN HOLES performed by Meg Hunt MD at [address] [place] [address] VASCULAR SURGERY         Immunization History:   Immunization History   Administered Date(s) Administered    Influenza, High Dose (Fluzone 65 yrs and older) 10/08/2019    Tdap (Boostrix, Adacel) 2016       Active Problems:  Patient Active Problem List   Diagnosis Code    Atypical atrial flutter (HCC) I48.4    HTN (hypertension) I10    CAD (coronary artery disease) I25.10    PVD (peripheral vascular disease) ([facility] [address] I73.9    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    Cellulitis of right lower extremity L03.115    Acute on chronic renal insufficiency N28.9, N18.9    Cellulitis of right foot L03.115    Bradycardia R00.1    Essential hypertension I10    Lumbar spondylosis M47.816    Benign prostatic hyperplasia without lower urinary tract symptoms N40.0    Stage 4 chronic kidney disease (HCC) N18.4    Vitamin D deficiency E55.9    Chronic atrial fibrillation I48.20    Orthostatic hypotension I95.1    Subdural hematoma (HCC) S06.5X9A    Bilateral subdural hematomas (HCC) S06.5X9A    AKIRA (acute kidney injury) ([facility] [address] N17.9    Aortic stenosis I35.0    Severe protein-calorie malnutrition (HCC) E43    Symptomatic bradycardia R00.1    Vasovagal syncope R55       Isolation/Infection:   Isolation          No Isolation        Patient Infection Status     None to display          Nurse Assessment:  Last Vital Signs: BP (!) 168/80   Pulse 66   Temp 98 °F (36.7 °C)   Resp 16   Ht 5' 7\" (1.702 m)   Wt 135 lb (61.2 kg)   SpO2 99%   BMI 21.14 kg/m²     Last documented pain score (0-10 scale): Pain Level: 0  Last Weight:   Wt Readings from Last 1 Encounters:   02/23/20 135 lb (61.2 kg)     Mental Status:  oriented, alert and intermittent confusion     IV Access:  - None    Nursing Mobility/ADLs:  Walking   Assisted  Transfer  Assisted  Bathing  Assisted  Dressing  Assisted  Toileting  Assisted  Feeding  Assisted  Med Admin  Assisted  Med Delivery   whole    Wound Care Documentation and Therapy:  Wound 01/16/20 Shoulder Left (Active)   Wound Skin Tear 2/21/2020 10:00 AM   Dressing/Treatment Open to air 2/21/2020  4:00 PM   Wound Cleansed Soap and water 2/21/2020 10:00 AM   Wound Length (cm) 2.4 cm 2/17/2020 11:08 AM   Wound Width (cm) 1 cm 2/17/2020 11:08 AM   Wound Depth (cm) 0.01 cm 2/17/2020 11:08 AM   Wound Surface Area (cm^2) 2.4 cm^2 2/17/2020 11:08 AM   Change in Wound Size % (l*w) 94.29 2/17/2020 11:08 AM   Wound Volume (cm^3) 0.02 cm^3 2/17/2020 11:08 AM

## 2020-02-25 NOTE — PROGRESS NOTES
02/23/2020    CALCIUM 8.4 02/23/2020    GFRAA 32 02/23/2020    LABGLOM 27 02/23/2020     PT/INR:    Lab Results   Component Value Date    PROTIME 11.7 02/23/2020    PROTIME 16.8 04/16/2012    INR 1.0 02/23/2020     Last 3 Troponin:    Lab Results   Component Value Date    TROPONINI 0.09 02/23/2020    TROPONINI 0.10 02/18/2020    TROPONINI 0.05 02/13/2020     TSH:    Lab Results   Component Value Date    TSH 1.870 02/13/2020      Assessment:     1. Maturation syncope  2. Permanent atrial fib/flutter with slow VR  3. S/P SDH with Jaret hole evacuation  4. HTN  5. PVD  6. CKD3  7. BPH  8.  CAD s/p ACBG    Plan:       Continue same plan and orders    Dc back to SNF

## 2020-02-25 NOTE — CARE COORDINATION
Spoke with Fabio Arce at [facility]. They will submit for precert. She states they have arranged transport for 3p via their [facility] but if for some reason insurance does not come through then they will cancel or move transport. Have notified family, Eliseo Quintero via phone. All discharge paperwork on soft chart.

## 2020-03-10 NOTE — TELEPHONE ENCOUNTER
Spoke to Dr. Page Dukes and we reviewed the Peerio MCOT strips in media. Per Dr. Page Dukes the patient will need to come in for a appointment to discuss possible pacemaker. Will forward so that he can be scheduled.

## 2020-03-10 NOTE — TELEPHONE ENCOUNTER
I spoke to Astrid Katy who is Harinder's niece and POA. She wishes not to proceed with ppm. Patient will refuse treatment. I called and spoke to [facility] and informed them of the patient and family's decision.

## 2020-03-20 PROBLEM — J18.9 PNEUMONIA: Status: ACTIVE | Noted: 2020-01-01

## 2020-03-20 NOTE — ED PROVIDER NOTES
Patient is 19-year-old male who presents emergency department for shortness of breath and chest pain. He has a history of atrial fibrillation, recent intracranial bleed requiring craniotomy, and recent pneumonia. Patient has been refusing his medications per EMS report. Nursing home says he supposed to be on Levaquin for pneumonia, but he will not take it. Patient is very dry on presentation. Heart rate is 55. Occasionally will drop down to the 40s. He is afebrile. Complains of no recent fevers. Locates chest pain to be left of his sternal border. Also states he has shortness of breath. He is oxygenating at 98% on 6 L. He presented on 4 L nasal cannula, but was high 80s low 90s. He has no other complaints at this time. Denies any abdominal pain. States he had some nausea and vomiting earlier, but none at this time. Review of Systems   Constitutional: Negative for diaphoresis and fever. HENT: Negative for congestion, rhinorrhea and sore throat. Eyes: Negative for visual disturbance. Respiratory: Positive for shortness of breath. Negative for cough, chest tightness and wheezing. Cardiovascular: Positive for chest pain. Negative for palpitations and leg swelling. Gastrointestinal: Negative for abdominal pain, blood in stool, constipation, diarrhea, nausea and vomiting. Endocrine: Negative for polyuria. Genitourinary: Negative for decreased urine volume, discharge and dysuria. Musculoskeletal: Negative for back pain, neck pain and neck stiffness. Skin: Negative for rash. Neurological: Negative for syncope, weakness, light-headedness and headaches. Hematological: Negative for adenopathy. Psychiatric/Behavioral: Negative for confusion. Physical Exam  Vitals signs and nursing note reviewed. Constitutional:       General: He is in acute distress. Appearance: Normal appearance. He is well-developed and normal weight.  He is not ill-appearing, toxic-appearing or diaphoretic. HENT:      Head: Normocephalic and atraumatic. Right Ear: External ear normal.      Left Ear: External ear normal.      Nose: Nose normal. No congestion or rhinorrhea. Mouth/Throat:      Mouth: Mucous membranes are dry. Pharynx: Posterior oropharyngeal erythema present. No oropharyngeal exudate. Eyes:      General: No scleral icterus. Right eye: No discharge. Left eye: No discharge. Extraocular Movements: Extraocular movements intact. Pupils: Pupils are equal, round, and reactive to light. Neck:      Musculoskeletal: Normal range of motion and neck supple. No neck rigidity or muscular tenderness. Thyroid: No thyromegaly. Vascular: No carotid bruit. Cardiovascular:      Rate and Rhythm: Bradycardia present. Rhythm irregular. Heart sounds: No murmur. No friction rub. No gallop. Pulmonary:      Effort: Respiratory distress present. Breath sounds: No stridor. Wheezing present. No rhonchi or rales. Comments: Tachypnea. Nasal cannula in place. Decreased breath sounds all lung fields. Chest:      Chest wall: No tenderness. Abdominal:      General: Abdomen is flat. There is no distension. Palpations: Abdomen is soft. There is no mass. Tenderness: There is no abdominal tenderness. There is no right CVA tenderness, left CVA tenderness, guarding or rebound. Hernia: No hernia is present. Musculoskeletal: Normal range of motion. General: No swelling, tenderness, deformity or signs of injury. Right lower leg: No edema. Left lower leg: No edema. Lymphadenopathy:      Cervical: No cervical adenopathy. Skin:     General: Skin is warm and dry. Capillary Refill: Capillary refill takes 2 to 3 seconds. Findings: No erythema or rash. Neurological:      General: No focal deficit present. Mental Status: He is alert. Cranial Nerves: No cranial nerve deficit.       Sensory: No sensory deficit. Psychiatric:         Mood and Affect: Mood normal.         Behavior: Behavior normal.          Procedures     MDM             EKG:  This EKG is signed and interpreted by me. Rate: 52  Rhythm: Sinus  Interpretation: Sinus bradycardia with widened QRS complex. Right axis deviation. No ST elevation or depression. No sign of acute ischemia. Comparison: stable as compared to patient's most recent EKG         --------------------------------------------- PAST HISTORY ---------------------------------------------  Past Medical History:  has a past medical history of Acute MI ([facility] [address] Arrhythmia atrial, Bradycardia, CAD (coronary artery disease), Head injury, Headache, Heart attack ([facility] [address] Hemiparesis ([facility] [address] Hypertension, and Moderate aortic stenosis. Past Surgical History:  has a past surgical history that includes Coronary artery bypass graft; vascular surgery; Leg Surgery; craniotomy (Right, 2/17/2020); and Cardiac surgery (1995). Social History:  reports that he quit smoking about 33 years ago. His smoking use included cigarettes. He started smoking about 75 years ago. He has a 21.00 pack-year smoking history. He has never used smokeless tobacco. He reports that he does not drink alcohol or use drugs. Family History: family history includes Heart Attack in his father. The patients home medications have been reviewed.     Allergies: Lasix [furosemide] and Lipitor    -------------------------------------------------- RESULTS -------------------------------------------------    LABS:  Results for orders placed or performed during the hospital encounter of 03/20/20   CBC Auto Differential   Result Value Ref Range    WBC 6.7 4.5 - 11.5 E9/L    RBC 4.09 3.80 - 5.80 E12/L    Hemoglobin 10.8 (L) 12.5 - 16.5 g/dL    Hematocrit 34.8 (L) 37.0 - 54.0 %    MCV 85.1 80.0 - 99.9 fL    MCH 26.4 26.0 - 35.0 pg    MCHC 31.0 (L) 32.0 - 34.5 %    RDW 13.4 11.5 - 15.0 fL    Platelets 393 773 - 040 E9/L    MPV 9.8 7.0 - 12.0 fL    Neutrophils % 82.0 (H) 43.0 - 80.0 %    Immature Granulocytes % 0.3 0.0 - 5.0 %    Lymphocytes % 9.3 (L) 20.0 - 42.0 %    Monocytes % 6.7 2.0 - 12.0 %    Eosinophils % 1.6 0.0 - 6.0 %    Basophils % 0.1 0.0 - 2.0 %    Neutrophils Absolute 5.46 1.80 - 7.30 E9/L    Immature Granulocytes # 0.02 E9/L    Lymphocytes Absolute 0.62 (L) 1.50 - 4.00 E9/L    Monocytes Absolute 0.45 0.10 - 0.95 E9/L    Eosinophils Absolute 0.11 0.05 - 0.50 E9/L    Basophils Absolute 0.01 0.00 - 0.20 E9/L   Comprehensive Metabolic Panel   Result Value Ref Range    Sodium 141 132 - 146 mmol/L    Potassium 6.4 (H) 3.5 - 5.0 mmol/L    Chloride 105 98 - 107 mmol/L    CO2 17 (L) 22 - 29 mmol/L    Anion Gap 19 (H) 7 - 16 mmol/L    Glucose 78 74 - 99 mg/dL    BUN 51 (H) 8 - 23 mg/dL    CREATININE 3.0 (H) 0.7 - 1.2 mg/dL    GFR Non-African American 20 >=60 mL/min/1.73    GFR African American 24     Calcium 8.8 8.6 - 10.2 mg/dL    Total Protein 7.3 6.4 - 8.3 g/dL    Alb 3.3 (L) 3.5 - 5.2 g/dL    Total Bilirubin 0.4 0.0 - 1.2 mg/dL    Alkaline Phosphatase 110 40 - 129 U/L    ALT 9 0 - 40 U/L    AST 36 0 - 39 U/L   Lipase   Result Value Ref Range    Lipase 32 13 - 60 U/L   Lactate, Sepsis   Result Value Ref Range    Lactic Acid, Sepsis 1.2 0.5 - 1.9 mmol/L   Brain Natriuretic Peptide   Result Value Ref Range    Pro-BNP 5,729 (H) 0 - 450 pg/mL   Troponin   Result Value Ref Range    Troponin 0.10 (H) 0.00 - 0.03 ng/mL   EKG 12 Lead   Result Value Ref Range    Ventricular Rate 52 BPM    Atrial Rate 144 BPM    QRS Duration 150 ms    Q-T Interval 504 ms    QTc Calculation (Bazett) 468 ms    R Axis -57 degrees    T Axis 35 degrees       RADIOLOGY:  XR CHEST PORTABLE   Final Result      1. New large area of rounded consolidation right lower lobe consistent   with pneumonia. 2. Persistent diffuse mild interstitial thickening in both lungs.                        ------------------------- NURSING NOTES AND VITALS REVIEWED unspecified heart failure type (HCC)    7. Elevated brain natriuretic peptide (BNP) level    8. Dehydration        Disposition:  Patient's disposition: Admit to telemetry  Patient's condition is serious.            Mort Range, DO  Resident  03/20/20 5247

## 2020-03-20 NOTE — CONSULTS
The [address] CONSULT-[facility] CARDIOLOGY    Name: Ryan Rutledge    Age: 80 y.o. Date of Admission: 3/20/2020  2:04 PM    Date of Service: 3/20/2020    Reason for Consultation: Chest pain and shortness of breath    Referring Physician: ER    History of Present Illness: The patient is a 80y.o. year old male who was admitted with severe chest pain which he describes as a sharp, stabbing sensation that becomes worse when he takes a deep breath. Also notes dyspnea and unable to give a cogent history because of pain. Per ER note, was diagnosed with pneumonia recently and discharged on antibiotics but did not take them for unclear reasons. Admission ECG showed atrial fibrillation with rate 52 bpm and bifascicular block. Chest x-ray showed new large area of right lower lobe pneumonia. Hypertensive on presentation. Past Medical History:   Hypertension, hyperlipidemia, permanent atrial fibrillation for which he was on warfarin, stage IV chronic kidney disease, CABG with redo. Echo 2 weeks ago showed preserved left ventricular systolic function with mild aortic stenosis. Chronic bradycardia with recent electrophysiology evaluation and no indication for pacemaker noted. Recent mechanical fall with subdural hematoma requiring bilateral bur hole treatment. Review of Systems: Unobtainable due to to patient distress.       Family History:  Family History   Problem Relation Age of Onset    Heart Attack Father        Social History:  Social History     Socioeconomic History    Marital status: Single     Spouse name: Not on file    Number of children: Not on file    Years of education: Not on file    Highest education level: Not on file   Occupational History    Not on file   Social Needs    Financial resource strain: Not on file    Food insecurity     Worry: Not on file     Inability: Not on file    Transportation needs     Medical: Not on file     Non-medical: Not on file   Tobacco Use    Smoking status: Former Smoker     Packs/day: 0.50     Years: 42.00     Pack years: 21.00     Types: Cigarettes     Start date:      Last attempt to quit:      Years since quittin.2    Smokeless tobacco: Never Used   Substance and Sexual Activity    Alcohol use: No     Comment: occasionally    Drug use: Never    Sexual activity: Not on file   Lifestyle    Physical activity     Days per week: Not on file     Minutes per session: Not on file    Stress: Not on file   Relationships    Social connections     Talks on phone: Not on file     Gets together: Not on file     Attends Sabianist service: Not on file     Active member of club or organization: Not on file     Attends meetings of clubs or organizations: Not on file     Relationship status: Not on file    Intimate partner violence     Fear of current or ex partner: Not on file     Emotionally abused: Not on file     Physically abused: Not on file     Forced sexual activity: Not on file   Other Topics Concern    Not on file   Social History Narrative    Not on file       Allergies:   Allergies   Allergen Reactions    Lasix [Furosemide]     Lipitor      \"makes me weak\"       Current Medications:  Current Facility-Administered Medications   Medication Dose Route Frequency Provider Last Rate Last Dose    0.9 % sodium chloride bolus  500 mL Intravenous Once Rubye Jaya, DO        ipratropium-albuterol (DUONEB) nebulizer solution 3 ampule  3 ampule Inhalation Once Rubye Jaya, DO         Current Outpatient Medications   Medication Sig Dispense Refill    hydrALAZINE (APRESOLINE) 25 MG tablet Take 1 tablet by mouth every 8 hours 90 tablet 3    levETIRAcetam (KEPPRA) 750 MG tablet Take 1 tablet by mouth 2 times daily 60 tablet 3    lisinopril (PRINIVIL;ZESTRIL) 10 MG tablet Take 1 tablet by mouth daily 30 tablet 3    tamsulosin (FLOMAX) 0.4 MG capsule Take 1 capsule by mouth daily 90 capsule 1    patiromer sorbitex calcium (VELTASSA) 8.4 g PACK packet Take 8.4 g by mouth daily      gabapentin (NEURONTIN) 100 MG capsule Take 200 mg by mouth 3 times daily.  isosorbide mononitrate (IMDUR) 60 MG extended release tablet Take 1 tablet by mouth 2 times daily 60 tablet 2    sodium bicarbonate 650 MG tablet Take 650 mg by mouth 2 times daily      vitamin D (CHOLECALCIFEROL) 1000 UNIT TABS tablet Take 1,000 Units by mouth 2 times daily            Physical Exam:  BP (!) 155/56   Pulse 55   Temp 97.9 °F (36.6 °C)   Resp 18   SpO2 98%   Weight change: Wt Readings from Last 3 Encounters:   02/23/20 135 lb (61.2 kg)   02/19/20 132 lb 3.2 oz (60 kg)   02/10/20 129 lb (58.5 kg)         General: Awake, alert, oriented x3, in at least moderate distress due to chest discomfort which becomes worse with breathing. Unable to take a deep breath due to chest discomfort. HEENT: Unremarkable  Neck: No JVD or bruits. Cardiac: Regular rate and rhythm, 2/6 systolic murmur right upper sternal border. No other extra heart sounds, murmurs, heaves, thrills  Resp: Lungs have coarse breath sounds without wheezing or crackles. No accessory muscle use or retraction  Abdomen: soft, nontender, nondistended, no gross organomegaly or mass  Skin: Warm and dry, no cyanosis. Musculoskeletal: normal tone and strength in the upper and lower extremities bilaterally  Neuro: Grossly unremarkable  Psych: Cooperative, and normal affect    Intake/Output:  No intake or output data in the 24 hours ending 03/20/20 1449  No intake/output data recorded. Laboratory Tests:  Lab Results   Component Value Date    CREATININE 2.3 (H) 02/23/2020    BUN 42 (H) 02/23/2020     02/23/2020    K 5.5 (H) 03/17/2020     02/23/2020    CO2 27 02/23/2020     No results for input(s): CKTOTAL, CKMB in the last 72 hours.     Invalid input(s): TROPONONI  Lab Results   Component Value Date     (H) 03/19/2012     Lab Results   Component Value Date    WBC 4.6 02/23/2020    RBC 3.28 02/23/2020    HGB 8.3 02/23/2020    HCT 27.6 02/23/2020    MCV 84.1 02/23/2020    MCH 25.3 02/23/2020    MCHC 30.1 02/23/2020    RDW 14.0 02/23/2020     02/23/2020    MPV 9.3 02/23/2020     No results for input(s): ALKPHOS, ALT, AST, PROT, BILITOT, BILIDIR, LABALBU in the last 72 hours. Lab Results   Component Value Date    MG 1.9 02/21/2020     Lab Results   Component Value Date    PROTIME 11.7 02/23/2020    PROTIME 16.8 04/16/2012    INR 1.0 02/23/2020     Lab Results   Component Value Date    TSH 1.870 02/13/2020     No components found for: CHLPL  Lab Results   Component Value Date    TRIG 139 02/10/2020    TRIG 91 08/17/2019    TRIG 95 02/13/2019     Lab Results   Component Value Date    HDL 52 02/10/2020    HDL 67 08/17/2019    HDL 63 02/13/2019     Lab Results   Component Value Date    LDLCALC 123 (H) 02/10/2020    [address] 95 08/17/2019    LDLCALC 79 02/13/2019     Lab Results   Component Value Date    INR 1.0 02/23/2020       Admission ECG reviewed by me revealed atrial fibrillation with controlled ventricular rate, bifascicular block. ASSESSMENT / PLAN:    #1.  Chest pain-unlikely due to obstructive CAD in that it becomes worse with taking breaths and he has pneumonia on chest x-ray. More likely due to pneumonia. Suggest antibiotics per hospitalist team and with chronic kidney disease he is not a candidate for invasive evaluation or stress testing at this time. Continue isosorbide for medical management. #2.  Paroxysmal atrial fibrillation/flutter-currently in atrial fibrillation with slow response but recently felt by electrophysiology to not require pacemaker. Avoid beta-blockers, rate slowing calcium channel blockers, or clonidine. #3. Anticoagulation status-was on warfarin for anticoagulation but is no longer on it because of recent fall and subdural hematoma. #4.   Hypertension-blood pressure elevated on presentation but he is in distress at this time but no new antihypertensives added. #5. Hyperlipidemia-continue statin. #6.  Aortic stenosis-no need to repeat echocardiogram as he had one around a month ago.               Electronically signed by Maria Teresa Ramirez MD on 3/20/2020 at 2:49 PM

## 2020-03-21 NOTE — CONSULTS
[address]                  [address] [facility]  [address]                                  CONSULTATION    PATIENT NAME: Shahid Moody                    :        1927  MED REC NO:   [id number]                            ROOM:       6424  ACCOUNT NO:   [de-identified]                           ADMIT DATE: 2020  PROVIDER:     Feliciano Branch MD    CONSULT DATE:  2020    REASON FOR CONSULTATION:  Acute kidney injury on top of chronic kidney  disease. HISTORY OF PRESENT ILLNESS:  The patient is a pleasant 80year-old male  known to me. He is presenting to the hospital with chief complaint of  shortness of breath. He has history of chronic kidney disease stage 4, baseline creatinine  around 2 to 2.3; atrial fibrillation; coronary artery disease; aortic  stenosis. He had recent intracranial bleed, status post craniotomy. He presented to the hospital, chief complaint of shortness of breath. He was found to have pneumonia. On his chest x-ray, he was found to  have right lower lobe infiltrate and subsequently admitted to the  hospital.    Blood work on presentation with creatinine of 2.3. His baseline  creatinine as mentioned before is around 2 to 2.3. His potassium was as  high as 6.4. Sample was hemolyzed. Repeat sample potassium 5.4. To my  knowledge, he usually takes Veltassa 8.4 gm daily to keep his potassium  under control. He is also on lisinopril 10 mg daily. I saw him today. He is in no apparent distress. He looked euvolemic. His urinalysis revealed him to have urine specific gravity of 1.025,  supporting component of prerenal/volume depletion. He has no hematuria. He has around 1 gm proteinuria based on urine dipstick and this is  consistent with prior protein-creatinine ratio of 1.4. REVIEW OF SYSTEMS:  A 12-point done, negative except for those mentioned  above. ALLERGIES:  Include LASIX, LIPITOR.     PAST MEDICAL HISTORY:  Includes hypertension, hyperlipidemia, coronary  artery disease, aortic stenosis. PAST SURGICAL HISTORY:  Includes craniotomy, heart surgery, leg surgery. SOCIAL HISTORY:  No smoking or drinking. FAMILY HISTORY:  Reviewed and noncontributory. MEDICATIONS:  At home include:  1. Hydralazine. 2.  Meclizine. 3.  Lisinopril. 4.  Neurontin. 5.  Imdur. 6.  Vitamin D.    PHYSICAL EXAMINATION:  VITAL SIGNS:  Blood pressure 122/67. HEAD:  Normocephalic. NECK:  Supple. EYES:  Pupils round and reactive to light bilaterally. HEART:  Normal rate. ABDOMEN:  Soft, nontender. Bowel sounds active. No organomegaly. LUNGS:  Good airflow bilaterally. SKIN:  Dry. EXTREMITIES:  No edema. BLOOD WORK:  Sodium 142, potassium 5.4, chloride 107, CO2 21, creatinine  2.9, BUN 49. Albumin 3. WBC 6.9, hemoglobin 9.2, platelets 928. ECHO OF THE HEART:  EF 60% to 65%. Severe mitral annular calcification,  mild aortic stenosis. ASSESSMENT:  List of problem:  1. Acute kidney injury. 2.  Chronic kidney disease stage 4. Baseline creatinine 2 to 2.3.  3.  Right lower lobe pneumonia. 4.  Atrial fibrillation. 5.  History of intracranial bleed. PLAN:  1. The patient's AKIRA is felt to be due to volume depletion along with  hemodynamic component due to being on lisinopril. 2.  He is clinically euvolemic. Blood pressure is stable. 3.  He is on antibiotics for right lower lobe pneumonia. RECOMMENDATIONS:  1.  Start gentle IV fluid in the form of normal saline at 50 mL an hour. 2.  Start him back on Veltassa 8.4 gm daily. 3.  Vancomycin dose to be guided by pharmacy. 4.  Blood work in the morning.     Thank you very much for the referral.        Jack Curry MD    D: 03/21/2020 16:57:42       T: 03/21/2020 17:08:39     [email or web address]  Job#: [id number]     Doc#: [id number]    CC:

## 2020-03-21 NOTE — PROGRESS NOTES
[facility] [address]    Name: Essence Bird    Age: 80 y.o. PCP: Tevin Ware DO    Date of Admission: 3/20/2020  2:04 PM    Date of Service: 3/21/2020    Chief Complaint: Follow-up for atypical CP    The patient is a 80y.o. year old male who was admitted with severe chest pain which he describes as a sharp, stabbing sensation that becomes worse when he takes a deep breath. Also notes dyspnea and unable to give a cogent history because of pain. Per ER note, was diagnosed with pneumonia recently and discharged on antibiotics but did not take them for unclear reasons. Admission ECG showed atrial fibrillation with rate 52 bpm and bifascicular block. Chest x-ray showed new large area of right lower lobe pneumonia. Hypertensive on presentation.     Past Medical History:   Hypertension, hyperlipidemia, permanent atrial fibrillation for which he was on warfarin, stage IV chronic kidney disease, CABG with redo.    Echo 2 weeks ago showed preserved left ventricular systolic function with mild aortic stenosis. Chronic bradycardia with recent electrophysiology evaluation and no indication for pacemaker noted. Recent mechanical fall with subdural hematoma requiring bilateral bur hole treatment.     Interim History:  No new overnight cardiac complaints. Currently with no complaints of angina, SOB, palpitations. Frustrated at nursing home, and that he has trouble feeding himself. Telemetry personally reviewed and showed atrial fibr rate 40-60      Review of Systems:   Cardiac: As per HPI  General: No fever, chills  Pulmonary: No cough, wheeze, or shortness of breath  GI: No nausea, vomiting,or abdominal pain  Neuro: No headache or confusion    Problem List:  Active Problems:    Pneumonia  Resolved Problems:    * No resolved hospital problems.  *      Past Medical History:  Past Medical History:   Diagnosis Date    Acute [facility] [address]     Arrhythmia atrial     Bradycardia (61.2 kg)   02/19/20 132 lb 3.2 oz (60 kg)     General: frail elderly man in bed no acute distress  Neck: No JVD, carotid bruits, thyromegaly, or lymphadenopathy  Cardiac: irreg irreg  normal S1 and split S2, no murmurs, no S3 or S4, no pericardial rubs. Carotid upstrokes brisk  Resp: clear bilaterally without wheezes, rhonchi, or rales; unlabored respirations  Abdomen: soft, nontender, nondistended, BS+; no masses, bruits, or hepatomegaly  Extremities: no cyanosis, clubbing, or edema. Distal pulses intact  Skin: Warm and dry, no rashes or lesions  Neuro: moves all extremities to command, no focal deficits noted    Intake/Output:    Intake/Output Summary (Last 24 hours) at 3/21/2020 1307  Last data filed at 3/21/2020 1040  Gross per 24 hour   Intake 710 ml   Output 575 ml   Net 135 ml     I/O this shift:  In: 340 [P.O.:340]  Out: 100 [Urine:100]    Laboratory Tests:  Last 3 CBC:  Recent Labs     03/20/20  1450 03/21/20  0552   WBC 6.7 6.9   RBC 4.09 3.60*   HGB 10.8* 9.2*   HCT 34.8* 30.1*   MCV 85.1 83.6   MCH 26.4 25.6*   MCHC 31.0* 30.6*   RDW 13.4 13.3    163   MPV 9.8 9.7       Last 3 CMP:    Recent Labs     03/20/20  1450 03/21/20  0552    142   K 6.4* 5.4*    107   CO2 17* 21*   BUN 51* 49*   CREATININE 3.0* 2.9*   GLUCOSE 78 75   CALCIUM 8.8 8.4*   PROT 7.3 6.4   LABALBU 3.3* 3.0*   BILITOT 0.4 0.3   ALKPHOS 110 103   AST 36 16   ALT 9 5       Last 3 Mag/Phos:  No results for input(s): MG, PHOS in the last 72 hours. Last 3 CK, CKMB, Troponin  Recent Labs     03/20/20  1450   TROPONINI 0.10*       Last 3 BNP:  No results for input(s): BNP in the last 72 hours. Lab Results   Component Value Date     (H) 03/19/2012       Last 3 Glucose:     Recent Labs     03/20/20  1450 03/21/20  0552   GLUCOSE 78 75       Last 3 Coags:  No results for input(s): PROTIME, INR, PTT in the last 72 hours.   Lab Results   Component Value Date    PROTIME 11.7 02/23/2020    PROTIME 16.8 04/16/2012

## 2020-03-21 NOTE — H&P
to above chief complaint. ALLERGIES:  LASIX and LIPITOR. REVIEW OF SYSTEMS:  Remainder of systems is otherwise negative. PHYSICAL ASSESSMENT:  GENERAL:  Reveals an elderly male in no acute distress. VITAL SIGNS:  BP of 155/56, temperature of 97.9, pulse rate of 55,  respiratory rate of 18. ENT:  Exam is negative. HEART:  Regular. LUNGS:  Demonstrate diminished breath sounds in a right lung base. ABDOMEN:  Noted to be soft with positive bowel sounds. EXTREMITIES:  Reveal no evidence for edema and/or cyanosis. ASSESSMENT:  1. Healthcare-acquired pneumonia. 2.  Chest pain, possibly secondary to #1.  3.  Status post subdural hematoma with asif hole evacuation. 4.  Permanent atrial fibrillation/flutter with history of slow  ventricular response. 5.  Essential hypertension. 6.  Peripheral vascular disease. 7.  Chronic kidney disease stage III to IV. 8.  History of BPH. 9.  History of coronary artery disease status post CABG. TREATMENT PLAN:  See orders. Appropriate consultations. DISCHARGE PLAN:  Back to the nursing home when medically stable.         Merlin Sickles, DO    D: 03/21/2020 8:57:05       T: 03/21/2020 10:28:11     [email or web address]  Job#: [id number]     Doc#: [id number]    CC:

## 2020-03-21 NOTE — CONSULTS
Emmanuel Pulido M.D.,[facility]  Boaz Gómez D.O., F.A.C.O.I., Chelsey Rome M.D. Stew Pressley M.D., Caroline Cohn M.D. Alex Spence D.O. Patient:  Krysten Sandoval 80 y.o. male MRN: [id number]     Date of Service: 3/21/2020      PULMONARY CONSULTATION    Reason for Consultation: pneumonia  Referring Physician: Chasidy Soria DO      Chief Complaint   Patient presents with    Shortness of Breath     x 1 week patient from [facility] has not been taking medication. Had Craniotomy last month with fall SDH. Last emesis 1 hour ago. On abx for pnemonia     Chest Pain    Emesis         Code Status: DNR-CCA        SUBJECTIVE:    HPI:  This is a 70-year-old male with history of A. fib, MI, hypertension, aortic stenosis, recent intracranial bleed requiring craniotomy that presents from nursing facility with pneumonia. Patient is a poor historian. Patient was diagnosed with pneumonia at the nursing home but he had not been taking his Levaquin. Patient does report that he has been coughing more than normal and having shortness of breath. Patient has been hypoxic on presentation to emergency department in the high 80s on 4 L and increased to 6 L. Chest x-ray showed right lower lobe infiltrate and placed on antibiotics.     Past Medical History:   Diagnosis Date    Acute MI ([address]     Arrhythmia atrial     Bradycardia     CAD (coronary artery disease)     Head injury 08/2019    Headache     Heart attack ([address] 1987    Hemiparesis ([address]     Hypertension     Moderate aortic stenosis 08/05/2019     Past Surgical History:   [facility]      CRANIOTOMY Right 2/17/2020    SUBDURAL HEMATOMA BILATERAL  BRIAN HOLES performed by Yoni Linares MD at [address] [place] [address] VASCULAR SURGERY       Family History   Problem Relation Age of Onset    Heart Attack Father          Social History:   Social cultures        Thank you for allowing me to participate in the care of Rodrigo Frankel. Please feel free to call with questions.        Electronically signed by Carol Sánchez DO on 3/21/2020 at 1:52 PM

## 2020-03-21 NOTE — PLAN OF CARE
Problem: Pain:  Goal: Pain level will decrease  Description: Pain level will decrease  3/21/2020 0737 by Merritt Cardoso RN  Outcome: Ongoing  3/21/2020 0314 by Lily Gamble RN  Outcome: Met This Shift  Goal: Control of acute pain  Description: Control of acute pain  3/21/2020 0737 by Merritt Cardoso RN  Outcome: Ongoing  3/21/2020 0314 by Lily Gamble RN  Outcome: Met This Shift  Goal: Control of chronic pain  Description: Control of chronic pain  3/21/2020 0737 by Merritt Cardoso RN  Outcome: Ongoing  3/21/2020 0314 by Lily Gamble RN  Outcome: Met This Shift     Problem: Falls - Risk of:  Goal: Will remain free from falls  Description: Will remain free from falls  3/21/2020 0737 by Merritt Cardoso RN  Outcome: Ongoing  3/21/2020 0314 by Lily Gamble RN  Outcome: Met This Shift  Goal: Absence of physical injury  Description: Absence of physical injury  3/21/2020 0737 by Merritt Cardoso RN  Outcome: Ongoing  3/21/2020 0314 by Lily Gamble RN  Outcome: Met This Shift

## 2020-03-22 NOTE — PLAN OF CARE
Problem: Pain:  Goal: Pain level will decrease  Description: Pain level will decrease  3/22/2020 0804 by An Chavarria RN  Outcome: Ongoing  3/21/2020 2151 by Kaiser Fuller RN  Outcome: Met This Shift  Goal: Control of acute pain  Description: Control of acute pain  3/22/2020 0804 by An Chavarria RN  Outcome: Ongoing  3/21/2020 2151 by Kaiser Fuller RN  Outcome: Met This Shift  Goal: Control of chronic pain  Description: Control of chronic pain  3/22/2020 0804 by An Chavarria RN  Outcome: Ongoing  3/21/2020 2151 by Kaiser Fuller RN  Outcome: Met This Shift     Problem: Falls - Risk of:  Goal: Will remain free from falls  Description: Will remain free from falls  3/22/2020 0804 by An Chavarria RN  Outcome: Ongoing  3/21/2020 2151 by Kaiser Fuller RN  Outcome: Met This Shift  Goal: Absence of physical injury  Description: Absence of physical injury  3/22/2020 0804 by An Chavarria RN  Outcome: Ongoing  3/21/2020 2151 by Kaiser Fuller RN  Outcome: Met This Shift

## 2020-03-23 NOTE — PROGRESS NOTES
Pharmacy Consultation Note  (Antibiotic Dosing and Monitoring)    Initial consult date: 3/21/20  Consulting physician: Dr. Sharon Jason  Drug(s): Vancomycin   Indication: Pneumonia    Ht Readings from Last 1 Encounters:   20 5' 7\" (1.702 m)       Age/Gender IBW DW  Allergy Information   80 y.o.  male 66.1 kg 56.4 kg  Lasix [furosemide] and Lipitor               Date  WBC BUN/sCr UOP (mL/kg/hr) Drug/Dose Time   Given Level(s)   (Time) Comments   3/20  (#1) 6.7 51/3 --- Vancomycin 1250 mg IV x 1 2004     3/21  (#2) 6.9 49/2.9 --- --- ---     3/22  (#3) --- --- --- Vancomycin 1000 mg IV q 24 hr 1440 Random = 9.5 mcg/mL  @ 0959       3/23  (#4) --- 52/2.6 --- Vancomycin 1000 mg IV q 24 hr <1230>       (#5)            (#6)            Other Antibiotics/Antifungals/Antivirals Start Date Stop Date Comments   Ceftriaxone 2 g IV x 1 3/20 3/20    Cefepime 1 g IV q 24 hr 3/21                   Estimated CrCL: 13 mL/min    Intake/Output Summary (Last 24 hours) at 3/23/2020 1122  Last data filed at 3/23/2020 0851  Gross per 24 hour   Intake 100 ml   Output 400 ml   Net -300 ml   UOP: incomplete documentation per last 24 hours    Temp max: Temp (24hrs), Av.3 °F (36.3 °C), Min:97 °F (36.1 °C), Max:97.6 °F (36.4 °C)      Cultures:    Culture Date Result    Blood #1 3/20 24 hours no growth   Blood # 2 3/20 24 hours no growth   Urine 3/21 < 10,000 gram positive organisms            Assessment:  · Consulted by Dr. Sharon Jason to dose/monitor vancomycin  · Goal trough level:  15-20 mcg/mL  · Patient is a 79 yo/M with a PMH of atrial fibrillation, recent intracranial bleed s/p craniotomy, and pneumonia (refused to take Levaquin at nursing home) who presented to [facility] on 3/20 complaining of SOB and chest pain. Admitted for pneumonia and AKIRA.   · sCr = 2.9 mg/dL; baseline =  2.3 mg/dL  · Random level on 3/22 = 9.5 mcg/mL    Plan:  · Vancomycin 1000 mg IV q 24 hr.   · Will check vancomycin trough level when steady state is attained. · Monitor renal function. · Pharmacist will follow and monitor/adjust dosing as necessary. Thank you for this consult.     Braden Guidry PharmSIENNA 3/23/2020 11:22 AM

## 2020-03-23 NOTE — PROGRESS NOTES
[facility] [address]    Name: Donna Ricks    Age: 80 y.o. PCP: Bonnie Ley DO    Date of Admission: 3/20/2020  2:04 PM    Date of Service: 3/23/2020    Chief Complaint: Follow-up for atypical CP    The patient is a 80y.o. year old male who was admitted with severe chest pain which he describes as a sharp, stabbing sensation that becomes worse when he takes a deep breath. Also notes dyspnea and unable to give a cogent history because of pain. Per ER note, was diagnosed with pneumonia recently and discharged on antibiotics but did not take them for unclear reasons. Admission ECG showed atrial fibrillation with rate 52 bpm and bifascicular block. Chest x-ray showed new large area of right lower lobe pneumonia. Hypertensive on presentation.     Past Medical History:   Hypertension, hyperlipidemia, permanent atrial fibrillation for which he was on warfarin, stage IV chronic kidney disease, CABG with redo.    Echo 2 weeks ago showed preserved left ventricular systolic function with mild aortic stenosis. Chronic bradycardia with recent electrophysiology evaluation and no indication for pacemaker noted. Recent mechanical fall with subdural hematoma requiring bilateral bur hole treatment.     Interim History:  No new overnight cardiac complaints. Has been refusing some therapies, some confusion. Telemetry personally reviewed and showed atrial fibr rate 40-60- no pauses      Review of Systems:   Cardiac: As per HPI  General: No fever, chills  Pulmonary: No cough, wheeze, or shortness of breath  GI: No nausea, vomiting,or abdominal pain  Neuro: No headache or confusion    Problem List:  Active Problems:    Pneumonia  Resolved Problems:    * No resolved hospital problems.  *      Past Medical History:  Past Medical History:   Diagnosis Date    Acute MI ([address]     Arrhythmia atrial     Bradycardia     CAD (coronary artery disease)     Head injury 08/2019    Headache     Heart attack ([facility] [address] 1987    Hemiparesis ([facility] [address]     Hypertension     Moderate aortic stenosis 08/05/2019       Allergies:   Allergies   Allergen Reactions    Lasix [Furosemide]     Lipitor      \"makes me weak\"       Current Medications:  Current Facility-Administered Medications   Medication Dose Route Frequency Provider Last Rate Last Dose    vancomycin 1000 mg IVPB in 250 mL D5W addavial  1,000 mg Intravenous Q24H 220 Rosamaria Coy, [facility]   Stopped at 03/22/20 1559    patiromer sorbitex calcium (VELTASSA) packet 8.4 g  8.4 g Oral Daily Kenny Bansal MD   8.4 g at 03/21/20 1520    guaiFENesin tablet 400 mg  400 mg Oral TID Gurpreet Bullock, DO   400 mg at 03/22/20 1441    cefepime (MAXIPIME) 1 g IVPB extended (mini-bag)  1 g Intravenous Q24H León Hussein,    Stopped at 03/22/20 1954    sodium chloride flush 0.9 % injection 10 mL  10 mL Intravenous 2 times per day Chiu Sharps, DO   10 mL at 03/22/20 2318    sodium chloride flush 0.9 % injection 10 mL  10 mL Intravenous PRN Chiu Sharps, DO        acetaminophen (TYLENOL) tablet 650 mg  650 mg Oral Q4H PRN Chiu Sharps, DO        albuterol (PROVENTIL) nebulizer solution 2.5 mg  2.5 mg Nebulization Q6H PRN Katy Buccino, DO   2.5 mg at 03/21/20 2037    tamsulosin (FLOMAX) capsule 0.4 mg  0.4 mg Oral Daily Katy Buccino, DO   0.4 mg at 03/22/20 0758    hydrALAZINE (APRESOLINE) tablet 25 mg  25 mg Oral 3 times per day Katy Buccino, DO   25 mg at 03/22/20 1441    levETIRAcetam (KEPPRA) tablet 750 mg  750 mg Oral BID Katy Buccino, DO   750 mg at 03/22/20 0757    lisinopril (PRINIVIL;ZESTRIL) tablet 10 mg  10 mg Oral Daily Katy Buccino, DO   10 mg at 03/22/20 0802    gabapentin (NEURONTIN) capsule 200 mg  200 mg Oral TID Katy Buccino, DO   200 mg at 03/22/20 1441    isosorbide mononitrate (IMDUR) extended release tablet 60 mg  60 mg Oral Daily Katy Buccino, DO   60 mg at 03/22/20 0757    sodium bicarbonate tablet 650 mg  650 mg Oral BID CKMB, Troponin  Recent Labs     03/20/20  1450   TROPONINI 0.10*       Last 3 BNP:  No results for input(s): BNP in the last 72 hours. Lab Results   Component Value Date     (H) 03/19/2012       Last 3 Glucose:     Recent Labs     03/20/20  1450 03/21/20  0552 03/23/20  0526   GLUCOSE 78 75 89       Last 3 Coags:  No results for input(s): PROTIME, INR, PTT in the last 72 hours. Lab Results   Component Value Date    PROTIME 11.7 02/23/2020    PROTIME 16.8 04/16/2012    INR 1.0 02/23/2020       Last 3 Lipid Panel:  No results for input(s): LDLCALC, HDL, TRIG in the last 72 hours. Invalid input(s): CHLPL  Lab Results   Component Value Date    LDLCALC 123 (H) 02/10/2020    [address] 95 08/17/2019    LDLCALC 79 02/13/2019     Lab Results   Component Value Date    HDL 52 02/10/2020    HDL 67 08/17/2019    HDL 63 02/13/2019     Lab Results   Component Value Date    TRIG 139 02/10/2020    TRIG 91 08/17/2019    TRIG 95 02/13/2019     No components found for: CHLPL    TSH:  No results for input(s): TSH in the last 72 hours. Lab Results   Component Value Date    TSH 1.870 02/13/2020           Radiology:  XR CHEST PORTABLE   Final Result   Bilateral opacities are unchanged. Differential includes   atelectasis/scarring, pulmonary edema, infection, and/or layering   pleural effusions. XR CHEST PORTABLE   Final Result      1. New large area of rounded consolidation right lower lobe consistent   with pneumonia. 2. Persistent diffuse mild interstitial thickening in both lungs. ASSESSMENT / PLAN:  1  CP -not very c/w angina felt due to pneumonia  2 RLL Pneumonia- HCAP -on abiotics  3 AFib chronic SVR, but no long pauses- avoid AV nodalblockers, has been followed by EP  4 OAC off due to recent subdural hematomas  5 CAD stable  6 AS mild by last echo  7 CKD stage 4  No other cardiac plans , will see prn.  Call if new cocerns Ulysses Ros, MD, [facility]  [facility]

## 2020-03-24 NOTE — CONSULTS
Palliative Care Department  Palliative Care Initial Consult  Provider: Taras Turcios Day: 4    Referring Provider:  Dr. Stephen Coronel    Reason for Consult:  [x]  Code status Discussion  [x]  Assist with goals of care  []  Psychosocial support  []  Symptom Management  []  Advanced Care Planning    Chief Complaint: Karol Pham is a 80 y.o. male with chief complaint of shortness of breath and chest pain. Active Hospital Problems    Diagnosis Date Noted    Pneumonia [J18.9] 03/20/2020           Palliative Care Encounter/Recommendations:      - Goals of care: provide comfort care/support/palliation/relieve suffering, preparation for death, achievement of a peaceful death and support for family/caregiver     - Code Status: DNR-CC          Subjective:     HPI:  Karol Pham is a 80 y.o. male with significant past medical history of hypertension, hyperlipidemia, atrial fibrillation, CABG, CKD, recent intracranial bleed s/p craniotomy and recent pneumonia who presented with shortness of breath and chest pain from a nursing home. Patient is supposed to be taking Levaquin for pneumonia however will not take it at nursing home. Chest x-ray in ED showed a large area of rounded consolidation in the right lower lobe consistent with pneumonia and persistent diffuse mild interstitial thickening in both lungs. Lab work in ED significant for: Potassium 6.4, BUN 51, creatinine 3.0, pro BNP elevated at 5,729, and troponin 0.10. Admitted to telemetry. Cardiology, nephrology, and pulmonology following. Currently on antibiotics for right lower lobe pneumonia. Palliative medicine consulted for goals of care and CODE STATUS discussion. Patient currently DNR CCA and swati, Channing Nath, is power of . Per chart review, plan is for patient to return to [facility] at discharge. Subjective/Events/Discussions:    Patient seen, in bed and no family at bedside.  Patient alert however disoriented and distended, BS+  Ext:  Moving all extremities, no edema, pulses present  Skin:  Warm and dry  Neuro:  PERRL, Alert, oriented to person only; following commands        Current Medications:  Inpatient medications reviewed: yes  Home Medications reviewed: yes    Results/Verification of Data Review  Objective data reviewed: labs, images, records, medication use, vitals and chart      - Advanced Directives: Living Will, HC-POA and Documents available in the Epic Media   -  - Spiritual assessment: No spiritual distress identified     - Bereavement and grief: to be determined    - Discharge planning: to be determined    - Prognosis: Poor    - Referrals to: Hospice    Time/Communication  Greater than 51% of time spent, total 50 minutes in counseling and coordination of care at the bedside regarding goals of care, diagnosis and prognosis and see above. Assessment/Plan     1. Goals of care: consult hospice, comfort meds  2. Code status: DNR-CC  3. Support: support given to patient and Fili WHITE-CNP  Palliative Medicine    Discussed patient and the plan of care with the other IDT members of Palliative Care, and with patient, family and floor nurse. Thank you for allowing Palliative Medicine to participate in the care of Mann Garsia. Note: This report was completed using computerfanatix voiced recognition software. Every effort has been made to ensure accuracy; however, inadvertent computerized transcription errors may be present.

## 2020-03-24 NOTE — PROGRESS NOTES
Juan Perez M.D.,[facility]  Lazarus Davenport D.O., GRISELDA., Denver Sterling M.D. Kassandra Golden M.D., Antolin Dozier M.D. Jerica Deal D.O. Daily Pulmonary Progress Note    Patient:  Carmen Liu 80 y.o. male MRN: [id number]     Date of Service: 3/24/2020      Synopsis     We are following patient for pneumonia    \"CC\" cough    Code status:dnr-cc      Subjective      Patient was seen and examined. Patient is sitting up in chair today he appears in no acute distress, is on 6 L high flow saturation is 95%. He is conversive with me today he knows the date and that he is in the hospital.  He tells me that he felt out of it yesterday. I did remind him that he was screaming at nursing and myself to leave him alone. He seems very cooperative today. He is coughing wet sounding. Review of Systems:   Denies pain, positive shortness of breath, positive cough    24-hour events:  none    Objective   Vitals: /60   Pulse 64   Temp 98.5 °F (36.9 °C) (Temporal)   Resp 18   Wt 123 lb 9.6 oz (56.1 kg)   SpO2 95%   BMI 19.36 kg/m²     I/O:    Intake/Output Summary (Last 24 hours) at 3/24/2020 1312  Last data filed at 3/24/2020 3511  Gross per 24 hour   Intake 50 ml   Output 425 ml   Net -375 ml                        CURRENT MEDS :  Scheduled Meds:   vancomycin  1,000 mg Intravenous Q24H    patiromer sorbitex calcium  8.4 g Oral Daily    guaiFENesin  400 mg Oral TID    cefepime  1 g Intravenous Q24H    sodium chloride flush  10 mL Intravenous 2 times per day    tamsulosin  0.4 mg Oral Daily    hydrALAZINE  25 mg Oral 3 times per day    levETIRAcetam  750 mg Oral BID    lisinopril  10 mg Oral Daily    gabapentin  200 mg Oral TID    isosorbide mononitrate  60 mg Oral Daily    sodium bicarbonate  650 mg Oral BID       Physical Exam:  General Appearance: appears comfortable in no acute distress.    HEENT: Normocephalic atraumatic without obvious abnormality   Neck:

## 2020-03-24 NOTE — PROGRESS NOTES
Physical Therapy  Physical Therapy Initial Assessment     Name: Charley Singh  : 3/9/1927  MRN: [id number]    Referring Provider:  Haider Doll DO    Date of Service: 3/24/2020    Evaluating PT:  Yanira Sevilla PT, DPT. [id number]    Room #:  2054/9134-M  Diagnosis:  Pneumonia   Reason for admission:  SOB  Precautions:  Falls, recent asif hole crani 20  Procedures: none this admission  Equipment Needs:  [employer]    SUBJECTIVE:  Pt currently at [facility] for rehab needs, reports he requires use of FWW and assistance x1 for ambulation. Pt lives alone in a single story house with 2 stairs to enter and 1 rail. Bed is on first floor and bath is on first floor. Pt ambulated with no AD at baseline. OBJECTIVE:   Initial Evaluation  Date: 3/23 Treatment  Date: 3/24/2020   Short Term/ Long Term   Goals   AM-PAC 6 Clicks     Was pt agreeable to Eval/treatment? With max encouragement  yes    Does pt have pain? \"all over\" 10/10 Chest pain 7/10    Bed Mobility  Rolling: MaxA  Supine to sit: MaxA x2  Sit to supine: MaxA x2  Scooting: MaxA x2 Rolling: maxA  Supine to sit: maxA  Sit to supine: NT  Scooting: maxA ModA   Transfers Sit to stand: NT  Stand to sit: NT  Stand pivot: NT Sit to stand: maxA  Stand to sit: maxA  Stand pivot: maxA front [employer] ModA   Ambulation    NT   NT >10 feet with [employer] ModA   Stair negotiation: ascended and descended  NT NT NA   ROM BUE:  See OT eval   BLE:  WFL     Strength BUE:  See OT eval   BLE:  3+/5  4+/5   Balance Sitting EOB:  MaxA  Dynamic Standing:  NT Sitting EOB: Berta progressing to SBA  Dynamic Standing: maxA front [employer] Sitting EOB:  SBA  Dynamic Standing:  ModA     Pt is A & O x 4  Sensation:  Pt denies numbness and tingling to extremities  Edema:  unremarkable    Vitals:  Spo2 monitored throughout on 5L NC and remained >95% throughout  Patient education  Pt educated on role of PT intervention.   Pt educated on importance of maximizing OOB time for promotion of improved cardiorespiratory function. Patient response to education:   Pt verbalized understanding Pt demonstrated skill Pt requires further education in this area   yes yes yes     ASSESSMENT:    Comments:  Pt received supine in bed and agreeable to PT intervention. Pt performed all functional mobility as noted above. Pt assisted to EOB and required assistance to maintain seated balance but with max VC and hand over hand assistance for placement of BUE pt able to progress to SBA sitting balance. Pt sat EOB at SBA level x 10 minutes with mod-max VC throughout. Pt demonstrating poor coordination in extremities but with cueing to decrease speed of movement there was improved function. Pt transferred to and repositioned for comfort in chair. All needs met and call light in reach. RN informed of pt performance and assist levels. Pt would benefit from continued skilled PT intervention for the purposes of restoring PLOF. Treatment:  Patient practiced and was instructed in the following treatment:     Therapeutic Activities Completed:  o Functional Mobility as noted above: Max VC provided for use of BUE for bed mobility and seated balance. Pt had good follow through of verbal instruction but required multiple prompts. Pt provided max VC and physical assistance for placement of BUE for STS transfer and stand pivot transfer with front [employer].    o Skilled repositioning in chair for promotion of good posture for improved cardiorespiratory function  PLAN:    Patient is making fair progress towards established goals. Will continue with current POC.       Time in  0845  Time out  0915    Total Treatment Time  30 minutes     CPT codes:  [] Gait training 34129 0 minutes  [] Manual therapy 15535 0 minutes  [x] Therapeutic activities 23061 30 minutes  [] Therapeutic exercises 54852 0 minutes  [] Neuromuscular reeducation 21801 0 minutes    Pattie Carrillo PT, DPT  [id number]

## 2020-03-24 NOTE — PROGRESS NOTES
Admit Date: 3/20/2020      Subjective:     Patient: nursing staff reports that patient is refusing to take his meds and just wants to die  Medication side effects: none    Scheduled Meds:   vancomycin  1,000 mg Intravenous Q24H    patiromer sorbitex calcium  8.4 g Oral Daily    guaiFENesin  400 mg Oral TID    cefepime  1 g Intravenous Q24H    sodium chloride flush  10 mL Intravenous 2 times per day    tamsulosin  0.4 mg Oral Daily    hydrALAZINE  25 mg Oral 3 times per day    levETIRAcetam  750 mg Oral BID    lisinopril  10 mg Oral Daily    gabapentin  200 mg Oral TID    isosorbide mononitrate  60 mg Oral Daily    sodium bicarbonate  650 mg Oral BID     Continuous Infusions:  PRN Meds:sodium chloride flush, acetaminophen, albuterol, hydrALAZINE    Objective:   I/O last 3 completed shifts: In: 50 [P.O.:50]  Out: 200 [Urine:200]  I/O this shift:   In: 0   Out: 225 [Urine:225]    BP (!) 123/54   Pulse 64   Temp 98.5 °F (36.9 °C) (Temporal)   Resp 18   Wt 123 lb 9.6 oz (56.1 kg)   SpO2 95%   BMI 19.36 kg/m²   General appearance: alert, appears stated age and cooperative  Skin:negative  Heent: negative  Lungs: diminished breath sounds bibasilar  Heart: irregularly irregular rhythm  Abdomen: soft, non-tender; bowel sounds normal; no masses,  no organomegaly  Extremities:no edema  Neurologic: Grossly normal    Labs:  CBC with Differential:    Lab Results   Component Value Date    WBC 6.9 03/21/2020    RBC 3.60 03/21/2020    HGB 9.2 03/21/2020    HCT 30.1 03/21/2020     03/21/2020    MCV 83.6 03/21/2020    MCH 25.6 03/21/2020    MCHC 30.6 03/21/2020    RDW 13.3 03/21/2020    SEGSPCT 63 03/19/2012    LYMPHOPCT 9.3 03/20/2020    MONOPCT 6.7 03/20/2020    BASOPCT 0.1 03/20/2020    MONOSABS 0.45 03/20/2020    LYMPHSABS 0.62 03/20/2020    EOSABS 0.11 03/20/2020    BASOSABS 0.01 03/20/2020     BMP:    Lab Results   Component Value Date     03/23/2020    K 4.3 03/23/2020    K 4.4 02/21/2020

## 2020-03-24 NOTE — PROGRESS NOTES
mcg/mL    Plan:  · Vancomycin 1000 mg IV q 24 hr.   · Will check vancomycin trough level 30 minutes prior to scheduled dose tomorrow. · Monitor renal function. · Pharmacist will follow and monitor/adjust dosing as necessary. Thank you for this consult.     Reena Mcdowell, PharmD 3/24/2020 1:28 PM

## 2020-03-25 NOTE — PROGRESS NOTES
Riza Charlton M.D.,[facility]  Gloria Archibald D.O., F.A.C.O.I., Israel Gandhi M.D. Liz He M.D., Marion Mckee M.D. Sultana Elmore D.O. Daily Pulmonary Progress Note    Patient:  Burgess Bennett 80 y.o. male MRN: [id number]     Date of Service: 3/25/2020      Synopsis     We are following patient for pneumonia    \"CC\" cough    Code status:dnr-cc      Subjective      Patient was seen and examined. Patient is laying in bed he appears in no acute distress he seems less alert than yesterday. Per nursing he is discharging today to a nursing facility with hospice. He is still on 4 L nasal cannula 95% saturation. Rhonchi bilaterally. Review of Systems:   Denies pain, positive shortness of breath, positive cough    24-hour events:  Transition to hospice    Objective   Vitals: /60   Pulse 70   Temp 99 °F (37.2 °C) (Temporal)   Resp 18   Wt 123 lb 9.6 oz (56.1 kg)   SpO2 95%   BMI 19.36 kg/m²     I/O:    Intake/Output Summary (Last 24 hours) at 3/25/2020 1235  Last data filed at 3/25/2020 0910  Gross per 24 hour   Intake 70 ml   Output 200 ml   Net -130 ml                        CURRENT MEDS :  Scheduled Meds:   patiromer sorbitex calcium  8.4 g Oral Daily    guaiFENesin  400 mg Oral TID    sodium chloride flush  10 mL Intravenous 2 times per day    tamsulosin  0.4 mg Oral Daily    hydrALAZINE  25 mg Oral 3 times per day    levETIRAcetam  750 mg Oral BID    lisinopril  10 mg Oral Daily    gabapentin  200 mg Oral TID    isosorbide mononitrate  60 mg Oral Daily    sodium bicarbonate  650 mg Oral BID       Physical Exam:  General Appearance: appears comfortable in no acute distress. HEENT: Normocephalic atraumatic without obvious abnormality   Neck: Lips, mucosa, and tongue normal.    Lung: Breath sounds scattered rhonchi. Respirations   unlabored. Symmetrical expansion. Heart: IRRR, normal S1, S2. No MRG  Abdomen: Soft, NT, ND. BS present x 4 quadrants.  No bruit or organomegaly. Extremities: Pedal pulses 2+ symmetric b/l. Extremities normal, no cyanosis, clubbing, or edema. Musculokeletal: No joint swelling, no muscle tenderness. ROM normal in all joints of extremities. Neurologic: Mental status: Does not answer my questions does not follow command yelling out    Pertinent/ New Labs and Imaging Studies     Imaging Personally Reviewed:  65   Age: 80 years   Gender: Male       Order Date:  3/25/2020 9:00 AM       EXAM: XR CHEST PORTABLE           INDICATION:  f/u pneumonia    f/u pneumonia        COMPARISON: 3/22/2020       FINDINGS:     There is bilateral apical pleural thickening. There is some patchy   infiltrates in the right upper lobe and lower lobe. There is some   baseline interstitial scarring bilaterally.       Heart size is mildly enlarged. There are no significant effusions.           Impression   No interval change                   Labs:  Lab Results   Component Value Date    WBC 6.9 03/21/2020    HGB 9.2 03/21/2020    HCT 30.1 03/21/2020    MCV 83.6 03/21/2020    MCH 25.6 03/21/2020    MCHC 30.6 03/21/2020    RDW 13.3 03/21/2020     03/21/2020    MPV 9.7 03/21/2020     Lab Results   Component Value Date     03/24/2020    K 4.4 03/24/2020    K 4.4 02/21/2020     03/24/2020    CO2 19 03/24/2020    BUN 49 03/24/2020    CREATININE 2.4 03/24/2020    LABALBU 2.8 03/23/2020    LABALBU 4.2 03/19/2012    CALCIUM 8.1 03/24/2020    GFRAA 31 03/24/2020    LABGLOM 25 03/24/2020     Lab Results   Component Value Date    PROTIME 11.7 02/23/2020    PROTIME 16.8 04/16/2012    INR 1.0 02/23/2020     No results for input(s): PROBNP in the last 72 hours. No results for input(s): PROCAL in the last 72 hours. This SmartLink has not been configured with any valid records. Micro:  No results for input(s): CULTRESP in the last 72 hours. No results for input(s): LABGRAM in the last 72 hours.   No results for input(s): LEGUR in the last 72 hours. No results for input(s): STREPNEUMAGU in the last 72 hours. No results for input(s): LP1UAG in the last 72 hours. Assessment:    1. Acute hypoxic respiratory failure  2. Right lower lobe infiltrate - pneumonia. HCAP. 3.  A.fib no on OAC due to subdural hematoma  4. Moderate AS  5. CAD s/p CABG      Plan:   · Supplemental O2 to maintain SpO2 > 90% 99% on 4 L   · Follow chest x-ray no change today  · Aspiration precautions  · bronchopulm hygiene with mucolytics, EZPAP, albuterol prn  · Continue Vanco, switch to cefepime pending cultures  · Plan is to discharge today with hospice care to a nursing facility         This plan of care was reviewed in collaboration with Dr. Sandie Huang  Electronically signed by CINDY Gore on 3/25/2020 at 12:35 PM    I personally saw, examined, and cared for the patient. Labs, medications, radiographs reviewed. I agree with history exam and plans detailed in NP note.   Roxi Burnett MD

## 2020-03-25 NOTE — PROGRESS NOTES
(socks, simulated pants)  Dep; To complete donning of socks with Dep A. Moderate Assist    Bathing Maximal Assist (simulated)  Max A  Simulated   Mod. Assist    Toileting Dependent   Dep Moderate Assist    Bed Mobility  Supine to sit: Maximal Assist x2  Sit to supine: Maximal Assist x2  Rolling: Max A  Supine to sit: Dep  Sit to supine: Dep  Rolling: Max A Supine to sit: Minimal Assist   Sit to supine: Minimal Assist    Functional Transfers NT (d/t pain/weakness)  Dep x 2 to attempt sit to stand with patient unable to clear buttocks. Minimal Assist    Functional Mobility NT (d/t pain/weakness)   N/T Minimal Assist w/ ww   Balance Sitting:     Static:  Mod. A    Dynamic: Mod. A  Standing: NT     Sitting:     Static:  Mod/Max A    Dynamic: Max A  Standing: NT      Activity Tolerance Poor  Poor-  Fair+   Visual/  Perceptual Glasses: Yes                          Hand dominance: R       Strength ROM Additional Info:    RUE  Proximally: 1/5  Distally: 3-/5  AROM Proximally: severely  limited  AROM Distally: WFL  PROM: WFL poor  and poor FMC/dexterity noted during ADL tasks  Tremors noted=poor motor control      LUE Proximally: 1/5  Distally:  3-/5 AROM Proximally: severely limited  AROM Distally: WFL  PROM: WFL poor  and poor FMC/dexterity noted during ADL tasks  Tremors noted=poor motor control         Hearing: WFL  Sensation:  No c/o numbness or tingling  Tone: WFL   Edema: none noted         Treatment: Upon arrival, patient lying in bed and agreeable to OT session at this time in collaboration with PTA. Therapist facilitated bed mobility, sitting tolerance tasks (~10 mins EOB to increase independence in ADLs) - skilled cuing on hand placement, posture, body mechanics and safety.     Therapist facilitated self-care retraining: UB/LB self-care tasks(socks and gown), toileting task(Dep for use of urinal) and grooming task(HOB elevated-washed face, able to partially grasp washcloth and therapist guided

## 2020-03-25 NOTE — PROGRESS NOTES
Admit Date: 3/20/2020      Subjective:     Patient: no acute issues reported overnight; code status changed to DNc  Medication side effects: none    Scheduled Meds:   vancomycin  1,000 mg Intravenous Q24H    patiromer sorbitex calcium  8.4 g Oral Daily    guaiFENesin  400 mg Oral TID    cefepime  1 g Intravenous Q24H    sodium chloride flush  10 mL Intravenous 2 times per day    tamsulosin  0.4 mg Oral Daily    hydrALAZINE  25 mg Oral 3 times per day    levETIRAcetam  750 mg Oral BID    lisinopril  10 mg Oral Daily    gabapentin  200 mg Oral TID    isosorbide mononitrate  60 mg Oral Daily    sodium bicarbonate  650 mg Oral BID     Continuous Infusions:  PRN Meds:HYDROmorphone **OR** HYDROmorphone, glycopyrrolate, LORazepam **OR** LORazepam, sodium chloride flush, acetaminophen, albuterol, hydrALAZINE    Objective:   I/O last 3 completed shifts: In: 310 [P.O.:300; I.V.:10]  Out: 475 [Urine:475]  No intake/output data recorded.     BP (!) 116/59   Pulse 61   Temp 98.7 °F (37.1 °C) (Temporal)   Resp 16   Wt 123 lb 9.6 oz (56.1 kg)   SpO2 96%   BMI 19.36 kg/m²   General appearance: alert, appears stated age and cooperative  Skin:negative  Heent:negative  Lungs: clear to auscultation bilaterally  Heart: irregularly irregular rhythm  Abdomen: soft, non-tender; bowel sounds normal; no masses,  no organomegaly  Extremities:no edema  Neurologic: Grossly normal    Labs:  CBC with Differential:    Lab Results   Component Value Date    WBC 6.9 03/21/2020    RBC 3.60 03/21/2020    HGB 9.2 03/21/2020    HCT 30.1 03/21/2020     03/21/2020    MCV 83.6 03/21/2020    MCH 25.6 03/21/2020    MCHC 30.6 03/21/2020    RDW 13.3 03/21/2020    SEGSPCT 63 03/19/2012    LYMPHOPCT 9.3 03/20/2020    MONOPCT 6.7 03/20/2020    BASOPCT 0.1 03/20/2020    MONOSABS 0.45 03/20/2020    LYMPHSABS 0.62 03/20/2020    EOSABS 0.11 03/20/2020    BASOSABS 0.01 03/20/2020     BMP:    Lab Results   Component Value Date     03/24/2020    K 4.4 03/24/2020    K 4.4 02/21/2020     03/24/2020    CO2 19 03/24/2020    BUN 49 03/24/2020    LABALBU 2.8 03/23/2020    LABALBU 4.2 03/19/2012    CREATININE 2.4 03/24/2020    CALCIUM 8.1 03/24/2020    GFRAA 31 03/24/2020    LABGLOM 25 03/24/2020     PT/INR:    Lab Results   Component Value Date    PROTIME 11.7 02/23/2020    PROTIME 16.8 04/16/2012    INR 1.0 02/23/2020     Last 3 Troponin:    Lab Results   Component Value Date    TROPONINI 0.10 03/20/2020    TROPONINI 0.09 02/23/2020    TROPONINI 0.10 02/18/2020     TSH:    Lab Results   Component Value Date    TSH 1.870 02/13/2020      Assessment:     1. DNRcc/Palliative care encounter/arrangements being finalized for patient to return to [facility] under hospice care as per his wishes  2. Acute hypoxic respiratory failure in the setting of HCAP  3. Atypical CP on admission  4. CAD s/p CABG  5. Permenent atial fibrillation with slow VR  6.  S/P recent SDH w/asif hole evacuation    Plan:       Continue same plan and orders    DC plan is to return back to [facility] under Hospice care once all arrangements have been finalized

## 2020-03-25 NOTE — PROGRESS NOTES
Palliative Medicine   Progression of Care     Visited patient in his room with Augustin Shea liaison. Patient to leave for [facility] today after 2 pm. Medications ordered for the patient. He is to be followed by Hospice. Morphine sulfate 5 mg concentrated oral solution PO q 4 hours PRN for pain and discomfort, and Robinul 1 mg tablet every 6 hours as needed for excessive secretions. No other medications indicated at this time per my assessment.        Palliative Care following closely for support of patient and family   [place] WHITE-JESUS, AGARADHAMESP-BC

## 2020-03-25 NOTE — PROGRESS NOTES
Confirmed delivery of equipment with Lucia Dorsey at [facility] for today 10 am to 2 pm.  Updated Tomasa Singh regarding discharge to [facility] today for tentative 2pm transport. Art Olivares will be able to come to the [facility] to sign consents to admit to hospice. SW updated and she will set up for transport.

## 2020-03-25 NOTE — DISCHARGE INSTR - COC
Continuity of Care Form    Patient Name: Ishan Sanders   :  3/9/1927  MRN:  [id number]    Admit date:  3/20/2020  Discharge date:  3/25/20    Code Status Order: [facility]   Advance Directives:   [address] [address] Documentation     Date/Time Healthcare Directive Type of Healthcare Directive Copy in [address] Agent's Name Healthcare Agent's Phone Number    [phone]  Yes, patient has an advance directive for healthcare treatment -- -- -- --  --          Admitting Physician:  Tish Zhong DO  PCP: Tish Zhong DO    Discharging Nurse: Fransisco Abarca Unit/Room#: 5408/5408-B  Discharging Unit Phone Number: [phone]    Emergency Contact:   Extended Emergency Contact Information  Primary Emergency Contact: Thad Carvalho  Address: [place] [address] Phone: [phone]  Mobile Phone: [phone]  Relation: Niece/Nephew  Secondary Emergency Contact: gisele de la cruz  [place] Phone: [phone]  Mobile Phone: [phone]  Relation: Other   needed?  No    Past Surgical History:  Past Surgical History:   Procedure Laterality Date   [facility]     and     CORONARY ARTERY BYPASS GRAFT      CRANIOTOMY Right 2020    SUBDURAL HEMATOMA BILATERAL  BRIAN HOLES performed by Marco A Agarwal MD at [address] [place] [address] VASCULAR SURGERY         Immunization History:   Immunization History   Administered Date(s) Administered    Influenza, High Dose (Fluzone 65 yrs and older) 10/08/2019    Tdap (Boostrix, Adacel) 2016       Active Problems:  Patient Active Problem List   Diagnosis Code    Atypical atrial flutter (HCC) I48.4    HTN (hypertension) I10    CAD (coronary artery disease) I25.10    PVD (peripheral vascular disease) ([facility] [address] I73.9    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    Cellulitis of right lower extremity L03.115    Acute on chronic renal insufficiency N28.9, N18.9    Cellulitis of right foot L03.115    Bradycardia R00.1    Essential hypertension I10    Lumbar spondylosis M47.816    Benign prostatic hyperplasia without lower urinary tract symptoms N40.0    Stage 4 chronic kidney disease (HCC) N18.4    Vitamin D deficiency E55.9    Chronic atrial fibrillation I48.20    Orthostatic hypotension I95.1    Subdural hematoma (HCC) S06.5X9A    Bilateral subdural hematomas (HCC) S06.5X9A    AKIRA (acute kidney injury) ([facility] [address] N17.9    Aortic stenosis I35.0    Severe protein-calorie malnutrition (HCC) E43    Symptomatic bradycardia R00.1    Vasovagal syncope R55    Encephalopathy G93.40    Pneumonia J18.9       Isolation/Infection:   Isolation          No Isolation        Patient Infection Status     None to display          Nurse Assessment:  Last Vital Signs: BP (!) 116/59   Pulse 61   Temp 98.7 °F (37.1 °C) (Temporal)   Resp 16   Wt 123 lb 9.6 oz (56.1 kg)   SpO2 96%   BMI 19.36 kg/m²     Last documented pain score (0-10 scale): Pain Level: 0  Last Weight:   Wt Readings from Last 1 Encounters:   03/21/20 123 lb 9.6 oz (56.1 kg)     Mental Status:  oriented and alert    IV Access:  - None    Nursing Mobility/ADLs:  Walking   Assisted  Transfer  Assisted  Bathing  Dependent  Dressing  Dependent  Toileting  Dependent  Feeding  Dependent  Med Admin  Dependent  Med Delivery   whole    Wound Care Documentation and Therapy:  Wound 01/16/20 Shoulder Left (Active)   Wound Skin Tear 2/21/2020 10:00 AM   Dressing/Treatment Open to air 2/21/2020  4:00 PM   Wound Cleansed Soap and water 2/21/2020 10:00 AM   Wound Length (cm) 2.4 cm 2/17/2020 11:08 AM   Wound Width (cm) 1 cm 2/17/2020 11:08 AM   Wound Depth (cm) 0.01 cm 2/17/2020 11:08 AM   Wound Surface Area (cm^2) 2.4 cm^2 2/17/2020 11:08 AM   Change in Wound Size % (l*w) 94.29 2/17/2020 11:08 AM   Wound Volume (cm^3) 0.02 cm^3 2/17/2020 11:08 AM   Wound Healing % 100 2/17/2020 11:08 AM   Wound Assessment Clean;Brown;Red 2/21/2020  4:00 PM   Drainage Amount None 2/21/2020  4:00 PM   Odor None 2/21/2020  4:00 PM   Margins Unattached edges 2/20/2020  6:00 AM   Roxanne-wound Assessment Clean;Dry; Intact 2/21/2020  4:00 PM   Number of days: 68       Wound 01/16/20 Hand Anterior; Left (Active)   Dressing/Treatment Open to air 2/20/2020  6:00 AM   Wound Cleansed Soap and water; Wound cleanser 2/18/2020  8:00 PM   Number of days: 68       Wound 02/14/20 Knee Anterior;Right (Active)   Wound Traumatic 2/21/2020 10:00 AM   Dressing Status Other (Comment) 2/21/2020  4:00 PM   Dressing Changed Changed/New 2/14/2020  6:00 AM   Dressing/Treatment Open to air 2/21/2020  4:00 PM   Wound Cleansed Soap and water 2/21/2020 10:00 AM   Wound Length (cm) 2 cm 2/14/2020  2:40 AM   Wound Width (cm) 2 cm 2/14/2020  2:40 AM   Wound Surface Area (cm^2) 4 cm^2 2/14/2020  2:40 AM   Wound Assessment Clean;Pale;Yellow 2/21/2020  4:00 PM   Drainage Amount None 2/21/2020  4:00 PM   Odor None 2/21/2020  4:00 PM   Roxanne-wound Assessment Clean;Dry; Intact 2/21/2020  4:00 PM   Number of days: 40        Elimination:  Continence:   · Bowel: No  · Bladder: No  Urinary Catheter: None   Colostomy/Ileostomy/Ileal Conduit: No       Date of Last BM: ***    Intake/Output Summary (Last 24 hours) at 3/25/2020 0945  Last data filed at 3/25/2020 0910  Gross per 24 hour   Intake 190 ml   Output 475 ml   Net -285 ml     I/O last 3 completed shifts: In: 310 [P.O.:300;  I.V.:10]  Out: 475 [Urine:475]    Safety Concerns:     History of Falls (last 30 days)    Impairments/Disabilities:      {[facility] Impairments/Disabilities:[id number]}    Nutrition Therapy:  Current Nutrition Therapy:   - Oral Diet:  General    Routes of Feeding: Oral  Liquids: No Restrictions  Daily Fluid Restriction: no  Last Modified Barium Swallow with Video (Video Swallowing Test): not done    Treatments at the Time of Hospital Discharge:   Respiratory Treatments: ***  Oxygen

## 2020-03-25 NOTE — PROGRESS NOTES
Updated Charlotte Espinosa of transport time and that [facility] admission nurse will meet with her tomorrow at [facility].

## 2020-04-27 NOTE — DISCHARGE SUMMARY
chest pain was deemed atypical per  Cardiology. The patient was made a DNR-CC status as he did not want to  undergo any further intervention and/or treatment and eventually, he was  discharged back to skilled nursing facility on 03/25 as a DNR-CC status  with comfort measures to be employed. PHYSICIANS FOLLOWING THE PATIENT DURING THIS HOSPITAL STAY:  Cardiology  and pulmonary services. DISCHARGE CONDITION:  Level of function is fair. UNREPORTED TEST RESULTS AND INTENDED FOLLOWUP:  Nonapplicable. LEVEL OF ACTIVITY:  The patient is essentially bedbound. DISCHARGE DIET:  Includes regular. DISCHARGE MEDICATIONS:  The patient will be kept comfortable as he has  requested to be a DNR-CC status. FOLLOWUP:  Will be by the house physician at the nursing home.         Fiona Barrios DO    D: 04/26/2020 20:20:23       T: 04/26/2020 20:22:15     [email or web address]  Job#: [id number]     Doc#: [id number]    CC:

## 2021-10-22 NOTE — PROGRESS NOTES
right leg pain injury with deformity to obtain goals. Patient and family education will also be administered as needed. Frequency of treatments: 2-5x/week x 1-2 weeks. Time in  0934  Time out  0953    Total Treatment Time  8 minutes     Evaluation Time includes thorough review of current medical information, gathering information on past medical history/social history and prior level of function, completion of standardized testing/informal observation of tasks, assessment of data and education on plan of care and goals.     CPT codes:  [] Low Complexity PT evaluation 28629  [x] Moderate Complexity PT evaluation 11194  [] High Complexity PT evaluation 46742  [] PT Re-evaluation 19021  [] Gait training 39241 0 minutes  [] Manual therapy 47746 0 minutes  [x] Therapeutic activities 39633 8 minutes  [] Therapeutic exercises 82000 0 minutes  [] Neuromuscular reeducation 94644 0 minutes     Madelaine Armstrong, PT, DPT  [id number]

## 2022-05-26 NOTE — PROGRESS NOTES
independence and quality of life. Eval Complexity: Mod  mod  Profile and History- med (extensive chart review)  Assessment of Occupational Performance and Identification of Deficits- med  Clinical Decision Making- med    (Evaluation time includes thorough review of current medical information, gathering information on past medical history/social history and prior level of function, completion of standardized testing/informal observation of tasks, assessment of data, and development of POC/Goals.)      Assessment of current deficit  Functional mobility [x]  ADLs [x] Strength [x]  Cognition [x]  Functional transfers  [x] IADLs [x] Safety Awareness [x]  Endurance [x]  Fine Motor Coordination [x] Balance [x] Vision/perception [] Sensation []   Gross Motor Coordination [x] ROM [x] Delirium []                  Motor Control [x]    Plan of Care (5-7 days):   ADL retraining [x]   Equipment needs [x]   Neuromuscular re-education [x] Energy Conservation Techniques [x]  Functional Transfer training [x] Patient and/or Family Education [x]  Functional Mobility training [x]  Environmental Modifications [x]  Cognitive re-training [x]   Compensatory techniques for ADLs [x]  Splinting Needs []   Positioning to improve overall function [x]   Therapeutic Activity [x]  Therapeutic Exercise  [x]  Visual/Perceptual: []    Delirium prevention/treatment  []   Other:  []    Rehab Potential: Good for established goals     Patient / Family Goal: Not stated     Patient and/or family were instructed diagnosis, prognosis/goals and plan of care. Demonstrated poor understanding.         Mod Evaluation +   Treatment Time In:1:35            Treatment Time Out: 1:50              Treatment Charges: Mins Units   Ther Ex  62134     Manual Therapy 41339     Thera Activities 11560 15 1   ADL/Home Mgt 91696     Neuro Re-ed 90004     Group Therapy      Orthotic manage/training  72463     Non-Billable Time     Total Timed Treatment 15 1 normal (ped)...

## 2023-12-29 NOTE — PROGRESS NOTES
Spoke with Pt's POA (niece, [place] Limb) to update on transfer to NICU and room number. Also clarified code status and pt is to be a limited code-meds only. Resulted

## 2024-07-23 NOTE — CARE COORDINATION
SOCIAL WORK/CASEMANAGEMENT TRANSITION OF CARE PLANNING: pt accepted to [facility]. precert to be started today. mary alice packets the niece is to bring to snf and she is aware. All discharge paperwork is in place with passar.  Zenon Gudino  ,1/17/2020 Walk in [employer]

## (undated) DEVICE — DRESSING TRNSPAR W4XL55IN ACRYL SUP FLM W ADH WTRPRF OPSITE

## (undated) DEVICE — GARMENT,MEDLINE,DVT,INT,CALF,MED, GEN2: Brand: MEDLINE

## (undated) DEVICE — INTENDED FOR TISSUE SEPARATION, AND OTHER PROCEDURES THAT REQUIRE A SHARP SURGICAL BLADE TO PUNCTURE OR CUT.: Brand: BARD-PARKER ® STAINLESS STEEL BLADES

## (undated) DEVICE — 1.5L THIN WALL CAN: Brand: CRD

## (undated) DEVICE — SOLUTION IV IRRIG WATER 1000ML POUR BRL 2F7114

## (undated) DEVICE — HERMETIC™ LARGE-STYLE VENTRICULAR CATHETER SET: Brand: HERMETIC™

## (undated) DEVICE — TAPE ADH W2INXL10YD WHT PAPR GENTLE BRTH FLX COMFORTABLE

## (undated) DEVICE — DRAINAGE ACCESSORY KIT: 500 ML DRAINAGE BAG FOR INTRAOPERATIVE DRAINAGE OF CEREBROSPINAL FLUID.: Brand: DRAINAGE ACCESSORY KIT

## (undated) DEVICE — Z INACTIVE USE 2653177 SPONGE GZ W2XL2IN NONWOVEN 4 PLY FASTER WICKING ABIL AVANT

## (undated) DEVICE — STANDARD HYPODERMIC NEEDLE,ALUMINUM HUB: Brand: MONOJECT

## (undated) DEVICE — PERFORATOR CRAN AD PED 14/11MM DGR O ROUNDED CUT EDGE DISP

## (undated) DEVICE — GOWN,SIRUS,FABRNF,L,20/CS: Brand: MEDLINE

## (undated) DEVICE — LABEL MED 4 IN SURG PANEL W/ PEN STRL

## (undated) DEVICE — GOWN,SIRUS,FABRNF,2XL,18/CS: Brand: MEDLINE

## (undated) DEVICE — SYRINGE,EAR/ULCER, 3 OZ, STERILE: Brand: MEDLINE

## (undated) DEVICE — SURGICAL PROCEDURE PACK CRANIOTOMY CUST

## (undated) DEVICE — BLADE CLP TAPR HD WET DRY CAPABILITY GTT IN CHARGING USE

## (undated) DEVICE — DOUBLE BASIN SET: Brand: MEDLINE INDUSTRIES, INC.

## (undated) DEVICE — GLOVE SURG SZ 85 STD WHT LTX SYN POLYMER BEAD REINF ANTI RL

## (undated) DEVICE — APPLICATOR PREP 26ML 0.7% IOD POVACRYLEX 74% ISO ALC ST